# Patient Record
Sex: MALE | Race: WHITE | Employment: OTHER | URBAN - METROPOLITAN AREA
[De-identification: names, ages, dates, MRNs, and addresses within clinical notes are randomized per-mention and may not be internally consistent; named-entity substitution may affect disease eponyms.]

---

## 2021-05-07 LAB — PROSTATE SPECIFIC ANTIGEN: 0.85 NG/ML

## 2021-05-18 PROBLEM — R07.9 CHEST PAIN: Status: ACTIVE | Noted: 2021-05-18

## 2021-05-18 PROBLEM — R06.09 DOE (DYSPNEA ON EXERTION): Status: ACTIVE | Noted: 2021-05-18

## 2021-05-18 PROBLEM — I48.19 PERSISTENT ATRIAL FIBRILLATION (HCC): Status: ACTIVE | Noted: 2021-05-18

## 2021-05-18 PROBLEM — I48.0 PAROXYSMAL ATRIAL FIBRILLATION (HCC): Status: ACTIVE | Noted: 2021-05-18

## 2021-05-18 PROBLEM — E78.5 HYPERLIPIDEMIA: Status: ACTIVE | Noted: 2021-05-18

## 2021-06-22 PROBLEM — I42.9 CARDIOMYOPATHY (HCC): Status: ACTIVE | Noted: 2021-06-22

## 2021-09-20 LAB — PROSTATE SPECIFIC ANTIGEN: 0.68 NG/ML

## 2021-09-23 ENCOUNTER — HOSPITAL ENCOUNTER (OUTPATIENT)
Dept: LAB | Age: 66
Discharge: HOME | End: 2021-09-23
Payer: OTHER GOVERNMENT

## 2021-09-23 DIAGNOSIS — I48.19 PERSISTENT ATRIAL FIBRILLATION (HCC): ICD-10-CM

## 2021-09-23 DIAGNOSIS — I42.0 DILATED CARDIOMYOPATHY (HCC): ICD-10-CM

## 2021-09-23 LAB
ANION GAP SERPL CALC-SCNC: 4 MMOL/L (ref 7–16)
BASOPHILS # BLD: 0 K/UL (ref 0–0.2)
BASOPHILS NFR BLD: 1 % (ref 0–2)
BUN SERPL-MCNC: 18 MG/DL (ref 8–23)
CALCIUM SERPL-MCNC: 9.7 MG/DL (ref 8.3–10.4)
CHLORIDE SERPL-SCNC: 107 MMOL/L (ref 98–107)
CO2 SERPL-SCNC: 30 MMOL/L (ref 21–32)
CREAT SERPL-MCNC: 1.05 MG/DL (ref 0.8–1.5)
DIFFERENTIAL METHOD BLD: NORMAL
EOSINOPHIL # BLD: 0.3 K/UL (ref 0–0.8)
EOSINOPHIL NFR BLD: 5 % (ref 0.5–7.8)
ERYTHROCYTE [DISTWIDTH] IN BLOOD BY AUTOMATED COUNT: 12.4 % (ref 11.9–14.6)
GLUCOSE SERPL-MCNC: 80 MG/DL (ref 65–100)
HCT VFR BLD AUTO: 44.9 % (ref 41.1–50.3)
HGB BLD-MCNC: 14.5 G/DL (ref 13.6–17.2)
IMM GRANULOCYTES # BLD AUTO: 0 K/UL (ref 0–0.5)
IMM GRANULOCYTES NFR BLD AUTO: 1 % (ref 0–5)
LYMPHOCYTES # BLD: 1.9 K/UL (ref 0.5–4.6)
LYMPHOCYTES NFR BLD: 32 % (ref 13–44)
MAGNESIUM SERPL-MCNC: 2.4 MG/DL (ref 1.8–2.4)
MCH RBC QN AUTO: 31.5 PG (ref 26.1–32.9)
MCHC RBC AUTO-ENTMCNC: 32.3 G/DL (ref 31.4–35)
MCV RBC AUTO: 97.6 FL (ref 79.6–97.8)
MONOCYTES # BLD: 0.5 K/UL (ref 0.1–1.3)
MONOCYTES NFR BLD: 8 % (ref 4–12)
NEUTS SEG # BLD: 3.3 K/UL (ref 1.7–8.2)
NEUTS SEG NFR BLD: 54 % (ref 43–78)
NRBC # BLD: 0 K/UL (ref 0–0.2)
PLATELET # BLD AUTO: 352 K/UL (ref 150–450)
PMV BLD AUTO: 9.5 FL (ref 9.4–12.3)
POTASSIUM SERPL-SCNC: 5 MMOL/L (ref 3.5–5.1)
RBC # BLD AUTO: 4.6 M/UL (ref 4.23–5.6)
SODIUM SERPL-SCNC: 141 MMOL/L (ref 138–145)
WBC # BLD AUTO: 6.1 K/UL (ref 4.3–11.1)

## 2021-09-23 PROCEDURE — 36415 COLL VENOUS BLD VENIPUNCTURE: CPT

## 2021-09-23 PROCEDURE — 85025 COMPLETE CBC W/AUTO DIFF WBC: CPT

## 2021-09-23 PROCEDURE — 83735 ASSAY OF MAGNESIUM: CPT

## 2021-09-23 PROCEDURE — 80048 BASIC METABOLIC PNL TOTAL CA: CPT

## 2021-09-24 RX ORDER — ACETAMINOPHEN 500 MG
500 TABLET ORAL 2 TIMES DAILY
COMMUNITY

## 2021-09-24 NOTE — PROGRESS NOTES
Patient pre-assessment complete for KARTHIK-Atrial Fib Ablation with Dr Galina Bills scheduled for 21 at 7:30am, arrival time 6am. Patient verified using . Patient instructed to bring all home medications in labeled bottles on the day of procedure. NPO status reinforced. Patient instructed to HOLD eliquis starting . Instructed they can take all other medications excluding vitamins & supplements. Patient verbalizes understanding of all instructions & denies any questions at this time.

## 2021-09-26 ENCOUNTER — ANESTHESIA EVENT (OUTPATIENT)
Dept: CARDIAC CATH/INVASIVE PROCEDURES | Age: 66
End: 2021-09-26
Payer: OTHER GOVERNMENT

## 2021-09-27 ENCOUNTER — HOSPITAL ENCOUNTER (OUTPATIENT)
Age: 66
Setting detail: OUTPATIENT SURGERY
Discharge: HOME | End: 2021-09-27
Attending: INTERNAL MEDICINE | Admitting: INTERNAL MEDICINE
Payer: OTHER GOVERNMENT

## 2021-09-27 ENCOUNTER — HOSPITAL ENCOUNTER (OUTPATIENT)
Dept: CARDIAC CATH/INVASIVE PROCEDURES | Age: 66
Discharge: HOME | End: 2021-09-27
Attending: INTERNAL MEDICINE
Payer: OTHER GOVERNMENT

## 2021-09-27 ENCOUNTER — ANESTHESIA (OUTPATIENT)
Dept: CARDIAC CATH/INVASIVE PROCEDURES | Age: 66
End: 2021-09-27
Payer: OTHER GOVERNMENT

## 2021-09-27 VITALS
HEIGHT: 72 IN | OXYGEN SATURATION: 97 % | RESPIRATION RATE: 18 BRPM | WEIGHT: 176 LBS | BODY MASS INDEX: 23.84 KG/M2 | HEART RATE: 84 BPM | DIASTOLIC BLOOD PRESSURE: 72 MMHG | TEMPERATURE: 97.8 F | SYSTOLIC BLOOD PRESSURE: 108 MMHG

## 2021-09-27 VITALS — WEIGHT: 176 LBS | BODY MASS INDEX: 23.84 KG/M2 | HEIGHT: 72 IN

## 2021-09-27 DIAGNOSIS — I48.91 ATRIAL FIBRILLATION, UNSPECIFIED TYPE (HCC): ICD-10-CM

## 2021-09-27 DIAGNOSIS — I42.0 DILATED CARDIOMYOPATHY (HCC): ICD-10-CM

## 2021-09-27 DIAGNOSIS — I48.19 PERSISTENT ATRIAL FIBRILLATION (HCC): ICD-10-CM

## 2021-09-27 LAB
ACT BLD: 263 SECS (ref 70–128)
ACT BLD: 301 SECS (ref 70–128)
ANION GAP SERPL CALC-SCNC: 2 MMOL/L (ref 7–16)
ATRIAL RATE: 340 BPM
BUN SERPL-MCNC: 20 MG/DL (ref 8–23)
CALCIUM SERPL-MCNC: 8.9 MG/DL (ref 8.3–10.4)
CALCULATED R AXIS, ECG10: 58 DEGREES
CALCULATED T AXIS, ECG11: 54 DEGREES
CHLORIDE SERPL-SCNC: 108 MMOL/L (ref 98–107)
CO2 SERPL-SCNC: 30 MMOL/L (ref 21–32)
CREAT SERPL-MCNC: 1.05 MG/DL (ref 0.8–1.5)
DIAGNOSIS, 93000: NORMAL
ERYTHROCYTE [DISTWIDTH] IN BLOOD BY AUTOMATED COUNT: 12.4 % (ref 11.9–14.6)
GLUCOSE SERPL-MCNC: 92 MG/DL (ref 65–100)
HCT VFR BLD AUTO: 41.7 % (ref 41.1–50.3)
HGB BLD-MCNC: 13.5 G/DL (ref 13.6–17.2)
INR PPP: 1.1
MAGNESIUM SERPL-MCNC: 2.2 MG/DL (ref 1.8–2.4)
MCH RBC QN AUTO: 32.1 PG (ref 26.1–32.9)
MCHC RBC AUTO-ENTMCNC: 32.4 G/DL (ref 31.4–35)
MCV RBC AUTO: 99 FL (ref 79.6–97.8)
NRBC # BLD: 0 K/UL (ref 0–0.2)
PLATELET # BLD AUTO: 283 K/UL (ref 150–450)
PMV BLD AUTO: 9 FL (ref 9.4–12.3)
POTASSIUM SERPL-SCNC: 4.5 MMOL/L (ref 3.5–5.1)
PROTHROMBIN TIME: 14.4 SEC (ref 12.6–14.5)
Q-T INTERVAL, ECG07: 374 MS
QRS DURATION, ECG06: 112 MS
QTC CALCULATION (BEZET), ECG08: 462 MS
RBC # BLD AUTO: 4.21 M/UL (ref 4.23–5.6)
SODIUM SERPL-SCNC: 140 MMOL/L (ref 138–145)
VENTRICULAR RATE, ECG03: 92 BPM
WBC # BLD AUTO: 5.9 K/UL (ref 4.3–11.1)

## 2021-09-27 PROCEDURE — 80048 BASIC METABOLIC PNL TOTAL CA: CPT

## 2021-09-27 PROCEDURE — 93312 ECHO TRANSESOPHAGEAL: CPT

## 2021-09-27 PROCEDURE — C1894 INTRO/SHEATH, NON-LASER: HCPCS | Performed by: INTERNAL MEDICINE

## 2021-09-27 PROCEDURE — 93622 COMP EP EVAL L VENTR PAC&REC: CPT | Performed by: INTERNAL MEDICINE

## 2021-09-27 PROCEDURE — 93325 DOPPLER ECHO COLOR FLOW MAPG: CPT | Performed by: INTERNAL MEDICINE

## 2021-09-27 PROCEDURE — C1732 CATH, EP, DIAG/ABL, 3D/VECT: HCPCS | Performed by: INTERNAL MEDICINE

## 2021-09-27 PROCEDURE — C1893 INTRO/SHEATH, FIXED,NON-PEEL: HCPCS | Performed by: INTERNAL MEDICINE

## 2021-09-27 PROCEDURE — 77030027107 HC PTCH EXT REF CARTO3 J&J -F: Performed by: INTERNAL MEDICINE

## 2021-09-27 PROCEDURE — 93320 DOPPLER ECHO COMPLETE: CPT | Performed by: INTERNAL MEDICINE

## 2021-09-27 PROCEDURE — 74011000250 HC RX REV CODE- 250: Performed by: NURSE ANESTHETIST, CERTIFIED REGISTERED

## 2021-09-27 PROCEDURE — 93662 INTRACARDIAC ECG (ICE): CPT | Performed by: INTERNAL MEDICINE

## 2021-09-27 PROCEDURE — 93656 COMPRE EP EVAL ABLTJ ATR FIB: CPT | Performed by: INTERNAL MEDICINE

## 2021-09-27 PROCEDURE — 77030013687 HC GD NDL BARD -B: Performed by: INTERNAL MEDICINE

## 2021-09-27 PROCEDURE — 85027 COMPLETE CBC AUTOMATED: CPT

## 2021-09-27 PROCEDURE — 93312 ECHO TRANSESOPHAGEAL: CPT | Performed by: INTERNAL MEDICINE

## 2021-09-27 PROCEDURE — C1760 CLOSURE DEV, VASC: HCPCS | Performed by: INTERNAL MEDICINE

## 2021-09-27 PROCEDURE — 74011250636 HC RX REV CODE- 250/636: Performed by: NURSE ANESTHETIST, CERTIFIED REGISTERED

## 2021-09-27 PROCEDURE — 85610 PROTHROMBIN TIME: CPT

## 2021-09-27 PROCEDURE — 85347 COAGULATION TIME ACTIVATED: CPT

## 2021-09-27 PROCEDURE — 93623 PRGRMD STIMJ&PACG IV RX NFS: CPT | Performed by: INTERNAL MEDICINE

## 2021-09-27 PROCEDURE — C1759 CATH, INTRA ECHOCARDIOGRAPHY: HCPCS | Performed by: INTERNAL MEDICINE

## 2021-09-27 PROCEDURE — 74011250636 HC RX REV CODE- 250/636: Performed by: INTERNAL MEDICINE

## 2021-09-27 PROCEDURE — 93613 INTRACARDIAC EPHYS 3D MAPG: CPT | Performed by: INTERNAL MEDICINE

## 2021-09-27 PROCEDURE — C1730 CATH, EP, 19 OR FEW ELECT: HCPCS | Performed by: INTERNAL MEDICINE

## 2021-09-27 PROCEDURE — 93655 ICAR CATH ABLTJ DSCRT ARRHYT: CPT | Performed by: INTERNAL MEDICINE

## 2021-09-27 PROCEDURE — 92960 CARDIOVERSION ELECTRIC EXT: CPT | Performed by: INTERNAL MEDICINE

## 2021-09-27 PROCEDURE — C1733 CATH, EP, OTHR THAN COOL-TIP: HCPCS | Performed by: INTERNAL MEDICINE

## 2021-09-27 PROCEDURE — 77030035291 HC TBNG PMP SMARTABLATE J&J -B: Performed by: INTERNAL MEDICINE

## 2021-09-27 PROCEDURE — 77030039425 HC BLD LARYNG TRULITE DISP TELE -A: Performed by: ANESTHESIOLOGY

## 2021-09-27 PROCEDURE — 76060000035 HC ANESTHESIA 2 TO 2.5 HR: Performed by: INTERNAL MEDICINE

## 2021-09-27 PROCEDURE — 77030020506 HC NDL TRNSPTL NRG BAYL -F: Performed by: INTERNAL MEDICINE

## 2021-09-27 PROCEDURE — 77030019908 HC STETH ESOPH SIMS -A: Performed by: ANESTHESIOLOGY

## 2021-09-27 PROCEDURE — 77030037088 HC TUBE ENDOTRACH ORAL NSL COVD-A: Performed by: ANESTHESIOLOGY

## 2021-09-27 PROCEDURE — 77030013794 HC KT TRNSDUC BLD EDWD -B: Performed by: INTERNAL MEDICINE

## 2021-09-27 PROCEDURE — 83735 ASSAY OF MAGNESIUM: CPT

## 2021-09-27 PROCEDURE — 76210000006 HC OR PH I REC 0.5 TO 1 HR: Performed by: INTERNAL MEDICINE

## 2021-09-27 PROCEDURE — 93005 ELECTROCARDIOGRAM TRACING: CPT | Performed by: INTERNAL MEDICINE

## 2021-09-27 RX ORDER — PROTAMINE SULFATE 10 MG/ML
INJECTION, SOLUTION INTRAVENOUS AS NEEDED
Status: DISCONTINUED | OUTPATIENT
Start: 2021-09-27 | End: 2021-09-27 | Stop reason: HOSPADM

## 2021-09-27 RX ORDER — LIDOCAINE HYDROCHLORIDE 10 MG/ML
0.3 INJECTION INFILTRATION; PERINEURAL ONCE
Status: DISCONTINUED | OUTPATIENT
Start: 2021-09-27 | End: 2021-09-27 | Stop reason: HOSPADM

## 2021-09-27 RX ORDER — PANTOPRAZOLE SODIUM 40 MG/1
40 TABLET, DELAYED RELEASE ORAL EVERY 12 HOURS
Qty: 60 TABLET | Refills: 0 | Status: SHIPPED | OUTPATIENT
Start: 2021-09-27 | End: 2021-09-28 | Stop reason: SDUPTHER

## 2021-09-27 RX ORDER — MIDAZOLAM HYDROCHLORIDE 1 MG/ML
2 INJECTION, SOLUTION INTRAMUSCULAR; INTRAVENOUS
Status: DISCONTINUED | OUTPATIENT
Start: 2021-09-27 | End: 2021-09-28 | Stop reason: HOSPADM

## 2021-09-27 RX ORDER — HEPARIN SODIUM 200 [USP'U]/100ML
INJECTION, SOLUTION INTRAVENOUS AS NEEDED
Status: DISCONTINUED | OUTPATIENT
Start: 2021-09-27 | End: 2021-09-27 | Stop reason: HOSPADM

## 2021-09-27 RX ORDER — SUCRALFATE 1 G/1
1 TABLET ORAL 4 TIMES DAILY
Qty: 120 TABLET | Refills: 0 | Status: SHIPPED | OUTPATIENT
Start: 2021-09-27 | End: 2021-09-28 | Stop reason: SDUPTHER

## 2021-09-27 RX ORDER — SODIUM CHLORIDE, SODIUM LACTATE, POTASSIUM CHLORIDE, CALCIUM CHLORIDE 600; 310; 30; 20 MG/100ML; MG/100ML; MG/100ML; MG/100ML
100 INJECTION, SOLUTION INTRAVENOUS CONTINUOUS
Status: DISCONTINUED | OUTPATIENT
Start: 2021-09-27 | End: 2021-09-28 | Stop reason: HOSPADM

## 2021-09-27 RX ORDER — PROPOFOL 10 MG/ML
INJECTION, EMULSION INTRAVENOUS AS NEEDED
Status: DISCONTINUED | OUTPATIENT
Start: 2021-09-27 | End: 2021-09-27 | Stop reason: HOSPADM

## 2021-09-27 RX ORDER — COLCHICINE 0.6 MG/1
0.6 TABLET ORAL DAILY
Qty: 30 TABLET | Refills: 0 | Status: SHIPPED | OUTPATIENT
Start: 2021-09-27 | End: 2021-09-28 | Stop reason: SDUPTHER

## 2021-09-27 RX ORDER — SODIUM CHLORIDE, SODIUM LACTATE, POTASSIUM CHLORIDE, CALCIUM CHLORIDE 600; 310; 30; 20 MG/100ML; MG/100ML; MG/100ML; MG/100ML
100 INJECTION, SOLUTION INTRAVENOUS CONTINUOUS
Status: ACTIVE | OUTPATIENT
Start: 2021-09-27 | End: 2021-09-27

## 2021-09-27 RX ORDER — EPHEDRINE SULFATE/0.9% NACL/PF 50 MG/5 ML
SYRINGE (ML) INTRAVENOUS AS NEEDED
Status: DISCONTINUED | OUTPATIENT
Start: 2021-09-27 | End: 2021-09-27 | Stop reason: HOSPADM

## 2021-09-27 RX ORDER — DEXAMETHASONE SODIUM PHOSPHATE 4 MG/ML
INJECTION, SOLUTION INTRA-ARTICULAR; INTRALESIONAL; INTRAMUSCULAR; INTRAVENOUS; SOFT TISSUE AS NEEDED
Status: DISCONTINUED | OUTPATIENT
Start: 2021-09-27 | End: 2021-09-27 | Stop reason: HOSPADM

## 2021-09-27 RX ORDER — ADENOSINE 3 MG/ML
INJECTION, SOLUTION INTRAVENOUS AS NEEDED
Status: DISCONTINUED | OUTPATIENT
Start: 2021-09-27 | End: 2021-09-27 | Stop reason: HOSPADM

## 2021-09-27 RX ORDER — OXYCODONE HYDROCHLORIDE 5 MG/1
10 TABLET ORAL
Status: DISCONTINUED | OUTPATIENT
Start: 2021-09-27 | End: 2021-09-28 | Stop reason: HOSPADM

## 2021-09-27 RX ORDER — ONDANSETRON 2 MG/ML
INJECTION INTRAMUSCULAR; INTRAVENOUS AS NEEDED
Status: DISCONTINUED | OUTPATIENT
Start: 2021-09-27 | End: 2021-09-27 | Stop reason: HOSPADM

## 2021-09-27 RX ORDER — FENTANYL CITRATE 50 UG/ML
INJECTION, SOLUTION INTRAMUSCULAR; INTRAVENOUS AS NEEDED
Status: DISCONTINUED | OUTPATIENT
Start: 2021-09-27 | End: 2021-09-27 | Stop reason: HOSPADM

## 2021-09-27 RX ORDER — SODIUM CHLORIDE, SODIUM LACTATE, POTASSIUM CHLORIDE, CALCIUM CHLORIDE 600; 310; 30; 20 MG/100ML; MG/100ML; MG/100ML; MG/100ML
INJECTION, SOLUTION INTRAVENOUS
Status: DISCONTINUED | OUTPATIENT
Start: 2021-09-27 | End: 2021-09-27 | Stop reason: HOSPADM

## 2021-09-27 RX ORDER — HEPARIN SODIUM 1000 [USP'U]/ML
INJECTION, SOLUTION INTRAVENOUS; SUBCUTANEOUS AS NEEDED
Status: DISCONTINUED | OUTPATIENT
Start: 2021-09-27 | End: 2021-09-27 | Stop reason: HOSPADM

## 2021-09-27 RX ORDER — HYDROMORPHONE HYDROCHLORIDE 2 MG/ML
0.5 INJECTION, SOLUTION INTRAMUSCULAR; INTRAVENOUS; SUBCUTANEOUS
Status: DISCONTINUED | OUTPATIENT
Start: 2021-09-27 | End: 2021-09-28 | Stop reason: HOSPADM

## 2021-09-27 RX ORDER — HEPARIN SODIUM 5000 [USP'U]/100ML
INJECTION, SOLUTION INTRAVENOUS
Status: DISCONTINUED | OUTPATIENT
Start: 2021-09-27 | End: 2021-09-27 | Stop reason: HOSPADM

## 2021-09-27 RX ORDER — ROCURONIUM BROMIDE 10 MG/ML
INJECTION, SOLUTION INTRAVENOUS AS NEEDED
Status: DISCONTINUED | OUTPATIENT
Start: 2021-09-27 | End: 2021-09-27 | Stop reason: HOSPADM

## 2021-09-27 RX ORDER — LIDOCAINE HYDROCHLORIDE 20 MG/ML
INJECTION, SOLUTION EPIDURAL; INFILTRATION; INTRACAUDAL; PERINEURAL AS NEEDED
Status: DISCONTINUED | OUTPATIENT
Start: 2021-09-27 | End: 2021-09-27 | Stop reason: HOSPADM

## 2021-09-27 RX ADMIN — PHENYLEPHRINE HYDROCHLORIDE 120 MCG: 10 INJECTION INTRAVENOUS at 08:40

## 2021-09-27 RX ADMIN — PROPOFOL 100 MG: 10 INJECTION, EMULSION INTRAVENOUS at 07:17

## 2021-09-27 RX ADMIN — FENTANYL CITRATE 100 MCG: 50 INJECTION INTRAMUSCULAR; INTRAVENOUS at 07:17

## 2021-09-27 RX ADMIN — DEXAMETHASONE SODIUM PHOSPHATE 10 MG: 4 INJECTION, SOLUTION INTRAMUSCULAR; INTRAVENOUS at 07:30

## 2021-09-27 RX ADMIN — PROTAMINE SULFATE 100 MG: 10 INJECTION, SOLUTION INTRAVENOUS at 08:56

## 2021-09-27 RX ADMIN — Medication 7.5 MG: at 07:28

## 2021-09-27 RX ADMIN — ROCURONIUM BROMIDE 20 MG: 10 INJECTION, SOLUTION INTRAVENOUS at 07:55

## 2021-09-27 RX ADMIN — SUGAMMADEX 200 MG: 100 INJECTION, SOLUTION INTRAVENOUS at 09:02

## 2021-09-27 RX ADMIN — PROPOFOL 20 MG: 10 INJECTION, EMULSION INTRAVENOUS at 08:38

## 2021-09-27 RX ADMIN — ROCURONIUM BROMIDE 5 MG: 10 INJECTION, SOLUTION INTRAVENOUS at 08:02

## 2021-09-27 RX ADMIN — HEPARIN SODIUM 7000 UNITS: 1000 INJECTION, SOLUTION INTRAVENOUS; SUBCUTANEOUS at 07:49

## 2021-09-27 RX ADMIN — HEPARIN SODIUM 3000 UNITS: 1000 INJECTION, SOLUTION INTRAVENOUS; SUBCUTANEOUS at 08:10

## 2021-09-27 RX ADMIN — HEPARIN SODIUM 7000 UNITS: 1000 INJECTION, SOLUTION INTRAVENOUS; SUBCUTANEOUS at 07:41

## 2021-09-27 RX ADMIN — PHENYLEPHRINE HYDROCHLORIDE 120 MCG: 10 INJECTION INTRAVENOUS at 08:56

## 2021-09-27 RX ADMIN — PHENYLEPHRINE HYDROCHLORIDE 120 MCG: 10 INJECTION INTRAVENOUS at 07:40

## 2021-09-27 RX ADMIN — ONDANSETRON 4 MG: 2 INJECTION INTRAMUSCULAR; INTRAVENOUS at 07:45

## 2021-09-27 RX ADMIN — Medication 7.5 MG: at 08:54

## 2021-09-27 RX ADMIN — SODIUM CHLORIDE 40 MCG/MIN: 900 INJECTION, SOLUTION INTRAVENOUS at 07:40

## 2021-09-27 RX ADMIN — PHENYLEPHRINE HYDROCHLORIDE 120 MCG: 10 INJECTION INTRAVENOUS at 07:18

## 2021-09-27 RX ADMIN — PROPOFOL 20 MG: 10 INJECTION, EMULSION INTRAVENOUS at 07:27

## 2021-09-27 RX ADMIN — PHENYLEPHRINE HYDROCHLORIDE 120 MCG: 10 INJECTION INTRAVENOUS at 07:35

## 2021-09-27 RX ADMIN — PHENYLEPHRINE HYDROCHLORIDE 120 MCG: 10 INJECTION INTRAVENOUS at 07:27

## 2021-09-27 RX ADMIN — ROCURONIUM BROMIDE 40 MG: 10 INJECTION, SOLUTION INTRAVENOUS at 07:17

## 2021-09-27 RX ADMIN — SODIUM CHLORIDE, SODIUM LACTATE, POTASSIUM CHLORIDE, AND CALCIUM CHLORIDE: 600; 310; 30; 20 INJECTION, SOLUTION INTRAVENOUS at 07:10

## 2021-09-27 RX ADMIN — ROCURONIUM BROMIDE 5 MG: 10 INJECTION, SOLUTION INTRAVENOUS at 08:12

## 2021-09-27 RX ADMIN — ROCURONIUM BROMIDE 5 MG: 10 INJECTION, SOLUTION INTRAVENOUS at 08:21

## 2021-09-27 RX ADMIN — LIDOCAINE HYDROCHLORIDE 100 MG: 20 INJECTION, SOLUTION EPIDURAL; INFILTRATION; INTRACAUDAL; PERINEURAL at 07:17

## 2021-09-27 RX ADMIN — ROCURONIUM BROMIDE 5 MG: 10 INJECTION, SOLUTION INTRAVENOUS at 08:43

## 2021-09-27 RX ADMIN — PROPOFOL 50 MG: 10 INJECTION, EMULSION INTRAVENOUS at 07:20

## 2021-09-27 RX ADMIN — HEPARIN SODIUM 40 UNITS/KG/HR: 5000 INJECTION, SOLUTION INTRAVENOUS at 07:49

## 2021-09-27 NOTE — Clinical Note
Left femoral vein. Accessed successfully. Femoral access needle used. Using ultrasound guidance.  Number of attempts =  1.

## 2021-09-27 NOTE — Clinical Note
TRANSFER - IN REPORT:     Verbal report received from: pre op rn. Report consisted of patient's Situation, Background, Assessment and   Recommendations(SBAR). Opportunity for questions and clarification was provided. Assessment completed upon patient's arrival to unit and care assumed. Patient transported with a Registered Nurse.

## 2021-09-27 NOTE — PROGRESS NOTES
Patient received to [address] [place] [address] room # 9  Ambulatory from [facility]. Patient scheduled for Afib ablation today with Dr Amanda Croft. Procedure reviewed & questions answered, voiced good understanding consent obtained & placed on chart. All medications and medical history reviewed. Will prep patient per orders. Patient & family updated on plan of care.       The patient has a fraility score of 3-MANAGING WELL, based on ability to perform ADLS by self

## 2021-09-27 NOTE — PROGRESS NOTES
Report received from [facility] PACU  RN. Procedural finding communicated. Intra procedural medication administration reviewed. Progression of care discussed. Patient received into CPRU room 8, Post Afib ablation with Dr Jeannette Stockton - NSR rate 79    Access site without bleeding or swelling. Rigt & left groin with dressing dry & intact - Vascade closure device    Patient instructed to limit movement of Bilateral lower  extremity. Routine post procedural vital signs & site assessment initiated.

## 2021-09-27 NOTE — Clinical Note
Sheath #all: Dressed using 4 X 4, gauze and transparent dressing. Site: clean, dry, & intact, no bleeding and no hematoma.

## 2021-09-27 NOTE — ANESTHESIA POSTPROCEDURE EVALUATION
Procedure(s):  ABLATION A-FIB  W COMPLETE EP STUDY. general    Anesthesia Post Evaluation      Multimodal analgesia: multimodal analgesia used between 6 hours prior to anesthesia start to PACU discharge  Patient location during evaluation: PACU  Level of consciousness: awake  Pain management: adequate  Airway patency: patent  Anesthetic complications: no  Cardiovascular status: acceptable  Respiratory status: acceptable  Hydration status: acceptable  Post anesthesia nausea and vomiting:  none      INITIAL Post-op Vital signs:   Vitals Value Taken Time   /66 09/27/21 0945   Temp 36.6 °C (97.8 °F) 09/27/21 0945   Pulse 78 09/27/21 0949   Resp 18 09/27/21 0945   SpO2 92 % 09/27/21 0949   Vitals shown include unvalidated device data.

## 2021-09-27 NOTE — Clinical Note
Right femoral vein. Accessed successfully. Femoral access needle used. Using ultrasound guidance.  Number of attempts =  1.

## 2021-09-27 NOTE — DISCHARGE INSTRUCTIONS
Patient Education        Electrophysiology Study and Catheter Ablation: What to Expect at [address] [place] [address] Drive had an electrophysiology study for a problem with your heartbeat. You may also have had a catheter ablation to try to correct the problem. You may have swelling, bruising, or a small lump around the site where the catheters went into your body. These should go away in 3 to 4 weeks. Do not exercise hard or lift anything heavy for a week. You may be able to go back to work and to your normal routine in 1 or 2 days. This care sheet gives you a general idea about how long it will take for you to recover. But each person recovers at a different pace. Follow the steps below to get better as quickly as possible. How can you care for yourself at home? Activity    · For 1 week, do not lift anything that would make you strain. This may include heavy grocery bags and milk containers, a heavy briefcase or backpack, cat litter or dog food bags, a vacuum , or a child.     · For 1 week, do not exercise hard or do any activity that could strain your blood vessels or the site where the catheters went into your body.     · Ask your doctor when it is okay to have sex. Diet    · You can eat your normal diet. If your stomach is upset, try bland, low-fat foods like plain rice, broiled chicken, toast, and yogurt.     · Drink plenty of fluids (unless your doctor tells you not to). Medicines    · Your doctor will tell you if and when you can restart your medicines. He or she will also give you instructions about taking any new medicines.     · If you take aspirin or some other blood thinner, ask your doctor if and when to start taking it again. Make sure that you understand exactly what your doctor wants you to do.     · Ask your doctor if you can take acetaminophen (Tylenol) for pain.  Do not take aspirin for 3 days, unless your doctor says it is okay.     · Check with your doctor before you take aspirin or anti-inflammatory medicines to reduce pain and swelling. These include ibuprofen (Advil, Motrin) and naproxen (Aleve).   · Make sure you know which heart medicines to continue and which ones to stop. Ask your doctor if you aren't sure. Catheter site care    · You can remove your bandages the day after the procedure.     · You may shower 24 to 48 hours after the procedure, if your doctor okays it. Pat the incision dry.     · Do not soak the catheter site until it is healed. Don't take a bath for 1 week, or until your doctor tells you it is okay.     · Watch for bleeding from the site. A small amount of blood (up to the size of a quarter) on the bandage can be normal.     · If you are bleeding, lie down and press on the area for 15 minutes to try to make it stop. If the bleeding does not stop, call your doctor or seek immediate medical care. Follow-up care is a key part of your treatment and safety. Be sure to make and go to all appointments, and call your doctor if you are having problems. It's also a good idea to know your test results and keep a list of the medicines you take. When should you call for help? Call 911  anytime you think you may need emergency care. For example, call if:    · You passed out (lost consciousness).     · You have symptoms of a heart attack. These may include:  ? Chest pain or pressure, or a strange feeling in the chest.  ? Sweating. ? Shortness of breath. ? Nausea or vomiting. ? Pain, pressure, or a strange feeling in the back, neck, jaw, or upper belly, or in one or both shoulders or arms. ? Lightheadedness or sudden weakness. ? A fast or irregular heartbeat. After you call 911, the  may tell you to chew 1 adult-strength or 2 to 4 low-dose aspirin. Wait for an ambulance. Do not try to drive yourself.     · You have symptoms of a stroke.  These may include:  ? Sudden numbness, tingling, weakness, or loss of movement in your face, arm, or leg, especially on only one side of your body. ? Sudden vision changes. ? Sudden trouble speaking. ? Sudden confusion or trouble understanding simple statements. ? Sudden problems with walking or balance. ? A sudden, severe headache that is different from past headaches. Call your doctor now or seek immediate medical care if:    · You are bleeding from the area where the catheter was put in your blood vessel.     · You have a fast-growing, painful lump at the catheter site.     · You have signs of infection, such as:  ? Increased pain, swelling, warmth, or redness. ? Red streaks leading from the catheter site. ? Pus draining from the catheter site. ? A fever.     · Your leg, arm, or hand is painful, looks blue, or feels cold, numb, or tingly. Watch closely for any changes in your health, and be sure to contact your doctor if you have any problems. Where can you learn more? Go to http://www.gray.com/  Enter H580 in the search box to learn more about \"Electrophysiology Study and Catheter Ablation: What to Expect at Home. \"  Current as of: April 29, 2021               Content Version: 13.0  © 0320-3238 [employer]. Care instructions adapted under license by [employer]. (which disclaims liability or warranty for this information). If you have questions about a medical condition or this instruction, always ask your healthcare professional. [place] [address] any warranty or liability for your use of this information. Patient Education        Atrial Fibrillation: Care Instructions  Your Care Instructions     Atrial fibrillation is an irregular and often fast heartbeat. Treating this condition is important for several reasons. It can cause blood clots, which can travel from your heart to your brain and cause a stroke. If you have a fast heartbeat, you may feel lightheaded, dizzy, and weak. An irregular heartbeat can also increase your risk for heart failure.   Atrial fibrillation is often the result of another heart condition, such as high blood pressure or coronary artery disease. Making changes to improve your heart condition will help you stay healthy and active. Follow-up care is a key part of your treatment and safety. Be sure to make and go to all appointments, and call your doctor if you are having problems. It's also a good idea to know your test results and keep a list of the medicines you take. How can you care for yourself at home? Medicines    · Take your medicines exactly as prescribed. Call your doctor if you think you are having a problem with your medicine. You will get more details on the specific medicines your doctor prescribes.     · If your doctor has given you a blood thinner to prevent a stroke, be sure you get instructions about how to take your medicine safely. Blood thinners can cause serious bleeding problems.     · Do not take any vitamins, over-the-counter drugs, or herbal products without talking to your doctor first.   Lifestyle changes    · Do not smoke. Smoking can increase your chance of a stroke and heart attack. If you need help quitting, talk to your doctor about stop-smoking programs and medicines. These can increase your chances of quitting for good.     · Eat a heart-healthy diet.     · Stay at a healthy weight. Lose weight if you need to.     · Limit alcohol to 2 drinks a day for men and 1 drink a day for women. Too much alcohol can cause health problems.     · Avoid colds and flu. Get a pneumococcal vaccine shot. If you have had one before, ask your doctor whether you need another dose. Get a flu shot every year. If you must be around people with colds or flu, wash your hands often. Activity    · If your doctor recommends it, get more exercise. Walking is a good choice. Bit by bit, increase the amount you walk every day. Try for at least 30 minutes on most days of the week. You also may want to swim, bike, or do other activities. Your doctor may suggest that you join a cardiac rehabilitation program so that you can have help increasing your physical activity safely.     · Start light exercise if your doctor says it is okay. Even a small amount will help you get stronger, have more energy, and manage stress. Walking is an easy way to get exercise. Start out by walking a little more than you did in the hospital. Gradually increase the amount you walk.     · When you exercise, watch for signs that your heart is working too hard. You are pushing too hard if you cannot talk while you are exercising. If you become short of breath or dizzy or have chest pain, sit down and rest immediately.     · Check your pulse regularly. Place two fingers on the artery at the palm side of your wrist, in line with your thumb. If your heartbeat seems uneven or fast, talk to your doctor. When should you call for help? Call 911 anytime you think you may need emergency care. For example, call if:    · You have symptoms of a heart attack. These may include:  ? Chest pain or pressure, or a strange feeling in the chest.  ? Sweating. ? Shortness of breath. ? Nausea or vomiting. ? Pain, pressure, or a strange feeling in the back, neck, jaw, or upper belly or in one or both shoulders or arms. ? Lightheadedness or sudden weakness. ? A fast or irregular heartbeat. After you call 911, the  may tell you to chew 1 adult-strength or 2 to 4 low-dose aspirin. Wait for an ambulance. Do not try to drive yourself.     · You have symptoms of a stroke. These may include:  ? Sudden numbness, tingling, weakness, or loss of movement in your face, arm, or leg, especially on only one side of your body. ? Sudden vision changes. ? Sudden trouble speaking. ? Sudden confusion or trouble understanding simple statements. ? Sudden problems with walking or balance. ? A sudden, severe headache that is different from past headaches.     · You passed out (lost consciousness). Call your doctor now or seek immediate medical care if:    · You have new or increased shortness of breath.     · You feel dizzy or lightheaded, or you feel like you may faint.     · Your heart rate becomes irregular.     · You can feel your heart flutter in your chest or skip heartbeats. Tell your doctor if these symptoms are new or worse. Watch closely for changes in your health, and be sure to contact your doctor if you have any problems. Where can you learn more? Go to http://www.gray.com/  Enter U020 in the search box to learn more about \"Atrial Fibrillation: Care Instructions. \"  Current as of: April 29, 2021               Content Version: 13.0  © 4395-8050 [employer]. Care instructions adapted under license by [employer] (which disclaims liability or warranty for this information). If you have questions about a medical condition or this instruction, always ask your healthcare professional. [place] [address] any warranty or liability for your use of this information. Sedation for a Medical Procedure: Care Instructions     You were given a sedative medication during your visit. While many of the effects will have worn   off before you leave; you may continue to feel some effects for several hours. Common side effects from sedation include:  · Feeling sleepy. (Your doctors and nurses will make sure you are not too sleepy to go home.)  · Nausea and vomiting. This usually does not last long. · Feeling tired. How can you care for yourself at home? Activity    · Don't do anything for 24 hours that requires attention to detail. It takes time for the medicine effects to completely wear off. · Do not make important legal decisions for 24 hours. · Do not sign any legal documents for 24 hours.      · Do not drink alcohol today     · For your safety, you should not drive or operate heavy machinery for the remainder of the day     · Rest when you feel tired. Getting enough sleep will help you recover. Diet    · You can eat your normal diet, unless your doctor gives you other instructions. If your stomach is upset, try clear liquids and bland, low-fat foods like plain toast or rice. · Drink plenty of fluids (unless your doctor tells you not to). · Don't drink alcohol for 24 hours. Medicines    · Be safe with medicines. Read and follow all instructions on the label. · If the doctor gave you a prescription medicine for pain, take it as prescribed. · If you are not taking a prescription pain medicine, ask your doctor if you can take an over-the-counter medicine. · If you think your pain medicine is making you sick to your stomach:  · Take your medicine after meals (unless your doctor has told you not to). · Ask your doctor for a different pain medicine. I have read the above instructions and have had the opportunity to ask questions.       Patient: ________________________   Date: _____________    Witness: _______________________   Date: _____________

## 2021-09-27 NOTE — PERIOP NOTES
TRANSFER - OUT REPORT:    Verbal [facility]  being transferred to Cath lab post op for routine progression of care       Report consisted of patients Situation, Background, Assessment and   Recommendations(SBAR). Information from the following report(s) SBAR, Kardex, OR Summary, Procedure Summary, Intake/Output, MAR and Cardiac Rhythm sinus was reviewed with the receiving nurse. Lines:   Peripheral IV 09/27/21 Right Antecubital (Active)   Site Assessment Clean, dry, & intact 09/27/21 0921   Phlebitis Assessment 0 09/27/21 0921   Infiltration Assessment 0 09/27/21 0921   Dressing Status Clean, dry, & intact 09/27/21 0921   Dressing Type Transparent;Tape 09/27/21 0921   Hub Color/Line Status Patent 09/27/21 0921       Peripheral IV 09/27/21 Anterior; Left Forearm (Active)   Site Assessment Clean, dry, & intact 09/27/21 0921   Phlebitis Assessment 0 09/27/21 0921   Infiltration Assessment 0 09/27/21 0921   Dressing Status Clean, dry, & intact 09/27/21 0921   Dressing Type Transparent;Tape 09/27/21 0921   Hub Color/Line Status Patent 09/27/21 5009        Opportunity for questions and clarification was provided. Patient transported with:   Registered Nurse    VTE prophylaxis orders have not been written for Natividad Singh. Patient and family given floor number and nurses name. Family updated re: pt status after security code verified.

## 2021-09-27 NOTE — ANESTHESIA PREPROCEDURE EVALUATION
Relevant Problems   No relevant active problems       Anesthetic History          Comments: Very sore throat with ankle surgery once, denies difficult intubation with surgeries     Review of Systems / Medical History  Patient summary reviewed and pertinent labs reviewed    Pulmonary                   Neuro/Psych              Cardiovascular          CHF: dyspnea on exertion  Dysrhythmias : atrial fibrillation      Exercise tolerance: <4 METS  Comments: EF 30-35%   GI/Hepatic/Renal  Within defined limits              Endo/Other  Within defined limits           Other Findings              Physical Exam    Airway  Mallampati: II  TM Distance: < 4 cm  Neck ROM: normal range of motion, short neck   Mouth opening: Normal     Cardiovascular    Rhythm: irregular  Rate: abnormal         Dental    Dentition: Caps/crowns     Pulmonary  Breath sounds clear to auscultation               Abdominal         Other Findings            Anesthetic Plan    ASA: 3  Anesthesia type: general          Induction: Intravenous  Anesthetic plan and risks discussed with: Patient

## 2021-09-27 NOTE — PROGRESS NOTES
RIGHT & Left groin without bleeding or hematoma. HOB elevated to 30degrees.  Denies any needs at this time

## 2021-09-27 NOTE — Clinical Note
Transseptal Cath Performed under hemodynamic and ICE and Fluoro, utilizing a standard needle, Rhodell 71cm via a guiding sheath. Needle inserted.

## 2021-09-27 NOTE — PROCEDURES
Pre-Electrophysiology Diagnosis  1. Persistent Atrial fibrillation     Procedure Performed  1. EPS afib ablation/pulmonary vein isolation  2. Left atrial pacing recording from the coronary sinus. 3. 3-D Electroanatomical mapping  4. Intracardiac echo  5. Ablation of second arrhythmia  6. LV pacing and recording  7. Transesophageal echo  8. Drug infusion  9. Cardioversion    Anesthesia: General     Estimated Blood Loss: Less than 10 mL     Specimens: * No specimens in log *    Procedure in Detail:  The patient was brought to the electrophysiology lab in the fasting state. The patient was intubated by anesthesiology and an esophageal temperature probe inserted and advanced to a location directly posterior to the LA at the level of the pulmonary veins. The esophageal temperature probe was repositioned throughout the case to a location as close the the ablation catheter as possible. If the esophageal temperature increased 0.5 degrees Celsius ablation was stopped until the temperature returned to baseline. A tranesophageal echocardiogram was performed directly prior to the procedure and was negative for a JENNA thrombus (see full report in chart). A Ref-Star CARTO patch was placed, the patient was then prepped and draped in sterile fashion. Venous access was obtained under ultrasound guidance x4 using modified Seldinger technique, with placement of one 8Fr short sidearm sheath and two 8.5 Fr SL-O 63cm long braided sheaths in the right femoral vein and placement of a 10Fr sheath into the left femoral vein. The patient presented to the EP lab in atrial fibrillation and underwent DCCV with pads across the anterior and posterior chest.  The patient quickly reverted back into afib. An intra-cardiac echo probe was prepped, and then inserted into an 10 Fr short sheath and used to localize the fossa ovalis and create LA and pulmonary vein anatomy utilizing [facility].   A GMEXter PentCritiTechy catheter was then inserted into an 8.5 SLO Fr sheath and used to create an electroanatomical map of the right atrium, including attempts at His localization and the os of the CS. Then a Smart Touch porous irrigated BW 3.5mm ablation catheter was used to map out the body of the CS. A decapolar Biosense Ross CS catheter was inserted via an 8Fr sheath and positioned in the mid coronary sinus. Next, a trans-septal needle was inserted into the SLO and was used to perform a trans-septal puncture with assistance from ICE (intra-cardiac echo) as well as the [address] [place] [address],[address] map and the right atrial impedence map. The SLO sheath was advanced into the LA. Total weight based heparin bolus was administered just after transeptal access, and systemic blood pressure monitored invasively. The patient was then placed on our heparin weight based nomogram for targeted ACT of 300-350 during the ablation procedure. A second trans-septal access was obtained with the ablation catheter. The Pentaray catheter was then inserted into the LA via the SL-O sheath and was used to create a detail electroanatomical voltage map of the left atrium including the pulmonary veins to identify the pulmonary vein sleeves and further guide additional ablation. The Pentaray was used to map pulmonary vein potentials and target ablation. An 8 Fr, 3.5mm tip saline irrigated bidirectional D/F curve Thermo-cool SmartTouch catheter was used for RF ablation and ablation was performed at 35-50W with reduction in power as needed if ablation was performed in close proximity to the esophagus. Antral pulmonary vein isolation was then performed for all 4 pulmonary veins. Repeat DCCV was successful. Entrance and exit block was demonstrated by left atrial pacing and within the pulmonary veins. Lack of any conducted atrial EGMs into the pulmonary veins was documented.   Prior to ablation of the right antral pulmonary veins, the ablation catheter was used to pace at high output to try to localize the right phrenic nerve and map its location prior to ablation. Adenosine was injected (6-12 mg) to demonstrate the lack of early reconnection with the Pentaray in the PVs. There was early reconnection with adenosine within the right antrum. Additional ablation was performed followed by additional adenosine revealing no further reconnection. The ablation catheter was inserted into the LV, documented LV electrograms and was used to pace the LV for the EP study and for rescue pacing during adenosine infusion. Post PVI, the Pentaray was used to perform a second LA voltage map post ablation to demonstrate pulmonary vein isolation and post ablation voltage as pictured below. Post Ablation LA Voltage Map      Cavotricuspid Isthmus ablation (right atrial flutter)  Linear ablation across the cavo-tricuspid isthmus was performed starting with 1:2 A:V EGMs along the isthmus at 6pm [place]. The local electrogram activation sequence, differential pacing maneuvers and electrogram timing was used to demonstrate bidirectional block along the cavotricuspid isthmus. Post-Ablation trans-isthmus time: 154 msec    Next, baseline recordings and a diagnostic EP study was performed. The coronary sinus multipolar catheter was used to pace the left atrium during the EP study. The LA CS electrograms were documented and interpreted during the procedure. A comprehensive EP study was performed with 1:1 AV decremental pacing, atrial extrastimuli and ventricular pacing to assess retrograde conduction. The patient did not have sustained slow pathway conduction or evidence of an accessory pathway. Ventricular pacing revealed no retrograde conduction. At the completion of the ablation and EPS, all catheters were removed, 100mg Protamine was administered and sheaths were pulled after placing a figure of 8 stitch. The patient tolerated the procedure well with no acute complications recognized.  The patient left the EP lab in stable condition, in normal sinus rhythm. Just prior to pulling shealths, the ICE catheter was used to obtain ultrasound images and revealed no evidence of pericardial effusion. Baseline Intervals    QRS duration: 96 msec  TX interval: 163 msec  RR interval: 1451 msec  HV interval: 44 msec    EP Study    AV Wenchebach: 320 msec  VA Wenchebach: No VA conduction     Complications: None    Summary:   1. Successful pulmonary vein isolation x4.  2. Creation of a line of bidirectional block at the cavotricuspid isthmus. 3.         Comprehensive EP study. 4. Pt tolerated the procedure well. Carol Alba MD, MS  Clinical Cardiac Electrophysiology  9/27/2021  9:10 AM

## 2021-09-28 NOTE — PROGRESS NOTES
Assisted OOB & ambulated to BR & on unit. Right & left groin without bleeding or hematoma. Assisted back to chair in room.  Tolerated well

## 2021-09-28 NOTE — PROGRESS NOTES
Remains up in chair at bedside, denies any need at this time.  Right & left groin without bleeding or hematoma

## 2021-09-28 NOTE — PROGRESS NOTES
Assisted OOB & ambulated to BR & on unit. Tolerated activity without difficulty. Right & left  without bleeding or hematoma.  Back to room & back to bed

## 2021-09-28 NOTE — PROGRESS NOTES
Discharge instructions given per orders, voiced good understanding of Right & left groin care, medications & follow up care.  Denies any questions

## 2021-10-20 ENCOUNTER — TRANSCRIBE ORDER (OUTPATIENT)
Dept: SCHEDULING | Age: 66
End: 2021-10-20

## 2021-10-20 DIAGNOSIS — M54.2 CERVICALGIA: Primary | ICD-10-CM

## 2021-10-20 DIAGNOSIS — M54.40 LOW BACK PAIN WITH SCIATICA: ICD-10-CM

## 2021-10-30 ENCOUNTER — HOSPITAL ENCOUNTER (OUTPATIENT)
Dept: MRI IMAGING | Age: 66
Discharge: HOME | End: 2021-10-30
Attending: FAMILY MEDICINE
Payer: OTHER GOVERNMENT

## 2021-10-30 DIAGNOSIS — M54.2 CERVICALGIA: ICD-10-CM

## 2021-10-30 DIAGNOSIS — M54.40 LOW BACK PAIN WITH SCIATICA: ICD-10-CM

## 2021-10-30 PROCEDURE — 72148 MRI LUMBAR SPINE W/O DYE: CPT

## 2021-10-30 PROCEDURE — 72141 MRI NECK SPINE W/O DYE: CPT

## 2022-03-19 PROBLEM — R06.09 DOE (DYSPNEA ON EXERTION): Status: ACTIVE | Noted: 2021-05-18

## 2022-03-19 PROBLEM — E78.5 HYPERLIPIDEMIA: Status: ACTIVE | Noted: 2021-05-18

## 2022-03-19 PROBLEM — R07.9 CHEST PAIN: Status: ACTIVE | Noted: 2021-05-18

## 2022-03-20 PROBLEM — I42.9 CARDIOMYOPATHY (HCC): Status: ACTIVE | Noted: 2021-06-22

## 2022-03-20 PROBLEM — I48.19 PERSISTENT ATRIAL FIBRILLATION (HCC): Status: ACTIVE | Noted: 2021-05-18

## 2022-03-20 PROBLEM — I48.0 PAROXYSMAL ATRIAL FIBRILLATION (HCC): Status: ACTIVE | Noted: 2021-05-18

## 2022-06-15 ENCOUNTER — OFFICE VISIT (OUTPATIENT)
Dept: CARDIOLOGY CLINIC | Age: 67
End: 2022-06-15
Payer: OTHER GOVERNMENT

## 2022-06-15 VITALS
HEIGHT: 72 IN | SYSTOLIC BLOOD PRESSURE: 132 MMHG | HEART RATE: 64 BPM | DIASTOLIC BLOOD PRESSURE: 78 MMHG | BODY MASS INDEX: 22.21 KG/M2 | WEIGHT: 164 LBS

## 2022-06-15 DIAGNOSIS — I48.19 PERSISTENT ATRIAL FIBRILLATION (HCC): Primary | ICD-10-CM

## 2022-06-15 DIAGNOSIS — I42.0 DILATED CARDIOMYOPATHY (HCC): ICD-10-CM

## 2022-06-15 PROCEDURE — 1123F ACP DISCUSS/DSCN MKR DOCD: CPT | Performed by: INTERNAL MEDICINE

## 2022-06-15 PROCEDURE — 93000 ELECTROCARDIOGRAM COMPLETE: CPT | Performed by: INTERNAL MEDICINE

## 2022-06-15 PROCEDURE — 99213 OFFICE O/P EST LOW 20 MIN: CPT | Performed by: INTERNAL MEDICINE

## 2022-06-15 RX ORDER — MAGNESIUM OXIDE 400 MG/1
400 TABLET ORAL DAILY
COMMUNITY

## 2022-06-15 RX ORDER — MELOXICAM 15 MG/1
15 TABLET ORAL DAILY
COMMUNITY

## 2022-06-15 RX ORDER — PREGABALIN 25 MG/1
25 CAPSULE ORAL 2 TIMES DAILY
COMMUNITY

## 2022-06-15 NOTE — PROGRESS NOTES
[address] [address] GRACE Wetzel  [address]  PHONE: [phone]  1955    SUBJECTIVE:   Linus Dunbar is a 74 y.o. male seen for a follow up visit regarding the following:     No chief complaint on file. HPI:    Linus Dunbar is a very pleasant 74 y.o. male with a past medical and cardiac history significant for HLD, persistent atrial fibrillation, NICM with EF 30% and normal MPI and presents  today for consultation for atrial fibrillation. He has been having problems with shortness of breath mainly with moderate exertion, has not been as active over the last year. No new chest pain, palpitations, presyncope or syncope. PT presents back s/p afib ablation and has been doing great. Feels better, more energy, less fatigue, no chest pain or SOB. EF normalized, no AF, no new complaints. Cardiac PMH: (Old records have been reviewed and summarized below)   TTE (6/4/21): EF 30-35%, severe LAE   MPI (6/14/21): low risk MPI      Reviewed office note Dr. Boris Ulrich 6/22/21    Past Medical History, Past Surgical History, Family history, Social History, and Medications were all reviewed with the patient today and updated as necessary. Current Outpatient Medications   Medication Sig Dispense Refill    meloxicam (MOBIC) 15 MG tablet Take 15 mg by mouth daily      magnesium oxide (MAG-OX) 400 MG tablet Take 400 mg by mouth daily      pregabalin (LYRICA) 25 MG capsule Take 25 mg by mouth 2 times daily.       Multiple Vitamin (MULTIVITAMIN ADULT PO) Take by mouth      Zinc Acetate, Oral, (ZINC ACETATE PO) Take 22 mg by mouth      acetaminophen (TYLENOL) 500 MG tablet Take 500 mg by mouth 2 times daily      apixaban (ELIQUIS) 5 MG TABS tablet Take 5 mg by mouth 2 times daily      diclofenac sodium (VOLTAREN) 1 % GEL Apply 2 g topically 4 times daily as needed      diclofenac (VOLTAREN) 75 MG EC tablet Take 75 mg by mouth 2 times daily      melatonin 3 MG TABS tablet Take by mouth      urea (CARMOL) 20 % cream Apply topically daily      rosuvastatin (CRESTOR) 20 MG tablet Take 40 mg by mouth (Patient not taking: Reported on 6/15/2022)       No current facility-administered medications for this visit. No Known Allergies  [unfilled]  Past Medical History:   Diagnosis Date    Atrial fibrillation ([address]     Dx in      Past Surgical History:   Procedure Laterality Date    HERNIA REPAIR Right      History reviewed. No pertinent family history. Social History     Tobacco Use    Smoking status: Former Smoker     Packs/day: 0.50    Smokeless tobacco: Never Used    Tobacco comment: Quit smoking: quit about 15 years ago   Substance Use Topics    Alcohol use: Never       PHYSICAL EXAM:    /78   Pulse 64 Comment: per EKG  Ht 6' (1.829 m)   Wt 164 lb (74.4 kg)   BMI 22.24 kg/m²      Wt Readings from Last 5 Encounters:   06/15/22 164 lb (74.4 kg)   12/15/21 172 lb 12.8 oz (78.4 kg)   12/08/21 176 lb (79.8 kg)   11/03/21 176 lb (79.8 kg)   09/08/21 176 lb (79.8 kg)       BP Readings from Last 5 Encounters:   06/15/22 132/78   03/10/22 128/88   12/15/21 124/76   11/03/21 126/72   09/08/21 104/70       General appearance: Alert, well appearing, and in no distress   CV: RRR, no M/R/G, no JVD, normal distal pulses, no lower extremity edema  Pulm: Clear to auscultation, no wheezes, no crackles, no accessory muscle use  GI: Soft, nontender, nondistended  Neuro: Alert and oriented    Medical problems and test results were reviewed with the patient today. Lab Results   Component Value Date    K 4.5 09/27/2021     CREATININE   Date Value Ref Range Status   09/27/2021 1.05 0.8 - 1.5 MG/DL Final     Lab Results   Component Value Date    HGB 13.5 09/27/2021     Lab Results   Component Value Date    INR 1.1 09/27/2021       EKG: (EKG has been independently visualized by me with interpretation below)  Sinus  Rhythm, rate 64  -Incomplete right bundle branch block. Diagnoses and all orders for this visit:    Persistent atrial fibrillation (HCC)  -     EKG 12 lead    Dilated cardiomyopathy ([address]  -     EKG 12 lead        ASSESSMENT and PLAN  1. Persistent atrial fibrillation: doing well s/p ablation, maintaining NSR off meds     2. NICM, EF 30-35%: tachy mediated, EF now 50-55% with maintenance of NSR     3. CVA protection: eliquis 5mg, CHADSVasc =1, OK to stop at this time    Patient has been instructed and agrees to call our office with any issues or other concerns related to their cardiac condition(s) and/or complaint(s). No follow-up provider specified. Leonardo Hurley MD, MS  Cardiology/Electrophysiology  6/15/2022  10:09 AM

## 2022-09-14 LAB — PROSTATE SPECIFIC ANTIGEN: 0.51 NG/ML

## 2022-09-28 LAB — PROSTATE SPECIFIC ANTIGEN: 0.66 NG/ML

## 2022-11-07 ENCOUNTER — OFFICE VISIT (OUTPATIENT)
Dept: NEUROSURGERY | Age: 67
End: 2022-11-07
Payer: OTHER GOVERNMENT

## 2022-11-07 VITALS
HEIGHT: 72 IN | DIASTOLIC BLOOD PRESSURE: 84 MMHG | TEMPERATURE: 98.1 F | SYSTOLIC BLOOD PRESSURE: 152 MMHG | HEART RATE: 68 BPM | OXYGEN SATURATION: 98 % | BODY MASS INDEX: 21.54 KG/M2 | WEIGHT: 159 LBS

## 2022-11-07 DIAGNOSIS — M51.36 DEGENERATIVE DISC DISEASE, LUMBAR: ICD-10-CM

## 2022-11-07 DIAGNOSIS — M48.062 LUMBAR STENOSIS WITH NEUROGENIC CLAUDICATION: Primary | ICD-10-CM

## 2022-11-07 DIAGNOSIS — M48.02 CERVICAL SPINAL STENOSIS: ICD-10-CM

## 2022-11-07 DIAGNOSIS — M54.12 CERVICAL RADICULOPATHY: ICD-10-CM

## 2022-11-07 DIAGNOSIS — M50.30 DEGENERATIVE DISC DISEASE, CERVICAL: ICD-10-CM

## 2022-11-07 PROCEDURE — 1123F ACP DISCUSS/DSCN MKR DOCD: CPT | Performed by: NEUROLOGICAL SURGERY

## 2022-11-07 PROCEDURE — 99204 OFFICE O/P NEW MOD 45 MIN: CPT | Performed by: NEUROLOGICAL SURGERY

## 2022-11-07 RX ORDER — DIAPER,BRIEF,ADULT, DISPOSABLE
EACH MISCELLANEOUS
COMMUNITY

## 2022-11-07 RX ORDER — CLOTRIMAZOLE 1 %
CREAM (GRAM) TOPICAL 2 TIMES DAILY
COMMUNITY

## 2022-11-07 RX ORDER — MORPHINE SULFATE 15 MG/1
15 TABLET ORAL EVERY 4 HOURS PRN
COMMUNITY

## 2022-11-07 ASSESSMENT — ENCOUNTER SYMPTOMS: BACK PAIN: 1

## 2022-11-07 NOTE — PROGRESS NOTES
[facility] NOTE:   History of Present Illness:    CC: Pain everywhere    Kuldeep Diamond is a 74 y.o. male who presents today for evaluation of pain everywhere. The patient dates he has 30 years worth of low back pain. He worked in construction for many years and states that has been hard on his back. He endorses pain in his low back and numbness and tingling in his lower extremities as well as fatigue in his legs with walking, he also endorses numbness and tingling is worse at night when he is lying flat on his back. He has worked with physical therapy multiple times throughout the years and has been doing physician directed home exercise program over the course the last 12 months. He also endorses pain radiating from his neck and between his shoulder blades into his left arm into the lateral ring and pinky finger. He has not done any recent epidural steroid injections he was referred here given the degree of his spinal stenosis. He treats his pain discomfort with celecoxib and has had injections in the past.  He has not take any antiplatelet or anticoagulant medications. The patient has an MRI lumbar spine without contrast performed at [address] [place] [address] on 10/30/2021 that demonstrates advanced degenerative disc disease with significant degenerative disc height loss affecting L1-L2 L2-L3 L3-L4 and L4-L5 and L5-S1. L2-L3 there is a left foraminal disc protrusion. At L3-L4 there is advanced facet arthropathy that when coupled with ligamentum flavum hypertrophy results in moderate to severe central spinal stenosis at L4-L5 there is advanced facet arthropathy and congenitally short pedicles when coupled with ligamentum flavum hypertrophy results in moderate to severe central spinal stenosis. There is advanced Modic endplate changes of the lumbar vertebra. Advanced lumbar scoliosis and spondylosis.   The patient has an MRI cervical spine without contrast performed on 10/30/2021 that demonstrates multilevel cervical spondylosis with mild spinal stenosis at C3-C4 and C4-C5 there is a broad-based disc bulge when coupled with a congenitally narrow canal that results in moderate central canal stenosis. At C5-C6 there is a broad-based and slightly left eccentric disc osteophyte complex that when coupled with a congenitally narrow canal results in moderate central spinal stenosis. At C6-C7 there is a broad-based disc osteophyte complex. At C7-T1 there is what appears to be an old T1 vertebral body fracture and degenerative disc disease and a grade 1 spondylolisthesis of C7 in relation to T1. There is also partial ankylosis of C7 in relation to T1. There is moderate left C3-C4 and left C5-C6 neuroforaminal stenosis. There is no evidence of intrinsic cord signal change    Past Medical History:   Diagnosis Date    Atrial fibrillation ([address]     Dx in      Past Surgical History:   Procedure Laterality Date    HERNIA REPAIR Right      No Known Allergies   No family history on file.    Social History     Socioeconomic History    Marital status: Single     Spouse name: Not on file    Number of children: Not on file    Years of education: Not on file    Highest education level: Not on file   Occupational History    Not on file   Tobacco Use    Smoking status: Former     Packs/day: 0.50     Types: Cigarettes    Smokeless tobacco: Never    Tobacco comments:     Quit smoking: quit about 15 years ago   Substance and Sexual Activity    Alcohol use: Never    Drug use: Not on file    Sexual activity: Not on file   Other Topics Concern    Not on file   Social History Narrative    Not on file     Social Determinants of Health     Financial Resource Strain: Not on file   Food Insecurity: Not on file   Transportation Needs: Not on file   Physical Activity: Not on file   Stress: Not on file   Social Connections: Not on file   Intimate Partner Violence: Not on file Housing Stability: Not on file     Current Outpatient Medications   Medication Sig Dispense Refill    Celecoxib (CELEBREX PO) Take by mouth      morphine (MSIR) 15 MG tablet Take 15 mg by mouth every 4 hours as needed for Pain. clotrimazole (LOTRIMIN) 1 % cream Apply topically 2 times daily Apply topically 2 times daily. CALCIUM PO Take by mouth      UNABLE TO FIND Collagen peptides      UNABLE TO FIND BCAA      CREATINE MONOHYDRATE PO Take by mouth      Whey Protein POWD Take by mouth      magnesium oxide (MAG-OX) 400 MG tablet Take 400 mg by mouth daily      Multiple Vitamin (MULTIVITAMIN ADULT PO) Take by mouth      Zinc Acetate, Oral, (ZINC ACETATE PO) Take 22 mg by mouth      acetaminophen (TYLENOL) 500 MG tablet Take 500 mg by mouth 2 times daily      diclofenac sodium (VOLTAREN) 1 % GEL Apply 2 g topically 4 times daily as needed      melatonin 3 MG TABS tablet Take by mouth      urea (CARMOL) 20 % cream Apply topically daily       No current facility-administered medications for this visit. Patient Active Problem List   Diagnosis    Hyperlipidemia    Chest pain    PEREZ (dyspnea on exertion)    Persistent atrial fibrillation (HCC)    Cardiomyopathy (HCC)    Paroxysmal atrial fibrillation ([address]        Review of Systems   Musculoskeletal:  Positive for back pain, myalgias and neck pain. Neurological:  Positive for numbness. All other systems reviewed and are negative.     Physical Exam:  BP (!) 152/84   Pulse 68   Temp 98.1 °F (36.7 °C) (Temporal)   Ht 6' (1.829 m)   Wt 159 lb (72.1 kg)   SpO2 98%   BMI 21.56 kg/m²   Pain Score:   3  Head normocephalic and atraumatic  Mood and affect appropriate  General: No acute distress  CV: Regular rate  Resp: No increased work of breathing  Skin: warm and dry  Awake, alert, and oriented   Speech Fluent  Eyes open spontaneously   Hearing grossly intact  Face symmetric and tongue midline on protrusion  Sternocleidomastoid and trapezius strength 5/5  Patient with strength exam as follows:   Upper Extremities: Right Left      Deltoid  5 5    Biceps  5 5    Triceps  5 5      5 5     Lower Extremities:      Hip Flex 5 5    Quads  5 5    Hamstrings 5 5    Dorsiflex 5 5    Plantarflex 5 5    EHL  5 5  No evidence of thenar atrophy  Sensation grossly intact  DTR absent right patellar response 2+ left patellar response  No clonus or babinski present   No Paiz's sign present bilaterally   Negative straight leg raise test on the right and left  Gait: Ambulates with a slightly hunched forward posture at the waist    Assessment & Plan:  Bebe Wall is a 74 y.o. male who presents today for evaluation of pain everywhere. I have independently reviewed and interpreted the patient's MRI lumbar spine without contrast performed at [address] [place] [address] on 10/30/2021 that demonstrates advanced degenerative disc disease with significant degenerative disc height loss affecting L1-L2 L2-L3 L3-L4 and L4-L5 and L5-S1. L2-L3 there is a left foraminal disc protrusion. At L3-L4 there is advanced facet arthropathy that when coupled with ligamentum flavum hypertrophy results in moderate to severe central spinal stenosis at L4-L5 there is advanced facet arthropathy and congenitally short pedicles when coupled with ligamentum flavum hypertrophy results in moderate to severe central spinal stenosis. There is advanced Modic endplate changes of the lumbar vertebra. Advanced lumbar scoliosis and spondylosis. The patient has an MRI cervical spine without contrast performed on 10/30/2021 that demonstrates multilevel cervical spondylosis with mild spinal stenosis at C3-C4 and C4-C5 there is a broad-based disc bulge when coupled with a congenitally narrow canal that results in moderate central canal stenosis.   At C5-C6 there is a broad-based and slightly left eccentric disc osteophyte complex that when coupled with a congenitally narrow canal results in moderate central spinal stenosis. At C6-C7 there is a broad-based disc osteophyte complex. At C7-T1 there is what appears to be an old T1 vertebral body fracture and degenerative disc disease and a grade 1 spondylolisthesis of C7 in relation to T1. There is also partial ankylosis of C7 in relation to T1. There is moderate left C3-C4 and left C5-C6 neuroforaminal stenosis. There is no evidence of intrinsic cord signal change. At this time the patient has some intermittent radicular symptoms in his upper extremities but his biggest complaint seems to his low back and his lower extremities. He does not have any myelopathic findings on physical examination his MRI only demonstrates moderate spinal stenosis. I would like to obtain flexion-extension views to ensure there is no gross instability at C7-T1 level given the partial ankylosis at C7-T1. I would also like to obtain flexion-extension views of the lumbar spine to ensure there is no pathologic motion. I will however also like to obtain repeat MRI of the cervical spine and lumbar spine and an MRI for now greater than 3year old which will help with accurate surgical planning. The patient has failed conservative measures in the form of physical therapy multiple times as well as a home directed exercise regimen. I believe you will likely benefit from an L3-L4 and L4-L5 midline bilateral lumbar laminectomy and bilateral medial facetectomy for decompression. Regarding his cervical spine to we will also ensure that no surgery needs to be done prior to proceeding with surgery for his lumbar spine. We will see him back following the acquisition of the aforementioned studies. ICD-10-CM    1. Lumbar stenosis with neurogenic claudication  M48.062       2. Degenerative disc disease, lumbar  M51.36       3. Degenerative disc disease, cervical  M50.30       4. Cervical spinal stenosis  M48.02       5. Cervical radiculopathy  M54.12           Ayden Mai, [facility], [address]     Notes are transcribed with [address] [place] [address], a medical voice recording dictation service, and may contain minor errors.

## 2022-11-18 ENCOUNTER — APPOINTMENT (OUTPATIENT)
Dept: CT IMAGING | Age: 67
End: 2022-11-18
Payer: OTHER GOVERNMENT

## 2022-11-18 ENCOUNTER — HOSPITAL ENCOUNTER (OUTPATIENT)
Dept: CT IMAGING | Age: 67
Discharge: HOME | End: 2022-11-21
Payer: OTHER GOVERNMENT

## 2022-11-18 ENCOUNTER — HOSPITAL ENCOUNTER (OUTPATIENT)
Dept: MRI IMAGING | Age: 67
Discharge: HOME | End: 2022-11-21
Payer: OTHER GOVERNMENT

## 2022-11-18 ENCOUNTER — HOSPITAL ENCOUNTER (OUTPATIENT)
Dept: GENERAL RADIOLOGY | Age: 67
Discharge: HOME | End: 2022-11-21
Payer: OTHER GOVERNMENT

## 2022-11-18 DIAGNOSIS — M50.30 DEGENERATIVE DISC DISEASE, CERVICAL: ICD-10-CM

## 2022-11-18 DIAGNOSIS — M48.062 LUMBAR STENOSIS WITH NEUROGENIC CLAUDICATION: ICD-10-CM

## 2022-11-18 DIAGNOSIS — M51.36 DEGENERATIVE DISC DISEASE, LUMBAR: ICD-10-CM

## 2022-11-18 DIAGNOSIS — M48.02 CERVICAL SPINAL STENOSIS: ICD-10-CM

## 2022-11-18 DIAGNOSIS — M54.12 CERVICAL RADICULOPATHY: ICD-10-CM

## 2022-11-18 DIAGNOSIS — Z01.89 ENCOUNTER FOR OTHER SPECIFIED SPECIAL EXAMINATIONS: ICD-10-CM

## 2022-11-18 LAB — CREAT BLD-MCNC: 0.99 MG/DL (ref 0.8–1.5)

## 2022-11-18 PROCEDURE — 2580000003 HC RX 258: Performed by: INTERNAL MEDICINE

## 2022-11-18 PROCEDURE — 72120 X-RAY BEND ONLY L-S SPINE: CPT

## 2022-11-18 PROCEDURE — 72148 MRI LUMBAR SPINE W/O DYE: CPT

## 2022-11-18 PROCEDURE — 72141 MRI NECK SPINE W/O DYE: CPT

## 2022-11-18 PROCEDURE — 72040 X-RAY EXAM NECK SPINE 2-3 VW: CPT

## 2022-11-18 PROCEDURE — 6360000004 HC RX CONTRAST MEDICATION: Performed by: INTERNAL MEDICINE

## 2022-11-18 PROCEDURE — 82565 ASSAY OF CREATININE: CPT

## 2022-11-18 PROCEDURE — 74177 CT ABD & PELVIS W/CONTRAST: CPT

## 2022-11-18 PROCEDURE — 74176 CT ABD & PELVIS W/O CONTRAST: CPT

## 2022-11-18 RX ORDER — 0.9 % SODIUM CHLORIDE 0.9 %
100 INTRAVENOUS SOLUTION INTRAVENOUS ONCE
Status: COMPLETED | OUTPATIENT
Start: 2022-11-18 | End: 2022-11-18

## 2022-11-18 RX ORDER — SODIUM CHLORIDE 0.9 % (FLUSH) 0.9 %
10 SYRINGE (ML) INJECTION
Status: COMPLETED | OUTPATIENT
Start: 2022-11-18 | End: 2022-11-18

## 2022-11-18 RX ADMIN — SODIUM CHLORIDE 100 ML: 9 INJECTION, SOLUTION INTRAVENOUS at 11:50

## 2022-11-18 RX ADMIN — IOPAMIDOL 100 ML: 755 INJECTION, SOLUTION INTRAVENOUS at 11:50

## 2022-11-18 RX ADMIN — SODIUM CHLORIDE, PRESERVATIVE FREE 10 ML: 5 INJECTION INTRAVENOUS at 11:50

## 2022-11-18 RX ADMIN — DIATRIZOATE MEGLUMINE AND DIATRIZOATE SODIUM 15 ML: 660; 100 LIQUID ORAL; RECTAL at 11:48

## 2022-12-12 ENCOUNTER — OFFICE VISIT (OUTPATIENT)
Dept: NEUROSURGERY | Age: 67
End: 2022-12-12
Payer: OTHER GOVERNMENT

## 2022-12-12 VITALS
BODY MASS INDEX: 22.08 KG/M2 | OXYGEN SATURATION: 99 % | SYSTOLIC BLOOD PRESSURE: 161 MMHG | TEMPERATURE: 98.2 F | DIASTOLIC BLOOD PRESSURE: 83 MMHG | HEIGHT: 72 IN | HEART RATE: 83 BPM | WEIGHT: 163 LBS

## 2022-12-12 DIAGNOSIS — M43.12 ACQUIRED SPONDYLOLISTHESIS OF CERVICAL VERTEBRA: ICD-10-CM

## 2022-12-12 DIAGNOSIS — M41.56 SCOLIOSIS OF LUMBAR REGION DUE TO DEGENERATIVE DISEASE OF SPINE IN ADULT: ICD-10-CM

## 2022-12-12 DIAGNOSIS — M48.02 CERVICAL SPINAL STENOSIS: ICD-10-CM

## 2022-12-12 DIAGNOSIS — M48.061 NEUROFORAMINAL STENOSIS OF LUMBAR SPINE: ICD-10-CM

## 2022-12-12 DIAGNOSIS — M47.816 LUMBAR SPONDYLOSIS: Primary | ICD-10-CM

## 2022-12-12 DIAGNOSIS — M47.816 FACET ARTHROPATHY, LUMBAR: ICD-10-CM

## 2022-12-12 DIAGNOSIS — M51.36 DEGENERATIVE DISC DISEASE, LUMBAR: ICD-10-CM

## 2022-12-12 DIAGNOSIS — M50.30 DEGENERATIVE DISC DISEASE, CERVICAL: ICD-10-CM

## 2022-12-12 DIAGNOSIS — M54.12 CERVICAL RADICULOPATHY: ICD-10-CM

## 2022-12-12 DIAGNOSIS — M48.062 LUMBAR STENOSIS WITH NEUROGENIC CLAUDICATION: ICD-10-CM

## 2022-12-12 PROCEDURE — 1123F ACP DISCUSS/DSCN MKR DOCD: CPT | Performed by: NEUROLOGICAL SURGERY

## 2022-12-12 PROCEDURE — 99214 OFFICE O/P EST MOD 30 MIN: CPT | Performed by: NEUROLOGICAL SURGERY

## 2022-12-12 NOTE — PROGRESS NOTES
[facility] CLINIC NOTE:   History of Present Illness:    CC: Follow-up evaluation of pain in the neck and numbness and pain radiating to his pinky and ring finger in the left greater than right upper extremity, chronic low back pain with numbness and tingling in his lower extremities as well as fatigue and walking    Grace Brody is a 74 y.o. male who presents today for follow-up evaluation of evaluation of  pain in the neck and numbness and pain radiating to his pinky and ring finger in the left greater than right upper extremity, chronic low back pain with numbness and tingling in his lower extremities as well as fatigue and walking. The patient states that he has worked with physical therapy multiple times throughout the years and is doing physician directed home exercise program over the last 12 months. He endorses pain radiating from his neck between the shoulder blades and into his left arm to the lateral ring and pinky finger. He is here for follow-up and rates his pain as a 4 out of 10 on a visual analog pain scale. He also endorses low back pain. He has tried celecoxib with respect to his low back. Patient states it is hard to hold onto objects secondary to his osteoarthritis and numbness and tingling in his fingers. The patient has an MRI lumbar spine performed on 11/18/2022 that demonstrates advanced degenerative disc disease affecting the lumbar spine at every level significantly at L2-L3 L3-L4 and L4-L5 and L5-S1 where there is complete disc height collapse and Modic endplate changes. At L2-L3 there is some mild facet arthropathy at L3-L4 there is advanced facet hypertrophy with thickening of the ligamentum flavum that results in moderate to severe central spinal stenosis.   At L3-L4 there is moderate to severe central lateral recess spinal stenosis secondary to facet arthropathy hypertrophy ligamentum flavum hypertrophy and degenerative disc disease at L5-S1 there is advanced facet hypertrophy and some mild to moderate lateral recess stenosis without significant central spinal stenosis. Patient has a mild degenerative scoliotic deformity with some lateral listhesis of L4 upon L5 to the left. At L3-L4 there is moderate left and moderate to severe right neuroforaminal stenosis. At L4-L5 there is moderate right and mild to moderate left neuroforaminal stenosis. At L5-S1 there is mild to moderate bilateral neuroforaminal stenosis. In comparison to an MRI performed on 10/30/2021 there has been some mild interval progression of his lumbar spondylosis. The patient has an MRI cervical spine without contrast performed at [address] [place] [address] on 11/18/2022 that demonstrates cervical spondylosis at multiple levels with mild spinal stenosis appreciable at C3-C4 moderate to severe right neuroforaminal stenosis at C4-C5 there is mild spinal stenosis at C5-C6 with mild spinal stenosis there is advanced disc height loss at C7-T1 with mild anterolisthesis and a chronic appearing compression deformity at C7 and C7-T1. There is no definitive intrinsic cord signal change. This does not appear significantly changed from an MRI compared to that was performed on 10/30/2021. Past Medical History:   Diagnosis Date    Atrial fibrillation ([address]     Dx in       Past Surgical History:   Procedure Laterality Date    HERNIA REPAIR Right      No Known Allergies   No family history on file.    Social History     Socioeconomic History    Marital status: Single     Spouse name: Not on file    Number of children: Not on file    Years of education: Not on file    Highest education level: Not on file   Occupational History    Not on file   Tobacco Use    Smoking status: Former     Packs/day: 0.50     Types: Cigarettes    Smokeless tobacco: Never    Tobacco comments:     Quit smoking: quit about 15 years ago   Substance and Sexual Activity    Alcohol use: Never    Drug use: Not on file Sexual activity: Not on file   Other Topics Concern    Not on file   Social History Narrative    Not on file     Social Determinants of Health     Financial Resource Strain: Not on file   Food Insecurity: Not on file   Transportation Needs: Not on file   Physical Activity: Not on file   Stress: Not on file   Social Connections: Not on file   Intimate Partner Violence: Not on file   Housing Stability: Not on file     Current Outpatient Medications   Medication Sig Dispense Refill    Celecoxib (CELEBREX PO) Take by mouth      morphine (MSIR) 15 MG tablet Take 15 mg by mouth every 4 hours as needed for Pain. BID      clotrimazole (LOTRIMIN) 1 % cream Apply topically 2 times daily Apply topically 2 times daily. CALCIUM PO Take by mouth      UNABLE TO FIND Collagen peptides      UNABLE TO FIND BCAA      CREATINE MONOHYDRATE PO Take by mouth      Whey Protein POWD Take by mouth      magnesium oxide (MAG-OX) 400 MG tablet Take 400 mg by mouth daily      Multiple Vitamin (MULTIVITAMIN ADULT PO) Take by mouth      Zinc Acetate, Oral, (ZINC ACETATE PO) Take 22 mg by mouth      acetaminophen (TYLENOL) 500 MG tablet Take 500 mg by mouth 2 times daily      diclofenac sodium (VOLTAREN) 1 % GEL Apply 2 g topically 4 times daily as needed      melatonin 3 MG TABS tablet Take by mouth      urea (CARMOL) 20 % cream Apply topically daily       No current facility-administered medications for this visit.      Patient Active Problem List   Diagnosis    Hyperlipidemia    Chest pain    PEREZ (dyspnea on exertion)    Persistent atrial fibrillation (HCC)    Cardiomyopathy (HCC)    Paroxysmal atrial fibrillation (HCC)        Review of Systems    Physical Exam:  BP (!) 161/83   Pulse 83   Temp 98.2 °F (36.8 °C) (Temporal)   Ht 6' (1.829 m)   Wt 163 lb (73.9 kg)   SpO2 99%   BMI 22.11 kg/m²   Pain Score:   4  Head normocephalic and atraumatic  Mood and affect appropriate  General: No acute distress  CV: Regular rate  Resp: No increased work of breathing  Skin: warm and dry  Awake, alert, and oriented   Speech Fluent  Eyes open spontaneously   Hearing grossly intact  Face symmetric and tongue midline on protrusion  Sternocleidomastoid and trapezius strength 5/5  Patient with strength exam as follows:   Upper Extremities:      Right    Left                             Deltoid             5          5                          Biceps             5          5                          Triceps                        5          5                                           5          5                 Lower Extremities:                             Hip Flex           5          5                          Quads              5          5                          Hamstrings      5          5                          Dorsiflex          5          5                          Plantarflex       5          5                          EHL                 5          5  No evidence of thenar atrophy  Sensation grossly intact  DTR absent right patellar response 2+ left patellar response  No clonus or babinski present   No Paiz's sign present bilaterally   Negative straight leg raise test on the right and left  Gait: Ambulates with a slightly hunched forward posture at the waist    Assessment & Plan:    Augustus Sanchez is a 74 y.o. male who presents today for follow-up evaluation of evaluation of  pain in the neck and numbness and pain radiating to his pinky and ring finger in the left greater than right upper extremity, chronic low back pain with numbness and tingling in his lower extremities as well as fatigue and walking.    I have independently reviewed and interpreted the patient's MRI cervical spine without contrast performed at [facility] on 11/18/2022 that demonstrates cervical spondylosis at multiple levels with mild spinal stenosis appreciable at C3-C4 moderate to severe right neuroforaminal stenosis at C4-C5 there is mild spinal stenosis at C5-C6 with mild spinal stenosis there is advanced disc height loss at C7-T1 with mild anterolisthesis and a chronic appearing compression deformity at C7 and C7-T1. There is no definitive intrinsic cord signal change. This does not appear significantly changed from an MRI compared to that was performed on 10/30/2021. I have independently reviewed and interpreted the patient's MRI lumbar spine performed on 11/18/2022 that demonstrates advanced degenerative disc disease affecting the lumbar spine at every level significantly at L2-L3 L3-L4 and L4-L5 and L5-S1 where there is complete disc height collapse and Modic endplate changes. At L2-L3 there is some mild facet arthropathy at L3-L4 there is advanced facet hypertrophy with thickening of the ligamentum flavum that results in moderate to severe central spinal stenosis. At L3-L4 there is moderate to severe central lateral recess spinal stenosis secondary to facet arthropathy hypertrophy ligamentum flavum hypertrophy and degenerative disc disease at L5-S1 there is advanced facet hypertrophy and some mild to moderate lateral recess stenosis without significant central spinal stenosis. Patient has a mild degenerative scoliotic deformity with some lateral listhesis of L4 upon L5 to the left. At L3-L4 there is moderate left and moderate to severe right neuroforaminal stenosis. At L4-L5 there is moderate right and mild to moderate left neuroforaminal stenosis. At L5-S1 there is mild to moderate bilateral neuroforaminal stenosis. In comparison to an MRI performed on 10/30/2021 there has been some mild interval progression of his lumbar spondylosis. At this time the patient is most symptomatic with respect to his chronic neck pain as well as the numbness and tingling radiating into the lateral pinky and ring finger that is now also affecting the right side.   Seems most consistent with a C8 radiculopathy which is likely secondary to the anterior listhesis and neuroforaminal stenosis at C7-T1. I believe that in order to 6 the patient's current cervical spinal stenosis that is multilevel as well as his neuroforaminal stenosis at multiple levels and treat his chronic neck pain that he would most likely benefit from a C3-T1 posterior cervical instrumented fixation with lateral mass arthrodesis and decompression from C3-T1 including foraminotomies at C7-T1. Regarding his low back and signs of lumbar spinal stenosis with neurogenic claudication he has advanced complete disc height collapse at multiple levels I believe that a lumbar spinal fusion deformity correction will be aggressive at this point and that he may just benefit at his age with his degree of osteoarthritis from an L3-L4 and L4-L5 midline lumbar laminectomy bilateral medial facetectomies bilaterally for decompression. The patient would like to go home and watching videos via surgeons that I posted them to online video media services such as fitogram or other various avenues. He would like to think about his options prior to proceeding with surgery he will call us back to proceed with surgical planning should he wish to proceed with surgery at that time. ICD-10-CM    1. Lumbar spondylosis  M47.816       2. Lumbar stenosis with neurogenic claudication  M48.062       3. Degenerative disc disease, lumbar  M51.36       4. Scoliosis of lumbar region due to degenerative disease of spine in adult  M41.56       5. Neuroforaminal stenosis of lumbar spine  M48.061       6. Facet arthropathy, lumbar  M47.816       7. Cervical spinal stenosis  M48.02       8. Degenerative disc disease, cervical  M50.30       9. Cervical radiculopathy  M54.12       10. Acquired spondylolisthesis of cervical vertebra  M43.12         Ayden VELIZ  [address]     Notes are transcribed with Felicia Tierney, a medical voice recording dictation service, and may contain minor errors.

## 2022-12-14 ENCOUNTER — TELEPHONE (OUTPATIENT)
Dept: NEUROSURGERY | Age: 67
End: 2022-12-14

## 2022-12-14 NOTE — TELEPHONE ENCOUNTER
Answered various questions for the patient- he has some procedural questions for Dr. Sabas Dumas after videos and articles  Approx length of time of surgery  Does Dr. Sabas Dumas remove the lamina before or after screw placement? Is the spinal cord protected or left exposed?

## 2022-12-15 NOTE — TELEPHONE ENCOUNTER
DR. Cha Diaz completed questions for patient.  Notified patient of responding answers- patient understood

## 2022-12-16 ENCOUNTER — PREP FOR PROCEDURE (OUTPATIENT)
Dept: NEUROSURGERY | Age: 67
End: 2022-12-16

## 2022-12-16 ENCOUNTER — TELEPHONE (OUTPATIENT)
Dept: NEUROSURGERY | Age: 67
End: 2022-12-16

## 2022-12-16 DIAGNOSIS — M48.02 CERVICAL SPINAL STENOSIS: ICD-10-CM

## 2022-12-16 DIAGNOSIS — M47.816 LUMBAR SPONDYLOSIS: Primary | ICD-10-CM

## 2022-12-16 DIAGNOSIS — M43.12 ACQUIRED SPONDYLOLISTHESIS OF CERVICAL VERTEBRA: ICD-10-CM

## 2022-12-16 DIAGNOSIS — M50.30 DEGENERATIVE DISC DISEASE, CERVICAL: ICD-10-CM

## 2022-12-16 DIAGNOSIS — M54.12 CERVICAL RADICULOPATHY: ICD-10-CM

## 2022-12-19 NOTE — TELEPHONE ENCOUNTER
Patient called he has [place] and would like to get a \"jump\" on post op equipment  Orders will be written per standing orders   Bedside commode  Bedside wedge- 8\"  Answered question on wound closure with antibiotic irrigation and Vancomycin powder  Ok to use Voltaren jel on knees postop   Will email 2 orders to [email or web address]

## 2022-12-20 ENCOUNTER — TELEPHONE (OUTPATIENT)
Dept: NEUROSURGERY | Age: 67
End: 2022-12-20

## 2022-12-20 NOTE — TELEPHONE ENCOUNTER
Completed [facility] forms for Dr. Talia Vieyra signature. Patient has another question for Dr. Gildardo Caceres- he has been evaluated for frequent urination issues- perhaps could this be coming from lumbar nerve issues?

## 2023-01-09 ENCOUNTER — TELEPHONE (OUTPATIENT)
Dept: NEUROSURGERY | Age: 68
End: 2023-01-09

## 2023-01-09 DIAGNOSIS — M54.12 CERVICAL RADICULOPATHY: ICD-10-CM

## 2023-01-09 DIAGNOSIS — M43.12 ACQUIRED SPONDYLOLISTHESIS OF CERVICAL VERTEBRA: ICD-10-CM

## 2023-01-09 DIAGNOSIS — M50.30 DEGENERATIVE DISC DISEASE, CERVICAL: ICD-10-CM

## 2023-01-09 DIAGNOSIS — M48.02 CERVICAL SPINAL STENOSIS: Primary | ICD-10-CM

## 2023-01-09 NOTE — TELEPHONE ENCOUNTER
Pt needs to add a few things to an order for post op materials that hes requesting? Also pts prior order was sent to the wrong pcp  [facility] is [facility], please call.

## 2023-01-09 NOTE — TELEPHONE ENCOUNTER
Patient states the DME scripts and [facility] paper work went to a private practice previous PCP.  Patient states Theodor Done has the correct [place] fax #-   Patient also req other DME supports  Grabber   Cervical neck pillow   Wrap around heating pad  Will fax orders above to previous [place] fax # in [place] to [phone]  Informed patient will e-mail him new orders and previous completed forms

## 2023-01-10 ENCOUNTER — HOSPITAL ENCOUNTER (OUTPATIENT)
Dept: PREADMISSION TESTING | Age: 68
Discharge: HOME | End: 2023-01-13
Payer: OTHER GOVERNMENT

## 2023-01-10 VITALS
DIASTOLIC BLOOD PRESSURE: 85 MMHG | HEIGHT: 72 IN | SYSTOLIC BLOOD PRESSURE: 147 MMHG | HEART RATE: 59 BPM | WEIGHT: 162.1 LBS | BODY MASS INDEX: 21.96 KG/M2 | RESPIRATION RATE: 18 BRPM | TEMPERATURE: 98.1 F

## 2023-01-10 LAB
ANION GAP SERPL CALC-SCNC: 5 MMOL/L (ref 2–11)
BACTERIA SPEC CULT: NORMAL
BUN SERPL-MCNC: 20 MG/DL (ref 8–23)
CALCIUM SERPL-MCNC: 9.6 MG/DL (ref 8.3–10.4)
CHLORIDE SERPL-SCNC: 104 MMOL/L (ref 101–110)
CO2 SERPL-SCNC: 29 MMOL/L (ref 21–32)
CREAT SERPL-MCNC: 0.91 MG/DL (ref 0.8–1.5)
ERYTHROCYTE [DISTWIDTH] IN BLOOD BY AUTOMATED COUNT: 12 % (ref 11.9–14.6)
GLUCOSE SERPL-MCNC: 87 MG/DL (ref 65–100)
HCT VFR BLD AUTO: 43.8 % (ref 41.1–50.3)
HGB BLD-MCNC: 14.3 G/DL (ref 13.6–17.2)
MCH RBC QN AUTO: 31 PG (ref 26.1–32.9)
MCHC RBC AUTO-ENTMCNC: 32.6 G/DL (ref 31.4–35)
MCV RBC AUTO: 94.8 FL (ref 82–102)
NRBC # BLD: 0 K/UL (ref 0–0.2)
PLATELET # BLD AUTO: 283 K/UL (ref 150–450)
PMV BLD AUTO: 10.1 FL (ref 9.4–12.3)
POTASSIUM SERPL-SCNC: 4 MMOL/L (ref 3.5–5.1)
RBC # BLD AUTO: 4.62 M/UL (ref 4.23–5.6)
SERVICE CMNT-IMP: NORMAL
SODIUM SERPL-SCNC: 138 MMOL/L (ref 133–143)
WBC # BLD AUTO: 7.5 K/UL (ref 4.3–11.1)

## 2023-01-10 PROCEDURE — 80048 BASIC METABOLIC PNL TOTAL CA: CPT

## 2023-01-10 PROCEDURE — 85027 COMPLETE CBC AUTOMATED: CPT

## 2023-01-10 PROCEDURE — 80307 DRUG TEST PRSMV CHEM ANLYZR: CPT

## 2023-01-10 PROCEDURE — 81003 URINALYSIS AUTO W/O SCOPE: CPT

## 2023-01-10 PROCEDURE — 87641 MR-STAPH DNA AMP PROBE: CPT

## 2023-01-10 RX ORDER — METOPROLOL TARTRATE 100 MG/1
100 TABLET ORAL 2 TIMES DAILY
COMMUNITY

## 2023-01-10 NOTE — PROGRESS NOTES
PLEASE CONTINUE TAKING ALL PRESCRIPTION MEDICATIONS UP TO THE DAY OF SURGERY UNLESS OTHERWISE DIRECTED BELOW. DISCONTINUE all vitamins and supplements 7 days prior to surgery. DISCONTINUE Non-Steriodal Anti-Inflammatory (NSAIDS) such as Advil and Aleve 5 days prior to surgery. Home Medications to take  the day of surgery    Metoprolol, Morphine           Home Medications   to Hold   Celebrex        Comments      On the day before surgery please take Acetaminophen 1000mg in the morning and then again before bed. You may substitute for Tylenol 650 mg. Please do not bring home medications with you on the day of surgery unless otherwise directed by your nurse. If you are instructed to bring home medications, please give them to your nurse as they will be administered by the nursing staff. If you have any questions, please call [address] [place] [address] [phone] or [address] [facility] [phone]. A copy of this note was provided to the patient for reference.

## 2023-01-10 NOTE — PROGRESS NOTES
Patient verified name and     Order for consent not found in EHR and matches case posting; patient verified. Type 3 surgery, walk in assessment complete. Labs per surgeon: none;  Labs per anesthesia protocol: cbc,bmp,ua,mrsa,ua tc; results pending  EK/15/22    MRSA/MSSA swab collected; pharmacy to review and dose antibiotic as appropriate. Hospital approved surgical skin cleanser and instructions given per hospital policy. Patient provided with and instructed on educational handouts including Guide to Surgery, Pain Management, Hand Hygiene, Blood Transfusion Education, and [facility] Brochure. Patient answered medical/surgical history questions at their best of ability. All prior to admission medications documented in [facility]. Original medication prescription bottle not visualized during patient appointment. Patient instructed to hold all vitamins 7 days prior to surgery and NSAIDS 5 days prior to surgery, patient verbalized understanding. Patient teach back successful and patient demonstrates knowledge of instructions.

## 2023-01-10 NOTE — PROGRESS NOTES
Latest Reference Range & Units 1/10/23 13:28   Sodium 133 - 143 mmol/L 138   Potassium 3.5 - 5.1 mmol/L 4.0   Chloride 101 - 110 mmol/L 104   CO2 21 - 32 mmol/L 29   BUN,BUNPL 8 - 23 MG/DL 20   Creatinine 0.8 - 1.5 MG/DL 0.91   Anion Gap 2 - 11 mmol/L 5   Est, Glom Filt Rate >60 ml/min/1.73m2 >60   Glucose, Random 65 - 100 mg/dL 87   CALCIUM, SERUM, 636235 8.3 - 10.4 MG/DL 9.6   WBC 4.3 - 11.1 K/uL 7.5   RBC 4.23 - 5.6 M/uL 4.62   Hemoglobin Quant 13.6 - 17.2 g/dL 14.3   Hematocrit 41.1 - 50.3 % 43.8   MCV 82.0 - 102.0 FL 94.8   MCH 26.1 - 32.9 PG 31.0   MCHC 31.4 - 35.0 g/dL 32.6   MPV 9.4 - 12.3 FL 10.1   RDW 11.9 - 14.6 % 12.0   Platelet Count 794 - 450 K/uL 283   Nucleated Red Blood Cells 0.0 - 0.2 K/uL 0.00

## 2023-01-11 LAB
COMMENT:: NORMAL
COTININE UR QL SCN: NEGATIVE NG/ML

## 2023-01-11 NOTE — PROGRESS NOTES
Received call from [facility] LAUR[facility] PATHAK [facility] that urine sample has been lost and unable to complete ordered tests. Dr. Gus Bagley notified, states ok to collect UA day of surgery and confirm with patient that he is a non smoker and does not use smokeless tobacco and if so, ok to discontinue urine nicotine test. Patient notified and confirms that he does not smoke or use smokeless tobacco.  Patient also states that he gave a urine sample to [facility] on 01/10/23 - call placed to obtain results.

## 2023-01-12 ENCOUNTER — CLINICAL DOCUMENTATION (OUTPATIENT)
Dept: NEUROSURGERY | Age: 68
End: 2023-01-12

## 2023-01-12 RX ORDER — OXYBUTYNIN CHLORIDE 10 MG/1
10 TABLET, EXTENDED RELEASE ORAL 2 TIMES DAILY
COMMUNITY

## 2023-01-12 NOTE — PERIOP NOTE
Received call from patient stating that he had a new medication \" Oxybutynin 10 mg BID, and needed to know if he could start prior to surgery. Medication list updated and patient informed he could take medication.

## 2023-01-13 DIAGNOSIS — M54.12 CERVICAL RADICULOPATHY: ICD-10-CM

## 2023-01-13 DIAGNOSIS — M50.30 DDD (DEGENERATIVE DISC DISEASE), CERVICAL: ICD-10-CM

## 2023-01-13 DIAGNOSIS — M48.02 CERVICAL STENOSIS OF SPINE: Primary | ICD-10-CM

## 2023-01-13 DIAGNOSIS — M43.12 SPONDYLOLISTHESIS OF CERVICAL REGION: ICD-10-CM

## 2023-01-16 ENCOUNTER — ANESTHESIA EVENT (OUTPATIENT)
Dept: SURGERY | Age: 68
End: 2023-01-16
Payer: OTHER GOVERNMENT

## 2023-01-17 ENCOUNTER — ANESTHESIA (OUTPATIENT)
Dept: SURGERY | Age: 68
End: 2023-01-17
Payer: OTHER GOVERNMENT

## 2023-01-17 ENCOUNTER — APPOINTMENT (OUTPATIENT)
Dept: GENERAL RADIOLOGY | Age: 68
End: 2023-01-17
Attending: NEUROLOGICAL SURGERY
Payer: OTHER GOVERNMENT

## 2023-01-17 ENCOUNTER — APPOINTMENT (OUTPATIENT)
Dept: CT IMAGING | Age: 68
End: 2023-01-17
Attending: NEUROLOGICAL SURGERY
Payer: OTHER GOVERNMENT

## 2023-01-17 ENCOUNTER — HOSPITAL ENCOUNTER (INPATIENT)
Age: 68
LOS: 4 days | Discharge: HOME | End: 2023-01-21
Attending: NEUROLOGICAL SURGERY | Admitting: NEUROLOGICAL SURGERY
Payer: OTHER GOVERNMENT

## 2023-01-17 DIAGNOSIS — M48.02 CERVICAL SPINAL STENOSIS: ICD-10-CM

## 2023-01-17 DIAGNOSIS — M48.02 CERVICAL STENOSIS OF SPINE: ICD-10-CM

## 2023-01-17 DIAGNOSIS — M43.12 ACQUIRED SPONDYLOLISTHESIS OF CERVICAL VERTEBRA: ICD-10-CM

## 2023-01-17 DIAGNOSIS — M50.30 DDD (DEGENERATIVE DISC DISEASE), CERVICAL: ICD-10-CM

## 2023-01-17 DIAGNOSIS — M54.12 CERVICAL RADICULOPATHY: ICD-10-CM

## 2023-01-17 DIAGNOSIS — M50.30 DEGENERATIVE DISC DISEASE, CERVICAL: ICD-10-CM

## 2023-01-17 DIAGNOSIS — M48.02 CERVICAL SPINAL STENOSIS: Primary | ICD-10-CM

## 2023-01-17 DIAGNOSIS — M43.12 SPONDYLOLISTHESIS OF CERVICAL REGION: ICD-10-CM

## 2023-01-17 LAB
ABO + RH BLD: NORMAL
APPEARANCE UR: CLEAR
BACTERIA URNS QL MICRO: NEGATIVE /HPF
BILIRUB UR QL: NEGATIVE
BLOOD GROUP ANTIBODIES SERPL: NORMAL
CASTS URNS QL MICRO: ABNORMAL /LPF
COLOR UR: ABNORMAL
EPI CELLS #/AREA URNS HPF: ABNORMAL /HPF
GLUCOSE UR STRIP.AUTO-MCNC: NEGATIVE MG/DL
HGB UR QL STRIP: NEGATIVE
KETONES UR QL STRIP.AUTO: NEGATIVE MG/DL
LEUKOCYTE ESTERASE UR QL STRIP.AUTO: NEGATIVE
NITRITE UR QL STRIP.AUTO: NEGATIVE
PH UR STRIP: 7.5 (ref 5–9)
PROT UR STRIP-MCNC: ABNORMAL MG/DL
RBC #/AREA URNS HPF: ABNORMAL /HPF
SP GR UR REFRACTOMETRY: 1.03 (ref 1–1.02)
SPECIMEN EXP DATE BLD: NORMAL
UROBILINOGEN UR QL STRIP.AUTO: 0.2 EU/DL (ref 0.2–1)
WBC URNS QL MICRO: ABNORMAL /HPF

## 2023-01-17 PROCEDURE — 3600000014 HC SURGERY LEVEL 4 ADDTL 15MIN: Performed by: NEUROLOGICAL SURGERY

## 2023-01-17 PROCEDURE — 3600000004 HC SURGERY LEVEL 4 BASE: Performed by: NEUROLOGICAL SURGERY

## 2023-01-17 PROCEDURE — 01N10ZZ RELEASE CERVICAL NERVE, OPEN APPROACH: ICD-10-PCS | Performed by: NEUROLOGICAL SURGERY

## 2023-01-17 PROCEDURE — 2500000003 HC RX 250 WO HCPCS: Performed by: NURSE ANESTHETIST, CERTIFIED REGISTERED

## 2023-01-17 PROCEDURE — 2580000003 HC RX 258: Performed by: NEUROLOGICAL SURGERY

## 2023-01-17 PROCEDURE — 6370000000 HC RX 637 (ALT 250 FOR IP): Performed by: NEUROLOGICAL SURGERY

## 2023-01-17 PROCEDURE — 6370000000 HC RX 637 (ALT 250 FOR IP): Performed by: NURSE PRACTITIONER

## 2023-01-17 PROCEDURE — 63015 REMOVE SPINE LAMINA >2 CRVCL: CPT | Performed by: NEUROLOGICAL SURGERY

## 2023-01-17 PROCEDURE — 81001 URINALYSIS AUTO W/SCOPE: CPT

## 2023-01-17 PROCEDURE — 3700000001 HC ADD 15 MINUTES (ANESTHESIA): Performed by: NEUROLOGICAL SURGERY

## 2023-01-17 PROCEDURE — 61783 SCAN PROC SPINAL: CPT | Performed by: NEUROLOGICAL SURGERY

## 2023-01-17 PROCEDURE — 6360000002 HC RX W HCPCS: Performed by: ANESTHESIOLOGY

## 2023-01-17 PROCEDURE — 3700000000 HC ANESTHESIA ATTENDED CARE: Performed by: NEUROLOGICAL SURGERY

## 2023-01-17 PROCEDURE — A4217 STERILE WATER/SALINE, 500 ML: HCPCS | Performed by: NEUROLOGICAL SURGERY

## 2023-01-17 PROCEDURE — 6360000002 HC RX W HCPCS: Performed by: NURSE ANESTHETIST, CERTIFIED REGISTERED

## 2023-01-17 PROCEDURE — 2580000003 HC RX 258: Performed by: NURSE ANESTHETIST, CERTIFIED REGISTERED

## 2023-01-17 PROCEDURE — 22614 ARTHRD PST TQ 1NTRSPC EA ADD: CPT | Performed by: NEUROLOGICAL SURGERY

## 2023-01-17 PROCEDURE — 6360000002 HC RX W HCPCS: Performed by: NEUROLOGICAL SURGERY

## 2023-01-17 PROCEDURE — 72040 X-RAY EXAM NECK SPINE 2-3 VW: CPT

## 2023-01-17 PROCEDURE — 03HY32Z INSERTION OF MONITORING DEVICE INTO UPPER ARTERY, PERCUTANEOUS APPROACH: ICD-10-PCS | Performed by: NURSE ANESTHETIST, CERTIFIED REGISTERED

## 2023-01-17 PROCEDURE — 2709999900 HC NON-CHARGEABLE SUPPLY: Performed by: NEUROLOGICAL SURGERY

## 2023-01-17 PROCEDURE — 22842 INSERT SPINE FIXATION DEVICE: CPT | Performed by: NEUROLOGICAL SURGERY

## 2023-01-17 PROCEDURE — 2580000003 HC RX 258: Performed by: ANESTHESIOLOGY

## 2023-01-17 PROCEDURE — 2700000000 HC OXYGEN THERAPY PER DAY

## 2023-01-17 PROCEDURE — 0RG4071 FUSION OF CERVICOTHORACIC VERTEBRAL JOINT WITH AUTOLOGOUS TISSUE SUBSTITUTE, POSTERIOR APPROACH, POSTERIOR COLUMN, OPEN APPROACH: ICD-10-PCS | Performed by: NEUROLOGICAL SURGERY

## 2023-01-17 PROCEDURE — 7100000000 HC PACU RECOVERY - FIRST 15 MIN: Performed by: NEUROLOGICAL SURGERY

## 2023-01-17 PROCEDURE — 2720000010 HC SURG SUPPLY STERILE: Performed by: NEUROLOGICAL SURGERY

## 2023-01-17 PROCEDURE — 72125 CT NECK SPINE W/O DYE: CPT

## 2023-01-17 PROCEDURE — 22600 ARTHRD PST TQ 1NTRSPC CRV: CPT | Performed by: NEUROLOGICAL SURGERY

## 2023-01-17 PROCEDURE — 7100000001 HC PACU RECOVERY - ADDTL 15 MIN: Performed by: NEUROLOGICAL SURGERY

## 2023-01-17 PROCEDURE — 2500000003 HC RX 250 WO HCPCS: Performed by: NEUROLOGICAL SURGERY

## 2023-01-17 PROCEDURE — 0RG2071 FUSION OF 2 OR MORE CERVICAL VERTEBRAL JOINTS WITH AUTOLOGOUS TISSUE SUBSTITUTE, POSTERIOR APPROACH, POSTERIOR COLUMN, OPEN APPROACH: ICD-10-PCS | Performed by: NEUROLOGICAL SURGERY

## 2023-01-17 PROCEDURE — 1100000000 HC RM PRIVATE

## 2023-01-17 PROCEDURE — 8E09XBZ COMPUTER ASSISTED PROCEDURE OF HEAD AND NECK REGION: ICD-10-PCS | Performed by: NEUROLOGICAL SURGERY

## 2023-01-17 PROCEDURE — 94760 N-INVAS EAR/PLS OXIMETRY 1: CPT

## 2023-01-17 PROCEDURE — 86900 BLOOD TYPING SEROLOGIC ABO: CPT

## 2023-01-17 PROCEDURE — C1713 ANCHOR/SCREW BN/BN,TIS/BN: HCPCS | Performed by: NEUROLOGICAL SURGERY

## 2023-01-17 PROCEDURE — 4A11X4G MONITORING OF PERIPHERAL NERVOUS ELECTRICAL ACTIVITY, INTRAOPERATIVE, EXTERNAL APPROACH: ICD-10-PCS | Performed by: NEUROLOGICAL SURGERY

## 2023-01-17 PROCEDURE — 6370000000 HC RX 637 (ALT 250 FOR IP): Performed by: ANESTHESIOLOGY

## 2023-01-17 DEVICE — IMPLANTABLE DEVICE: Type: IMPLANTABLE DEVICE | Site: SPINE CERVICAL | Status: FUNCTIONAL

## 2023-01-17 DEVICE — SETSCREW SPNL SQ THRD OCCIPITOCERVICOTHORACIC YUKON: Type: IMPLANTABLE DEVICE | Site: SPINE CERVICAL | Status: FUNCTIONAL

## 2023-01-17 DEVICE — BIO DBM PLUS PUTTY (WITH CANCELLOUS)
Type: IMPLANTABLE DEVICE | Site: SPINE CERVICAL | Status: FUNCTIONAL
Brand: BIO DBM

## 2023-01-17 DEVICE — SCREW SPNL L12MM DIA3.5MM OCCIPITOCERVICOTHORACIC POLYAX: Type: IMPLANTABLE DEVICE | Site: SPINE CERVICAL | Status: FUNCTIONAL

## 2023-01-17 DEVICE — GRAFT BNE LG: Type: IMPLANTABLE DEVICE | Site: SPINE CERVICAL | Status: FUNCTIONAL

## 2023-01-17 RX ORDER — GLYCOPYRROLATE 0.2 MG/ML
INJECTION INTRAMUSCULAR; INTRAVENOUS PRN
Status: DISCONTINUED | OUTPATIENT
Start: 2023-01-17 | End: 2023-01-17 | Stop reason: SDUPTHER

## 2023-01-17 RX ORDER — SODIUM CHLORIDE 0.9 % (FLUSH) 0.9 %
5-40 SYRINGE (ML) INJECTION EVERY 12 HOURS SCHEDULED
Status: DISCONTINUED | OUTPATIENT
Start: 2023-01-17 | End: 2023-01-21 | Stop reason: HOSPADM

## 2023-01-17 RX ORDER — HYDROMORPHONE HYDROCHLORIDE 1 MG/ML
0.25 INJECTION, SOLUTION INTRAMUSCULAR; INTRAVENOUS; SUBCUTANEOUS
Status: DISCONTINUED | OUTPATIENT
Start: 2023-01-17 | End: 2023-01-21 | Stop reason: HOSPADM

## 2023-01-17 RX ORDER — METOPROLOL TARTRATE 50 MG/1
100 TABLET, FILM COATED ORAL 2 TIMES DAILY
Status: DISCONTINUED | OUTPATIENT
Start: 2023-01-17 | End: 2023-01-21 | Stop reason: HOSPADM

## 2023-01-17 RX ORDER — SODIUM CHLORIDE 0.9 % (FLUSH) 0.9 %
5-40 SYRINGE (ML) INJECTION EVERY 12 HOURS SCHEDULED
Status: DISCONTINUED | OUTPATIENT
Start: 2023-01-17 | End: 2023-01-17 | Stop reason: HOSPADM

## 2023-01-17 RX ORDER — SUCCINYLCHOLINE CHLORIDE 20 MG/ML
INJECTION INTRAMUSCULAR; INTRAVENOUS PRN
Status: DISCONTINUED | OUTPATIENT
Start: 2023-01-17 | End: 2023-01-17 | Stop reason: SDUPTHER

## 2023-01-17 RX ORDER — CYCLOBENZAPRINE HCL 10 MG
10 TABLET ORAL 3 TIMES DAILY PRN
Status: DISCONTINUED | OUTPATIENT
Start: 2023-01-17 | End: 2023-01-21 | Stop reason: HOSPADM

## 2023-01-17 RX ORDER — PROPOFOL 10 MG/ML
INJECTION, EMULSION INTRAVENOUS PRN
Status: DISCONTINUED | OUTPATIENT
Start: 2023-01-17 | End: 2023-01-17 | Stop reason: SDUPTHER

## 2023-01-17 RX ORDER — HYDRALAZINE HYDROCHLORIDE 20 MG/ML
INJECTION INTRAMUSCULAR; INTRAVENOUS PRN
Status: DISCONTINUED | OUTPATIENT
Start: 2023-01-17 | End: 2023-01-17 | Stop reason: SDUPTHER

## 2023-01-17 RX ORDER — DEXAMETHASONE SODIUM PHOSPHATE 4 MG/ML
INJECTION, SOLUTION INTRA-ARTICULAR; INTRALESIONAL; INTRAMUSCULAR; INTRAVENOUS; SOFT TISSUE PRN
Status: DISCONTINUED | OUTPATIENT
Start: 2023-01-17 | End: 2023-01-17 | Stop reason: SDUPTHER

## 2023-01-17 RX ORDER — BUPIVACAINE HYDROCHLORIDE AND EPINEPHRINE 5; 5 MG/ML; UG/ML
INJECTION, SOLUTION EPIDURAL; INTRACAUDAL; PERINEURAL PRN
Status: DISCONTINUED | OUTPATIENT
Start: 2023-01-17 | End: 2023-01-17 | Stop reason: HOSPADM

## 2023-01-17 RX ORDER — SODIUM CHLORIDE, SODIUM LACTATE, POTASSIUM CHLORIDE, CALCIUM CHLORIDE 600; 310; 30; 20 MG/100ML; MG/100ML; MG/100ML; MG/100ML
INJECTION, SOLUTION INTRAVENOUS CONTINUOUS
Status: DISCONTINUED | OUTPATIENT
Start: 2023-01-17 | End: 2023-01-17

## 2023-01-17 RX ORDER — METOCLOPRAMIDE HYDROCHLORIDE 5 MG/ML
10 INJECTION INTRAMUSCULAR; INTRAVENOUS
Status: DISCONTINUED | OUTPATIENT
Start: 2023-01-17 | End: 2023-01-17 | Stop reason: HOSPADM

## 2023-01-17 RX ORDER — ACETAMINOPHEN 325 MG/1
650 TABLET ORAL EVERY 6 HOURS
Status: DISCONTINUED | OUTPATIENT
Start: 2023-01-17 | End: 2023-01-21 | Stop reason: HOSPADM

## 2023-01-17 RX ORDER — LANOLIN ALCOHOL/MO/W.PET/CERES
3 CREAM (GRAM) TOPICAL NIGHTLY PRN
Status: DISCONTINUED | OUTPATIENT
Start: 2023-01-17 | End: 2023-01-21 | Stop reason: HOSPADM

## 2023-01-17 RX ORDER — CYCLOBENZAPRINE HCL 10 MG
10 TABLET ORAL 3 TIMES DAILY
Status: DISCONTINUED | OUTPATIENT
Start: 2023-01-17 | End: 2023-01-21 | Stop reason: HOSPADM

## 2023-01-17 RX ORDER — HYDROMORPHONE HCL 110MG/55ML
0.5 PATIENT CONTROLLED ANALGESIA SYRINGE INTRAVENOUS EVERY 10 MIN PRN
Status: DISCONTINUED | OUTPATIENT
Start: 2023-01-17 | End: 2023-01-17 | Stop reason: HOSPADM

## 2023-01-17 RX ORDER — BISACODYL 10 MG
10 SUPPOSITORY, RECTAL RECTAL DAILY PRN
Status: DISCONTINUED | OUTPATIENT
Start: 2023-01-17 | End: 2023-01-21 | Stop reason: HOSPADM

## 2023-01-17 RX ORDER — LANOLIN ALCOHOL/MO/W.PET/CERES
400 CREAM (GRAM) TOPICAL DAILY
Status: DISCONTINUED | OUTPATIENT
Start: 2023-01-18 | End: 2023-01-21 | Stop reason: HOSPADM

## 2023-01-17 RX ORDER — HYDROMORPHONE HCL 110MG/55ML
PATIENT CONTROLLED ANALGESIA SYRINGE INTRAVENOUS PRN
Status: DISCONTINUED | OUTPATIENT
Start: 2023-01-17 | End: 2023-01-17 | Stop reason: SDUPTHER

## 2023-01-17 RX ORDER — MORPHINE SULFATE 15 MG/1
15 TABLET ORAL EVERY 4 HOURS PRN
Status: DISCONTINUED | OUTPATIENT
Start: 2023-01-17 | End: 2023-01-21 | Stop reason: HOSPADM

## 2023-01-17 RX ORDER — ONDANSETRON 2 MG/ML
INJECTION INTRAMUSCULAR; INTRAVENOUS PRN
Status: DISCONTINUED | OUTPATIENT
Start: 2023-01-17 | End: 2023-01-17 | Stop reason: SDUPTHER

## 2023-01-17 RX ORDER — SODIUM CHLORIDE 9 MG/ML
INJECTION, SOLUTION INTRAVENOUS PRN
Status: DISCONTINUED | OUTPATIENT
Start: 2023-01-17 | End: 2023-01-21 | Stop reason: HOSPADM

## 2023-01-17 RX ORDER — ONDANSETRON 4 MG/1
4 TABLET, ORALLY DISINTEGRATING ORAL EVERY 8 HOURS PRN
Status: DISCONTINUED | OUTPATIENT
Start: 2023-01-17 | End: 2023-01-21 | Stop reason: HOSPADM

## 2023-01-17 RX ORDER — LIDOCAINE HYDROCHLORIDE ANHYDROUS AND DEXTROSE MONOHYDRATE 5; 400 G/100ML; MG/100ML
INJECTION, SOLUTION INTRAVENOUS CONTINUOUS PRN
Status: DISCONTINUED | OUTPATIENT
Start: 2023-01-17 | End: 2023-01-17 | Stop reason: SDUPTHER

## 2023-01-17 RX ORDER — EPHEDRINE SULFATE/0.9% NACL/PF 50 MG/5 ML
SYRINGE (ML) INTRAVENOUS PRN
Status: DISCONTINUED | OUTPATIENT
Start: 2023-01-17 | End: 2023-01-17 | Stop reason: SDUPTHER

## 2023-01-17 RX ORDER — PREGABALIN 75 MG/1
75 CAPSULE ORAL ONCE
Status: COMPLETED | OUTPATIENT
Start: 2023-01-17 | End: 2023-01-17

## 2023-01-17 RX ORDER — SODIUM CHLORIDE 0.9 % (FLUSH) 0.9 %
5-40 SYRINGE (ML) INJECTION PRN
Status: DISCONTINUED | OUTPATIENT
Start: 2023-01-17 | End: 2023-01-21 | Stop reason: HOSPADM

## 2023-01-17 RX ORDER — LIDOCAINE HYDROCHLORIDE 20 MG/ML
INJECTION, SOLUTION EPIDURAL; INFILTRATION; INTRACAUDAL; PERINEURAL PRN
Status: DISCONTINUED | OUTPATIENT
Start: 2023-01-17 | End: 2023-01-17 | Stop reason: SDUPTHER

## 2023-01-17 RX ORDER — FENTANYL CITRATE 50 UG/ML
25 INJECTION, SOLUTION INTRAMUSCULAR; INTRAVENOUS EVERY 5 MIN PRN
Status: DISCONTINUED | OUTPATIENT
Start: 2023-01-17 | End: 2023-01-17 | Stop reason: HOSPADM

## 2023-01-17 RX ORDER — ONDANSETRON 2 MG/ML
4 INJECTION INTRAMUSCULAR; INTRAVENOUS EVERY 6 HOURS PRN
Status: DISCONTINUED | OUTPATIENT
Start: 2023-01-17 | End: 2023-01-21 | Stop reason: HOSPADM

## 2023-01-17 RX ORDER — MIDAZOLAM HYDROCHLORIDE 2 MG/2ML
2 INJECTION, SOLUTION INTRAMUSCULAR; INTRAVENOUS
Status: COMPLETED | OUTPATIENT
Start: 2023-01-17 | End: 2023-01-17

## 2023-01-17 RX ORDER — OXYCODONE HYDROCHLORIDE 5 MG/1
5 TABLET ORAL
Status: DISCONTINUED | OUTPATIENT
Start: 2023-01-17 | End: 2023-01-17 | Stop reason: HOSPADM

## 2023-01-17 RX ORDER — OXYCODONE HYDROCHLORIDE 5 MG/1
5 TABLET ORAL EVERY 4 HOURS PRN
Status: DISCONTINUED | OUTPATIENT
Start: 2023-01-17 | End: 2023-01-21 | Stop reason: HOSPADM

## 2023-01-17 RX ORDER — VANCOMYCIN HYDROCHLORIDE 1 G/20ML
INJECTION, POWDER, LYOPHILIZED, FOR SOLUTION INTRAVENOUS PRN
Status: DISCONTINUED | OUTPATIENT
Start: 2023-01-17 | End: 2023-01-17 | Stop reason: HOSPADM

## 2023-01-17 RX ORDER — SODIUM CHLORIDE, SODIUM LACTATE, POTASSIUM CHLORIDE, CALCIUM CHLORIDE 600; 310; 30; 20 MG/100ML; MG/100ML; MG/100ML; MG/100ML
INJECTION, SOLUTION INTRAVENOUS CONTINUOUS
Status: DISCONTINUED | OUTPATIENT
Start: 2023-01-17 | End: 2023-01-17 | Stop reason: HOSPADM

## 2023-01-17 RX ORDER — ACETAMINOPHEN 500 MG
1000 TABLET ORAL ONCE
Status: DISCONTINUED | OUTPATIENT
Start: 2023-01-17 | End: 2023-01-17 | Stop reason: HOSPADM

## 2023-01-17 RX ORDER — SODIUM CHLORIDE 0.9 % (FLUSH) 0.9 %
5-40 SYRINGE (ML) INJECTION PRN
Status: DISCONTINUED | OUTPATIENT
Start: 2023-01-17 | End: 2023-01-17 | Stop reason: HOSPADM

## 2023-01-17 RX ORDER — SCOLOPAMINE TRANSDERMAL SYSTEM 1 MG/1
1 PATCH, EXTENDED RELEASE TRANSDERMAL
Status: DISCONTINUED | OUTPATIENT
Start: 2023-01-17 | End: 2023-01-17

## 2023-01-17 RX ORDER — ROCURONIUM BROMIDE 10 MG/ML
INJECTION, SOLUTION INTRAVENOUS PRN
Status: DISCONTINUED | OUTPATIENT
Start: 2023-01-17 | End: 2023-01-17 | Stop reason: SDUPTHER

## 2023-01-17 RX ORDER — CYCLOBENZAPRINE HCL 10 MG
10 TABLET ORAL 3 TIMES DAILY
Status: DISCONTINUED | OUTPATIENT
Start: 2023-01-17 | End: 2023-01-17

## 2023-01-17 RX ORDER — OXYCODONE HYDROCHLORIDE 5 MG/1
10 TABLET ORAL EVERY 4 HOURS PRN
Status: DISCONTINUED | OUTPATIENT
Start: 2023-01-17 | End: 2023-01-21 | Stop reason: HOSPADM

## 2023-01-17 RX ORDER — SODIUM PHOSPHATE, DIBASIC AND SODIUM PHOSPHATE, MONOBASIC 7; 19 G/133ML; G/133ML
1 ENEMA RECTAL DAILY PRN
Status: DISCONTINUED | OUTPATIENT
Start: 2023-01-17 | End: 2023-01-21 | Stop reason: HOSPADM

## 2023-01-17 RX ORDER — DEXMEDETOMIDINE HYDROCHLORIDE 4 UG/ML
INJECTION, SOLUTION INTRAVENOUS CONTINUOUS PRN
Status: DISCONTINUED | OUTPATIENT
Start: 2023-01-17 | End: 2023-01-17 | Stop reason: SDUPTHER

## 2023-01-17 RX ORDER — NEOSTIGMINE METHYLSULFATE 1 MG/ML
INJECTION, SOLUTION INTRAVENOUS PRN
Status: DISCONTINUED | OUTPATIENT
Start: 2023-01-17 | End: 2023-01-17 | Stop reason: SDUPTHER

## 2023-01-17 RX ORDER — POLYETHYLENE GLYCOL 3350 17 G/17G
17 POWDER, FOR SOLUTION ORAL DAILY
Status: DISCONTINUED | OUTPATIENT
Start: 2023-01-18 | End: 2023-01-21 | Stop reason: HOSPADM

## 2023-01-17 RX ORDER — SODIUM CHLORIDE 9 MG/ML
INJECTION, SOLUTION INTRAVENOUS CONTINUOUS
Status: DISCONTINUED | OUTPATIENT
Start: 2023-01-17 | End: 2023-01-21 | Stop reason: HOSPADM

## 2023-01-17 RX ORDER — DIPHENHYDRAMINE HYDROCHLORIDE 50 MG/ML
12.5 INJECTION INTRAMUSCULAR; INTRAVENOUS
Status: DISCONTINUED | OUTPATIENT
Start: 2023-01-17 | End: 2023-01-17 | Stop reason: HOSPADM

## 2023-01-17 RX ORDER — ONDANSETRON 2 MG/ML
4 INJECTION INTRAMUSCULAR; INTRAVENOUS
Status: DISCONTINUED | OUTPATIENT
Start: 2023-01-17 | End: 2023-01-17 | Stop reason: HOSPADM

## 2023-01-17 RX ORDER — SODIUM CHLORIDE 9 MG/ML
INJECTION, SOLUTION INTRAVENOUS PRN
Status: DISCONTINUED | OUTPATIENT
Start: 2023-01-17 | End: 2023-01-17 | Stop reason: HOSPADM

## 2023-01-17 RX ORDER — HYDROMORPHONE HYDROCHLORIDE 1 MG/ML
0.5 INJECTION, SOLUTION INTRAMUSCULAR; INTRAVENOUS; SUBCUTANEOUS
Status: DISCONTINUED | OUTPATIENT
Start: 2023-01-17 | End: 2023-01-21 | Stop reason: HOSPADM

## 2023-01-17 RX ORDER — KETAMINE HYDROCHLORIDE 50 MG/ML
INJECTION, SOLUTION, CONCENTRATE INTRAMUSCULAR; INTRAVENOUS PRN
Status: DISCONTINUED | OUTPATIENT
Start: 2023-01-17 | End: 2023-01-17 | Stop reason: SDUPTHER

## 2023-01-17 RX ORDER — SENNA AND DOCUSATE SODIUM 50; 8.6 MG/1; MG/1
1 TABLET, FILM COATED ORAL 2 TIMES DAILY
Status: DISCONTINUED | OUTPATIENT
Start: 2023-01-17 | End: 2023-01-21 | Stop reason: HOSPADM

## 2023-01-17 RX ORDER — FENTANYL CITRATE 50 UG/ML
100 INJECTION, SOLUTION INTRAMUSCULAR; INTRAVENOUS
Status: DISCONTINUED | OUTPATIENT
Start: 2023-01-17 | End: 2023-01-17 | Stop reason: HOSPADM

## 2023-01-17 RX ADMIN — CEFEPIME HYDROCHLORIDE 2000 MG: 2 INJECTION, POWDER, FOR SOLUTION INTRAVENOUS at 09:15

## 2023-01-17 RX ADMIN — LIDOCAINE HYDROCHLORIDE 2 MG/MIN: 4 INJECTION, SOLUTION INTRAVENOUS at 08:45

## 2023-01-17 RX ADMIN — KETAMINE HYDROCHLORIDE 16 MG: 50 INJECTION, SOLUTION INTRAMUSCULAR; INTRAVENOUS at 13:40

## 2023-01-17 RX ADMIN — DEXAMETHASONE SODIUM PHOSPHATE 10 MG: 4 INJECTION, SOLUTION INTRAMUSCULAR; INTRAVENOUS at 09:15

## 2023-01-17 RX ADMIN — VANCOMYCIN HYDROCHLORIDE 1000 MG: 1 INJECTION, POWDER, LYOPHILIZED, FOR SOLUTION INTRAVENOUS at 07:27

## 2023-01-17 RX ADMIN — KETAMINE HYDROCHLORIDE 16 MG: 50 INJECTION, SOLUTION INTRAMUSCULAR; INTRAVENOUS at 09:45

## 2023-01-17 RX ADMIN — Medication 3 MG: at 14:10

## 2023-01-17 RX ADMIN — KETAMINE HYDROCHLORIDE 16 MG: 50 INJECTION, SOLUTION INTRAMUSCULAR; INTRAVENOUS at 10:45

## 2023-01-17 RX ADMIN — HYDROMORPHONE HYDROCHLORIDE 1.5 MG: 2 INJECTION INTRAMUSCULAR; INTRAVENOUS; SUBCUTANEOUS at 08:55

## 2023-01-17 RX ADMIN — PROPOFOL 100 MG: 10 INJECTION, EMULSION INTRAVENOUS at 10:10

## 2023-01-17 RX ADMIN — Medication 3 AMPULE: at 07:31

## 2023-01-17 RX ADMIN — KETAMINE HYDROCHLORIDE 16 MG: 50 INJECTION, SOLUTION INTRAMUSCULAR; INTRAVENOUS at 12:45

## 2023-01-17 RX ADMIN — PROPOFOL 100 MG: 10 INJECTION, EMULSION INTRAVENOUS at 09:10

## 2023-01-17 RX ADMIN — SENNOSIDES AND DOCUSATE SODIUM 1 TABLET: 8.6; 5 TABLET ORAL at 20:38

## 2023-01-17 RX ADMIN — PHENYLEPHRINE HYDROCHLORIDE 200 MCG: 10 INJECTION INTRAVENOUS at 14:08

## 2023-01-17 RX ADMIN — SODIUM CHLORIDE, PRESERVATIVE FREE 5 ML: 5 INJECTION INTRAVENOUS at 20:38

## 2023-01-17 RX ADMIN — SODIUM CHLORIDE, SODIUM LACTATE, POTASSIUM CHLORIDE, AND CALCIUM CHLORIDE: 600; 310; 30; 20 INJECTION, SOLUTION INTRAVENOUS at 18:53

## 2023-01-17 RX ADMIN — CEFEPIME 2000 MG: 2 INJECTION, POWDER, FOR SOLUTION INTRAVENOUS at 20:38

## 2023-01-17 RX ADMIN — HYDRALAZINE HYDROCHLORIDE 5 MG: 20 INJECTION INTRAMUSCULAR; INTRAVENOUS at 11:25

## 2023-01-17 RX ADMIN — SODIUM CHLORIDE, POTASSIUM CHLORIDE, SODIUM LACTATE AND CALCIUM CHLORIDE: 600; 310; 30; 20 INJECTION, SOLUTION INTRAVENOUS at 07:24

## 2023-01-17 RX ADMIN — Medication 140 MG: at 08:31

## 2023-01-17 RX ADMIN — OXYCODONE HYDROCHLORIDE 10 MG: 5 TABLET ORAL at 18:48

## 2023-01-17 RX ADMIN — KETAMINE HYDROCHLORIDE 10 MG: 50 INJECTION, SOLUTION INTRAMUSCULAR; INTRAVENOUS at 08:35

## 2023-01-17 RX ADMIN — ONDANSETRON 4 MG: 2 INJECTION INTRAMUSCULAR; INTRAVENOUS at 14:05

## 2023-01-17 RX ADMIN — GLYCOPYRROLATE 0.2 MG: 0.2 INJECTION, SOLUTION INTRAMUSCULAR; INTRAVENOUS at 14:10

## 2023-01-17 RX ADMIN — CYCLOBENZAPRINE 10 MG: 10 TABLET, FILM COATED ORAL at 20:38

## 2023-01-17 RX ADMIN — ONDANSETRON 4 MG: 2 INJECTION INTRAMUSCULAR; INTRAVENOUS at 09:15

## 2023-01-17 RX ADMIN — PROPOFOL 40 MG: 10 INJECTION, EMULSION INTRAVENOUS at 08:56

## 2023-01-17 RX ADMIN — ACETAMINOPHEN 650 MG: 325 TABLET ORAL at 20:37

## 2023-01-17 RX ADMIN — MIDAZOLAM 2 MG: 1 INJECTION INTRAMUSCULAR; INTRAVENOUS at 07:32

## 2023-01-17 RX ADMIN — OXYCODONE HYDROCHLORIDE 10 MG: 5 TABLET ORAL at 23:35

## 2023-01-17 RX ADMIN — PREGABALIN 75 MG: 75 CAPSULE ORAL at 07:25

## 2023-01-17 RX ADMIN — METOPROLOL TARTRATE 100 MG: 50 TABLET, FILM COATED ORAL at 20:38

## 2023-01-17 RX ADMIN — Medication 5 MG: at 08:40

## 2023-01-17 RX ADMIN — DEXMEDETOMIDINE HYDROCHLORIDE 0.8 MCG/KG/HR: 4 INJECTION, SOLUTION INTRAVENOUS at 08:45

## 2023-01-17 RX ADMIN — Medication 20 MG: at 08:35

## 2023-01-17 RX ADMIN — HYDROMORPHONE HYDROCHLORIDE 0.5 MG: 2 INJECTION INTRAMUSCULAR; INTRAVENOUS; SUBCUTANEOUS at 08:31

## 2023-01-17 RX ADMIN — KETAMINE HYDROCHLORIDE 16 MG: 50 INJECTION, SOLUTION INTRAMUSCULAR; INTRAVENOUS at 11:45

## 2023-01-17 RX ADMIN — LIDOCAINE HYDROCHLORIDE 100 MG: 20 INJECTION, SOLUTION EPIDURAL; INFILTRATION; INTRACAUDAL; PERINEURAL at 08:31

## 2023-01-17 RX ADMIN — PHENYLEPHRINE HYDROCHLORIDE 200 MCG: 10 INJECTION INTRAVENOUS at 14:04

## 2023-01-17 RX ADMIN — KETAMINE HYDROCHLORIDE 10 MG: 50 INJECTION, SOLUTION INTRAMUSCULAR; INTRAVENOUS at 08:31

## 2023-01-17 RX ADMIN — PROPOFOL 200 MG: 10 INJECTION, EMULSION INTRAVENOUS at 08:31

## 2023-01-17 RX ADMIN — PROPOFOL 250 MCG/KG/MIN: 10 INJECTION, EMULSION INTRAVENOUS at 08:35

## 2023-01-17 RX ADMIN — PHENYLEPHRINE HYDROCHLORIDE 100 MCG: 10 INJECTION INTRAVENOUS at 13:59

## 2023-01-17 RX ADMIN — ROCURONIUM BROMIDE 50 MG: 50 INJECTION, SOLUTION INTRAVENOUS at 10:06

## 2023-01-17 RX ADMIN — PHENYLEPHRINE HYDROCHLORIDE 100 MCG: 10 INJECTION INTRAVENOUS at 13:46

## 2023-01-17 ASSESSMENT — PAIN - FUNCTIONAL ASSESSMENT: PAIN_FUNCTIONAL_ASSESSMENT: 0-10

## 2023-01-17 ASSESSMENT — PAIN SCALES - GENERAL
PAINLEVEL_OUTOF10: 5
PAINLEVEL_OUTOF10: 0

## 2023-01-17 ASSESSMENT — PAIN DESCRIPTION - ORIENTATION: ORIENTATION: POSTERIOR

## 2023-01-17 ASSESSMENT — PAIN DESCRIPTION - LOCATION: LOCATION: NECK

## 2023-01-17 NOTE — H&P
[facility] CLINIC NOTE:   History of Present Illness:     CC: Follow-up evaluation of pain in the neck and numbness and pain radiating to his pinky and ring finger in the left greater than right upper extremity, chronic low back pain with numbness and tingling in his lower extremities as well as fatigue and walking     Arby Levels is a 74 y.o. male who presented to clinic for follow-up evaluation of pain in the neck and numbness and pain radiating to his pinky and ring finger in the left greater than right upper extremity, chronic low back pain with numbness and tingling in his lower extremities as well as fatigue and walking. The patient states that he has worked with physical therapy multiple times throughout the years and is doing physician directed home exercise program over the last 12 months. He endorses pain radiating from his neck between the shoulder blades and into his left arm to the lateral ring and pinky finger. He is here for follow-up and rates his pain as a 4 out of 10 on a visual analog pain scale. He also endorses low back pain. He has tried celecoxib with respect to his low back. Patient states it is hard to hold onto objects secondary to his osteoarthritis and numbness and tingling in his fingers. The patient has an MRI lumbar spine performed on 11/18/2022 that demonstrates advanced degenerative disc disease affecting the lumbar spine at every level significantly at L2-L3 L3-L4 and L4-L5 and L5-S1 where there is complete disc height collapse and Modic endplate changes. At L2-L3 there is some mild facet arthropathy at L3-L4 there is advanced facet hypertrophy with thickening of the ligamentum flavum that results in moderate to severe central spinal stenosis.   At L3-L4 there is moderate to severe central lateral recess spinal stenosis secondary to facet arthropathy hypertrophy ligamentum flavum hypertrophy and degenerative disc disease at L5-S1 there is advanced facet hypertrophy and some mild to moderate lateral recess stenosis without significant central spinal stenosis. Patient has a mild degenerative scoliotic deformity with some lateral listhesis of L4 upon L5 to the left. At L3-L4 there is moderate left and moderate to severe right neuroforaminal stenosis. At L4-L5 there is moderate right and mild to moderate left neuroforaminal stenosis. At L5-S1 there is mild to moderate bilateral neuroforaminal stenosis. In comparison to an MRI performed on 10/30/2021 there has been some mild interval progression of his lumbar spondylosis. The patient has an MRI cervical spine without contrast performed at [address] [place] [address] on 11/18/2022 that demonstrates cervical spondylosis at multiple levels with mild spinal stenosis appreciable at C3-C4 moderate to severe right neuroforaminal stenosis at C4-C5 there is mild spinal stenosis at C5-C6 with mild spinal stenosis there is advanced disc height loss at C7-T1 with mild anterolisthesis and a chronic appearing compression deformity at C7 and C7-T1. There is no definitive intrinsic cord signal change. This does not appear significantly changed from an MRI compared to that was performed on 10/30/2021. Past Medical History        Past Medical History:   Diagnosis Date    Atrial fibrillation ([address]       Dx in          Past Surgical History         Past Surgical History:   Procedure Laterality Date    HERNIA REPAIR Right           No Known Allergies   Family History   No family history on file.       Social History               Socioeconomic History    Marital status: Single       Spouse name: Not on file    Number of children: Not on file    Years of education: Not on file    Highest education level: Not on file   Occupational History    Not on file   Tobacco Use    Smoking status: Former       Packs/day: 0.50       Types: Cigarettes    Smokeless tobacco: Never    Tobacco comments:       Quit smoking: quit about 15 years ago   Substance and Sexual Activity    Alcohol use: Never    Drug use: Not on file    Sexual activity: Not on file   Other Topics Concern    Not on file   Social History Narrative    Not on file      Social Determinants of Health      Financial Resource Strain: Not on file   Food Insecurity: Not on file   Transportation Needs: Not on file   Physical Activity: Not on file   Stress: Not on file   Social Connections: Not on file   Intimate Partner Violence: Not on file   Housing Stability: Not on file         Current Facility-Administered Medications          Current Outpatient Medications   Medication Sig Dispense Refill    Celecoxib (CELEBREX PO) Take by mouth        morphine (MSIR) 15 MG tablet Take 15 mg by mouth every 4 hours as needed for Pain. BID        clotrimazole (LOTRIMIN) 1 % cream Apply topically 2 times daily Apply topically 2 times daily. CALCIUM PO Take by mouth        UNABLE TO FIND Collagen peptides        UNABLE TO FIND BCAA        CREATINE MONOHYDRATE PO Take by mouth        Whey Protein POWD Take by mouth        magnesium oxide (MAG-OX) 400 MG tablet Take 400 mg by mouth daily        Multiple Vitamin (MULTIVITAMIN ADULT PO) Take by mouth        Zinc Acetate, Oral, (ZINC ACETATE PO) Take 22 mg by mouth        acetaminophen (TYLENOL) 500 MG tablet Take 500 mg by mouth 2 times daily        diclofenac sodium (VOLTAREN) 1 % GEL Apply 2 g topically 4 times daily as needed        melatonin 3 MG TABS tablet Take by mouth        urea (CARMOL) 20 % cream Apply topically daily          No current facility-administered medications for this visit.              Patient Active Problem List   Diagnosis    Hyperlipidemia    Chest pain    PEREZ (dyspnea on exertion)    Persistent atrial fibrillation (HCC)    Cardiomyopathy (HCC)    Paroxysmal atrial fibrillation (HCC)         Review of Systems     Physical Exam:  BP (!) 151/75   Pulse 50   Temp 97.3 °F (36.3 °C) (Oral)   Resp 16   Ht 6' (1.829 m)   Wt 156 lb 3.2 oz (70.9 kg)   SpO2 98%   BMI 21.18 kg/m²    Pain Score:   4  Head normocephalic and atraumatic  Mood and affect appropriate  General: No acute distress  CV: Regular rate  Resp: No increased work of breathing  Skin: warm and dry  Awake, alert, and oriented   Speech Fluent  Eyes open spontaneously   Hearing grossly intact  Face symmetric and tongue midline on protrusion  Sternocleidomastoid and trapezius strength 5/5  Patient with strength exam as follows:   Upper Extremities:      Right    Left                             Deltoid             5          5                          Biceps             5          5                          Triceps                        5          5                                           5          5                 Lower Extremities:                             Hip Flex           5          5                          Quads              5          5                          Hamstrings      5          5                          Dorsiflex          5          5                          Plantarflex       5          5                          EHL                 5          5  No evidence of thenar atrophy  Sensation grossly intact  DTR absent right patellar response 2+ left patellar response  No clonus or babinski present   No Paiz's sign present bilaterally   Negative straight leg raise test on the right and left  Gait: Ambulates with a slightly hunched forward posture at the waist     Assessment & Plan:  Mackenzie Barbosa is a 74 y.o. male who presented to clinic for follow-up valuation of evaluation of  pain in the neck and numbness and pain radiating to his pinky and ring finger in the left greater than right upper extremity, chronic low back pain with numbness and tingling in his lower extremities as well as fatigue and walking.    I have independently reviewed and interpreted the patient's MRI cervical spine without contrast performed at [address] [place] [address] [facility] on 11/18/2022 that demonstrates cervical spondylosis at multiple levels with mild spinal stenosis appreciable at C3-C4 moderate to severe right neuroforaminal stenosis at C4-C5 there is mild spinal stenosis at C5-C6 with mild spinal stenosis there is advanced disc height loss at C7-T1 with mild anterolisthesis and a chronic appearing compression deformity at C7 and C7-T1. There is no definitive intrinsic cord signal change. This does not appear significantly changed from an MRI compared to that was performed on 10/30/2021. I have independently reviewed and interpreted the patient's MRI lumbar spine performed on 11/18/2022 that demonstrates advanced degenerative disc disease affecting the lumbar spine at every level significantly at L2-L3 L3-L4 and L4-L5 and L5-S1 where there is complete disc height collapse and Modic endplate changes. At L2-L3 there is some mild facet arthropathy at L3-L4 there is advanced facet hypertrophy with thickening of the ligamentum flavum that results in moderate to severe central spinal stenosis. At L3-L4 there is moderate to severe central lateral recess spinal stenosis secondary to facet arthropathy hypertrophy ligamentum flavum hypertrophy and degenerative disc disease at L5-S1 there is advanced facet hypertrophy and some mild to moderate lateral recess stenosis without significant central spinal stenosis. Patient has a mild degenerative scoliotic deformity with some lateral listhesis of L4 upon L5 to the left. At L3-L4 there is moderate left and moderate to severe right neuroforaminal stenosis. At L4-L5 there is moderate right and mild to moderate left neuroforaminal stenosis. At L5-S1 there is mild to moderate bilateral neuroforaminal stenosis. In comparison to an MRI performed on 10/30/2021 there has been some mild interval progression of his lumbar spondylosis.   At this time the patient is most symptomatic with respect to his chronic neck pain as well as the numbness and tingling radiating into the lateral pinky and ring finger that is now also affecting the right side. Seems most consistent with a C8 radiculopathy which is likely secondary to the anterior listhesis and neuroforaminal stenosis at C7-T1. I believe that in order to 6 the patient's current cervical spinal stenosis that is multilevel as well as his neuroforaminal stenosis at multiple levels and treat his chronic neck pain that he would most likely benefit from a C3-T1 posterior cervical instrumented fixation with lateral mass arthrodesis and decompression from C3-T1 including foraminotomies at C7-T1. Regarding his low back and signs of lumbar spinal stenosis with neurogenic claudication he has advanced complete disc height collapse at multiple levels I believe that a lumbar spinal fusion deformity correction will be aggressive at this point and that he may just benefit at his age with his degree of osteoarthritis from an L3-L4 and L4-L5 midline lumbar laminectomy bilateral medial facetectomies bilaterally for decompression. He agrees to proceed with  C3-T1 posterior cervical instrumented fixation with lateral mass arthrodesis and decompression from C3-T1. Agustin Miranda.  Vania Nava, [address] [place] [address]

## 2023-01-17 NOTE — PERIOP NOTE
called regarding tremors to bilat hands. Dr. Stefania Collins is aware nd no orders received. Full sensation.  Denies pain

## 2023-01-17 NOTE — ANESTHESIA PROCEDURE NOTES
Airway  Date/Time: 1/17/2023 9:15 AM  Urgency: elective    Airway not difficult    General Information and Staff    Patient location during procedure: OR  Performed: resident/CRNA     Indications and Patient Condition  Indications for airway management: anesthesia  Spontaneous Ventilation: absent  Sedation level: deep  Preoxygenated: yes  Patient position: sniffing  MILS not maintained throughout  Mask difficulty assessment: vent by bag mask    Final Airway Details  Final airway type: endotracheal airway      Successful airway: ETT  Cuffed: yes   Successful intubation technique: video laryngoscopy  Facilitating devices/methods: intubating stylet  Endotracheal tube insertion site: oral  Blade size: #3  ETT size (mm): 7.0  Cormack-Lehane Classification: grade IIa - partial view of glottis  Placement verified by: chest auscultation and capnometry   Measured from: lips  Number of attempts at approach: 1  Ventilation between attempts: bag mask

## 2023-01-17 NOTE — PERIOP NOTE
Patient has spoken with АЛЕКСАНДР JEFFRIES. Consent has been verified, signed and witnessed by this RN. 2 mg of Versed given SIVP per orders. Pulse ox monitor in place & tracing appropriately. Bed in low, locked position with side rails up x 2 and call light in reach. Instructed pt to call with any needs and to remain in bed. Verbalizes understanding. Daughter at bedside.

## 2023-01-17 NOTE — PERIOP NOTE
TRANSFER - OUT REPORT:    Verbal report given to Michael Gerard RN on [employer].  being transferred to [address] [place], [place] [address] [facility] for routine progression of patient care       Report consisted of patients Situation, Background, Assessment and   Recommendations(SBAR). Information from the following report(s) Nurse Handoff Report, Adult Overview, Surgery Report, and Cardiac Rhythm SR  was reviewed with the receiving nurse. Lines:   Peripheral IV 01/17/23 Right;Posterior Hand (Active)       Peripheral IV 01/17/23 Right Wrist (Active)        Opportunity for questions and clarification was provided. Patient transported with:   O2 @ 3 liters  Tech    VTE prophylaxis orders have been written for [employer]. .    Patient and family given floor number and nurses name. Family updated re: pt status after security code verified.

## 2023-01-17 NOTE — OP NOTE
NEUROSURGERY OPERATIVE REPORT     Patient Name:Aubrey Paez. Date of Operation: 1/17/2023    Patient MRN: [id number]    Patient YOB: 1955     Pre-Operative Diagnosis:   1. Cervical Spinal stenosis   2. Cervical Neuroforaminal stenosis   3. C7-T1 Spondylolisthesis   4. DDD-Cervical Spine  5. Neck pain       Post-Operative Diagnosis: SAME AS ABOVE      Procedure:   1. C3-T1 posterior cervicothoracic instrumented fixation (K2-Yukon and DBM)   2. C3-C7 Laminectomies for decompression   3. Bilateral C3-T1 arthrodesis and posteroloateral fusion with morselized autograft and supplemented with DBM  4. Continuous multimodality neurophysiological intraoperative neuromonitoring with onsite tech and remote physician oversite  5. Intraoperative fluoroscopy   6. Intraoperative Stereotactic Neuronavigation with Dacuda 3D Spine Map and XmyboxD 3-D C-Arm      Surgeon: Khang Boggs. Morgan Bunn MD    Anesthesiologist: Velasquez Summers MD     Surgical Assistant: Antonia Deleon NP      Surgical Staff:   See Operative Record     Anesthesia : General Endotracheal anesthesia     Estimated Blood Loss: 100 cc     Specimens: None     Indication for Procedure:   Tulio Troncoso is a 74 y.o. male who presented to clinic for follow-up valuation of evaluation of  pain in the neck and numbness and pain radiating to his pinky and ring finger in the left greater than right upper extremity, chronic low back pain with numbness and tingling in his lower extremities as well as fatigue and walking.    I have independently reviewed and interpreted the patient's MRI cervical spine without contrast performed at [facility] on 11/18/2022 that demonstrates cervical spondylosis at multiple levels with mild spinal stenosis appreciable at C3-C4 moderate to severe right neuroforaminal stenosis at C4-C5 there is mild spinal stenosis at C5-C6 with mild spinal stenosis there is advanced disc height loss at C7-T1 with mild anterolisthesis and a chronic appearing compression deformity at C7 and C7-T1. There is no definitive intrinsic cord signal change. This does not appear significantly changed from an MRI compared to that was performed on 10/30/2021. I have independently reviewed and interpreted the patient's MRI lumbar spine performed on 11/18/2022 that demonstrates advanced degenerative disc disease affecting the lumbar spine at every level significantly at L2-L3 L3-L4 and L4-L5 and L5-S1 where there is complete disc height collapse and Modic endplate changes. At L2-L3 there is some mild facet arthropathy at L3-L4 there is advanced facet hypertrophy with thickening of the ligamentum flavum that results in moderate to severe central spinal stenosis. At L3-L4 there is moderate to severe central lateral recess spinal stenosis secondary to facet arthropathy hypertrophy ligamentum flavum hypertrophy and degenerative disc disease at L5-S1 there is advanced facet hypertrophy and some mild to moderate lateral recess stenosis without significant central spinal stenosis. Patient has a mild degenerative scoliotic deformity with some lateral listhesis of L4 upon L5 to the left. At L3-L4 there is moderate left and moderate to severe right neuroforaminal stenosis. At L4-L5 there is moderate right and mild to moderate left neuroforaminal stenosis. At L5-S1 there is mild to moderate bilateral neuroforaminal stenosis. In comparison to an MRI performed on 10/30/2021 there has been some mild interval progression of his lumbar spondylosis. At this time the patient is most symptomatic with respect to his chronic neck pain as well as the numbness and tingling radiating into the lateral pinky and ring finger that is now also affecting the right side. Seems most consistent with a C8 radiculopathy which is likely secondary to the anterior listhesis and neuroforaminal stenosis at C7-T1.   I believe that in order to 6 the patient's current cervical spinal stenosis that is multilevel as well as his neuroforaminal stenosis at multiple levels and treat his chronic neck pain that he would most likely benefit from a C3-T1 posterior cervical instrumented fixation with lateral mass arthrodesis and decompression from C3-T1 including foraminotomies at C7-T1. Regarding his low back and signs of lumbar spinal stenosis with neurogenic claudication he has advanced complete disc height collapse at multiple levels I believe that a lumbar spinal fusion deformity correction will be aggressive at this point and that he may just benefit at his age with his degree of osteoarthritis from an L3-L4 and L4-L5 midline lumbar laminectomy bilateral medial facetectomies bilaterally for decompression. He agrees to proceed with  C3-T1 posterior cervical instrumented fixation with lateral mass arthrodesis and decompression from C3-T1. Procedure in Detail:  Exposure, Navigation Acquisition and Instrumented Fixation   The patient was was transported to the OR and placed under general anesthesia. The patient was placed under continuous intraoperative neurophysiological monitoring. Pre-positioning neuromonitoring signals were obtained in the supine position with SSEPs, MEPs and EMG. the patient was placed in the Castaneda head pins then he was very carefully positioned in the prone position, I maintained controlled the head and Castaneda head fixation device throughout the positioning. A surgical pause time-out was performed at the beginning of the case prior to incision confirming procedure, sterility and functionality of instruments, surgical site, stability of patient, and anti-biotic administration. A linear midline posterior cervical incision was made. Subperiosteal dissection exposed the spinous process and the lamina from C3-T1. Intraoperative C-arm fluoroscopic imaging was used to identify the appropriate level.   At this point I extended the incision inferiorly to expose the midline spinous process of T3 and he had a too small incisions on either side of the fascia of the T3 spinous process and a 60 [place] 3D spine map reference array to the spinous process of T3. At this point we then proceeded [facility] this was accurately performed and registered to the patient and confirmed with landmarks. Was then able to use navigation to plan and place my T1 pedicle screws. I used the navigated drill guide to drill to a depth of 20 and then used a navigated cortical bone tap to confirm cortical tap to a depth of 26. I then placed bilateral 4.0 mm x 26 mm screws in the T1 pedicles bilaterally proper positioning was confirmed with navigation. Also prior to placing the pedicle screws the pedicle trajectory was probed with a stiff ball-tipped feeler/sounder and there was no concern for medial lateral cephalad or caudal breach. At this point I was planning on navigating my lateral mass screws however the system would not cross back over from the C-arm 3D spin with the preoperative planning [place] Navigation CT. At this point I decided to bring back and the C arm for lateral and AP fluoroscopy for the placement of the lateral mass screws from C3-C6. Next, the facet joints from C3-C7 were decorticated. A central  hole was drilled in the center of each lateral mass from C3-C6 to a depth of 12-mm, angled 30 degrees superior and lateral from the center portion of each facet. Next, a 12-mm K2-Yukon polyaxial screw was then placed in each lateral mass within the same trajectory. A vertical tristin was placed through the screw heads. Locking nuts were placed and each tightened with a counter torque wrench to 12 Hernandez meters. The wound was copiously irrigated. C3-C7 Laminectomy, forminatomy for decompression. The spinal process and lamina of C3 was removed with [place] rongeurs and the [place] drill.   At C5 and C6 the spinous process and lamina were removed by drilling a trough in the lateral gutter through the lamina and mobilizing the spinous process and lamina in a single piece. A 1 mm [place] rongeur was used to remove the lamina by detaching the soft tissue attachments of the ligamentum flavum and the inter-spinous ligaments. The C7 lamina was removed in it's entirety with high speed drill, [place] and [place] rongeurs. The wound was copiously irrigated. Hemostasis was obtained. C3-T1 POSTEROLATERAL ARTHRODESIS WITH MORSELIZED AUTOGRAFT, ALLOGRAFT AND DBX:  Next, the morselized autograft harvested from the laminectomy was placed over the decorticated facets and over the decorticated lateral masses. This was augmented with DBM. Next, the soft tissue retractors were removed. A medium hemovac drain was brought through a separate stab wound from below the deep fascia. The deep fascia was closed over the hemovac drain with interrupted 0 Vicryl after hemostasis was obtained. The subcutaneous tissue was reapproximated with 0 and 2-0 interrupted Vicryl. The skin was closed with staples. The wound was sterilely bandaged. The patient was then removed from the Castaneda head pins, turned carefully to supine position, allowed to recover from his general endotracheal anesthesia. All surgical counts were correct at the end of the case. The patient tolerated the procedure without any complications     CONTINUOUS INTRAOPERATIVE NEUROMONITORING:   Multi-modality intraoperative neuromonitoring was performed with an onsite technician and remote physician in an effort to safeguard the patient. All IOM studies were performed under real time physician supervision via internet communication allowing continuous or immediate contact between the interpreting physician and myself.      Intraoperative neuromonitoring included the following modalities: SSEPs, MEPs, and [place] EMG    No significant changes; no sustained EMG;  Until the very last MEP that demonstrated a decrease in amplitude of the left MEP signals which could not be explained by intraoperative events . Rashmi Whittington.  Mane Edwards, [address] [facility]

## 2023-01-17 NOTE — ANESTHESIA PROCEDURE NOTES
Arterial Line:    An arterial line was placed using surface landmarks, in the OR for the following indication(s): . A  (size), 1 and 3/4 inch (length), Arrow (type) catheter was placed, Seldinger technique used, into the left radial artery, secured by Tegaderm and tape. Anesthesia type: General    Events:  patient tolerated procedure well with no complications and EBL < 5mL. 1/17/2023 8:45 AM  Resident/CRNA: IGOR Bettencourt - CRNA  Preanesthetic Checklist  Completed: patient identified, IV checked, site marked, risks and benefits discussed, surgical/procedural consents, equipment checked, pre-op evaluation, timeout performed, anesthesia consent given, oxygen available, monitors applied/VS acknowledged, fire risk safety assessment completed and verbalized and blood product R/B/A discussed and consented

## 2023-01-17 NOTE — PROGRESS NOTES
TRANSFER - IN REPORT:    Verbal report received from MARYCARMEN Zarate on Vidal Adkins.  being received from PACU for routine progression of patient care      Report consisted of patient's Situation, Background, Assessment and   Recommendations(SBAR). Information from the following report(s) Nurse Handoff Report was reviewed with the receiving nurse. Opportunity for questions and clarification was provided. Assessment completed upon patient's arrival to unit and care assumed.

## 2023-01-17 NOTE — ANESTHESIA POSTPROCEDURE EVALUATION
Department of Anesthesiology  Postprocedure Note    Patient: Jocelyne Peck. MRN: [id number]  YOB: 1955  Date of evaluation: 1/17/2023      Procedure Summary     Date: 01/17/23 Room / Location: [facility] MAIN OR  / [facility] MAIN OR    Anesthesia Start: 0815 Anesthesia Stop: 9092    Procedure: C3-T1 Posterior Cervical Instrumented Fixation with Lateral Mass Arthrodesis and Decompression from C3-T1 including Foraminotomies at C7-T1 (Spine Cervical) Diagnosis:       Cervical spinal stenosis      Degenerative disc disease, cervical      Cervical radiculopathy      Acquired spondylolisthesis of cervical vertebra      (Cervical spinal stenosis [M48.02])      (Degenerative disc disease, cervical [M50.30])      (Cervical radiculopathy [M54.12])      (Acquired spondylolisthesis of cervical vertebra [M43.12])    Providers: Dina Mathew MD Responsible Provider: Masoud Hollingsworth DO    Anesthesia Type: general ASA Status: 2          Anesthesia Type: No value filed.     Rhea Phase I: Rhea Score: 9    Rhea Phase II:        Anesthesia Post Evaluation    Patient location during evaluation: PACU  Level of consciousness: awake and alert  Airway patency: patent  Nausea & Vomiting: no nausea  Complications: no  Cardiovascular status: hemodynamically stable  Respiratory status: acceptable  Hydration status: euvolemic

## 2023-01-17 NOTE — ANESTHESIA PRE PROCEDURE
Department of Anesthesiology  Preprocedure Note       Name:  Aubrey Smith Jr.   Age:  67 y.o.  :  1955                                          MRN:  [id number]         Date:  2023      Surgeon: Surgeon(s):  Ayden Johns MD    Procedure: Procedure(s):  C3-T1 Posterior Cervical Instrumented Fixation with Lateral Mass Arthrodesis and Decompression from C3-T1 including Foraminotomies at C7-T1    Medications prior to admission:   Prior to Admission medications    Medication Sig Start Date End Date Taking? Authorizing Provider   oxybutynin (DITROPAN-XL) 10 MG extended release tablet Take 10 mg by mouth in the morning and at bedtime  Patient not taking: Reported on 2023    Historical Provider, MD   metoprolol (LOPRESSOR) 100 MG tablet Take 100 mg by mouth 2 times daily    Historical Provider, MD   Celecoxib (CELEBREX PO) Take by mouth    Historical Provider, MD   morphine (MSIR) 15 MG tablet Take 15 mg by mouth every 4 hours as needed for Pain. BID    Historical Provider, MD   clotrimazole (LOTRIMIN) 1 % cream Apply topically 2 times daily Apply topically 2 times daily.    Historical Provider, MD   CALCIUM PO Take by mouth    Historical Provider, MD   UNABLE TO FIND Collagen peptides    Historical Provider, MD   UNABLE TO FIND BCAA    Historical Provider, MD   CREATINE MONOHYDRATE PO Take by mouth    Historical Provider, MD   Whey Protein POWD Take by mouth    Historical Provider, MD   magnesium oxide (MAG-OX) 400 MG tablet Take 400 mg by mouth daily    Historical Provider, MD   Multiple Vitamin (MULTIVITAMIN ADULT PO) Take by mouth    Historical Provider, MD   Zinc Acetate, Oral, (ZINC ACETATE PO) Take 22 mg by mouth    Ar Automatic Reconciliation   acetaminophen (TYLENOL) 500 MG tablet Take 500 mg by mouth 2 times daily    Ar Automatic Reconciliation   diclofenac sodium (VOLTAREN) 1 % GEL Apply 2 g topically 4 times daily as needed    Ar Automatic Reconciliation   melatonin 3 MG TABS tablet Take  by mouth    Ar Automatic Reconciliation   urea (CARMOL) 20 % cream Apply topically daily    Ar Automatic Reconciliation       Current medications:    Current Facility-Administered Medications   Medication Dose Route Frequency Provider Last Rate Last Admin    sodium chloride flush 0.9 % injection 5-40 mL  5-40 mL IntraVENous 2 times per day Nano Delgado MD        sodium chloride flush 0.9 % injection 5-40 mL  5-40 mL IntraVENous PRN Nano Delgado MD        0.9 % sodium chloride infusion   IntraVENous PRN Nano Delgado MD        acetaminophen (TYLENOL) tablet 1,000 mg  1,000 mg Oral Once Nano Delgado MD        vancomycin (VANCOCIN) 1,000 mg in sodium chloride 0.9 % 250 mL IVPB ([id number])  1,000 mg IntraVENous On Call to [address], MD        cefepime (MAXIPIME) 2,000 mg in sodium chloride 0.9 % 50 mL IVPB mini-bag  2,000 mg IntraVENous On Call to [address] MD        pregabalin (LYRICA) capsule 75 mg  75 mg Oral Once Nano eDlgado MD        fentaNYL (SUBLIMAZE) injection 100 mcg  100 mcg IntraVENous Once PRN Madonna Crystal MD        scopolamine (TRANSDERM-SCOP) transdermal patch 1 patch  1 patch TransDERmal Q72H Madonna Crystal MD        lactated ringers infusion   IntraVENous Continuous Madonna Crystal MD        sodium chloride flush 0.9 % injection 5-40 mL  5-40 mL IntraVENous 2 times per day Madonna Crystal MD        midazolam PF (VERSED) injection 2 mg  2 mg IntraVENous Once PRN Madonna Crystal MD           Allergies:  No Known Allergies    Problem List:    Patient Active Problem List   Diagnosis Code    Hyperlipidemia E78.5    Chest pain R07.9    PEREZ (dyspnea on exertion) R06.09    Persistent atrial fibrillation (HCC) I48.19    Cardiomyopathy (HCC) I42.9    Paroxysmal atrial fibrillation (HCC) I48.0    Cervical spinal stenosis M48.02    Degenerative disc disease, cervical M50.30    Cervical radiculopathy M54.12    Acquired spondylolisthesis of cervical vertebra M43.12       Past Medical History:        Diagnosis Date    Atrial fibrillation [facility])     Dx in        Past Surgical History:        Procedure Laterality Date    HERNIA REPAIR Right        Social History:    Social History     Tobacco Use    Smoking status: Former     Packs/day: 0.50     Types: Cigarettes    Smokeless tobacco: Never    Tobacco comments:     Quit smoking: quit about 15 years ago   Substance Use Topics    Alcohol use: Never                                Counseling given: Not Answered  Tobacco comments: Quit smoking: quit about 15 years ago      Vital Signs (Current):   Vitals:    01/17/23 0620   BP: (!) 151/75   Pulse: 50   Resp: 16   Temp: 97.3 °F (36.3 °C)   TempSrc: Oral   SpO2: 98%   Weight: 156 lb 3.2 oz (70.9 kg)   Height: 6' (1.829 m)                                              BP Readings from Last 3 Encounters:   01/17/23 (!) 151/75   01/10/23 (!) 147/85   12/12/22 (!) 161/83       NPO Status:                                                                                 BMI:   Wt Readings from Last 3 Encounters:   01/17/23 156 lb 3.2 oz (70.9 kg)   01/10/23 162 lb 1.6 oz (73.5 kg)   12/12/22 163 lb (73.9 kg)     Body mass index is 21.18 kg/m².     CBC:   Lab Results   Component Value Date/Time    WBC 7.5 01/10/2023 01:28 PM    RBC 4.62 01/10/2023 01:28 PM    HGB 14.3 01/10/2023 01:28 PM    HCT 43.8 01/10/2023 01:28 PM    MCV 94.8 01/10/2023 01:28 PM    RDW 12.0 01/10/2023 01:28 PM     01/10/2023 01:28 PM       CMP:   Lab Results   Component Value Date/Time     01/10/2023 01:28 PM    K 4.0 01/10/2023 01:28 PM     01/10/2023 01:28 PM    CO2 29 01/10/2023 01:28 PM    BUN 20 01/10/2023 01:28 PM    CREATININE 0.91 01/10/2023 01:28 PM    GFRAA >60 09/27/2021 06:42 AM    LABGLOM >60 01/10/2023 01:28 PM    GLUCOSE 87 01/10/2023 01:28 PM    CALCIUM 9.6 01/10/2023 01:28 PM       POC Tests: No results for input(s): POCGLU, POCNA, POCK, POCCL, POCBUN, POCHEMO, POCHCT in the last 72 hours.    Coags:   Lab Results   Component Value Date/Time    PROTIME 14.4 09/27/2021 06:42 AM    INR 1.1 09/27/2021 06:42 AM       HCG (If Applicable): No results found for: PREGTESTUR, PREGSERUM, HCG, HCGQUANT     ABGs: No results found for: PHART, PO2ART, KJJ6GFI, DYC5JMO, BEART, R6QFTMHC     Type & Screen (If Applicable):  No results found for: LABABO, LABRH    Drug/Infectious Status (If Applicable):  No results found for: HIV, HEPCAB    COVID-19 Screening (If Applicable):   Lab Results   Component Value Date/Time    COVID19 Not Detected 09/23/2021 08:11 AM    COVID19 Performed 09/23/2021 08:11 AM           Anesthesia Evaluation  Patient summary reviewed and Nursing notes reviewed  Airway: Mallampati: II  TM distance: >3 FB   Neck ROM: full     Dental:          Pulmonary:Negative Pulmonary ROS and normal exam        (-) asthma                           Cardiovascular:  Exercise tolerance: good (>4 METS),   (+) dysrhythmias: atrial fibrillation,         Rhythm: regular  Rate: normal                    Neuro/Psych:   Negative Neuro/Psych ROS              GI/Hepatic/Renal: Neg GI/Hepatic/Renal ROS            Endo/Other: Negative Endo/Other ROS             Pt had no PAT visit       Abdominal:             Vascular: negative vascular ROS. Other Findings:           Anesthesia Plan      general     ASA 2       Induction: intravenous. MIPS: Postoperative opioids intended and Prophylactic antiemetics administered. Anesthetic plan and risks discussed with patient.       Plan discussed with surgical team.                    Mikel Greenwood MD   1/17/2023

## 2023-01-18 PROCEDURE — 1100000000 HC RM PRIVATE

## 2023-01-18 PROCEDURE — 99024 POSTOP FOLLOW-UP VISIT: CPT | Performed by: NURSE PRACTITIONER

## 2023-01-18 PROCEDURE — 97116 GAIT TRAINING THERAPY: CPT

## 2023-01-18 PROCEDURE — 6360000002 HC RX W HCPCS: Performed by: NEUROLOGICAL SURGERY

## 2023-01-18 PROCEDURE — 6370000000 HC RX 637 (ALT 250 FOR IP): Performed by: NURSE PRACTITIONER

## 2023-01-18 PROCEDURE — 6370000000 HC RX 637 (ALT 250 FOR IP): Performed by: NEUROLOGICAL SURGERY

## 2023-01-18 PROCEDURE — 2580000003 HC RX 258: Performed by: NEUROLOGICAL SURGERY

## 2023-01-18 PROCEDURE — 97530 THERAPEUTIC ACTIVITIES: CPT

## 2023-01-18 PROCEDURE — 97161 PT EVAL LOW COMPLEX 20 MIN: CPT

## 2023-01-18 RX ADMIN — CEFEPIME 2000 MG: 2 INJECTION, POWDER, FOR SOLUTION INTRAVENOUS at 20:30

## 2023-01-18 RX ADMIN — SENNOSIDES AND DOCUSATE SODIUM 1 TABLET: 8.6; 5 TABLET ORAL at 20:30

## 2023-01-18 RX ADMIN — OXYCODONE HYDROCHLORIDE 10 MG: 5 TABLET ORAL at 06:03

## 2023-01-18 RX ADMIN — ACETAMINOPHEN 650 MG: 325 TABLET ORAL at 13:19

## 2023-01-18 RX ADMIN — OXYCODONE HYDROCHLORIDE 10 MG: 5 TABLET ORAL at 16:37

## 2023-01-18 RX ADMIN — SENNOSIDES AND DOCUSATE SODIUM 1 TABLET: 8.6; 5 TABLET ORAL at 08:57

## 2023-01-18 RX ADMIN — METOPROLOL TARTRATE 100 MG: 50 TABLET, FILM COATED ORAL at 08:57

## 2023-01-18 RX ADMIN — CYCLOBENZAPRINE 10 MG: 10 TABLET, FILM COATED ORAL at 02:17

## 2023-01-18 RX ADMIN — SODIUM CHLORIDE: 9 INJECTION, SOLUTION INTRAVENOUS at 02:16

## 2023-01-18 RX ADMIN — CEFEPIME 2000 MG: 2 INJECTION, POWDER, FOR SOLUTION INTRAVENOUS at 08:59

## 2023-01-18 RX ADMIN — SODIUM CHLORIDE, PRESERVATIVE FREE 10 ML: 5 INJECTION INTRAVENOUS at 08:58

## 2023-01-18 RX ADMIN — OXYCODONE HYDROCHLORIDE 10 MG: 5 TABLET ORAL at 20:38

## 2023-01-18 RX ADMIN — ACETAMINOPHEN 650 MG: 325 TABLET ORAL at 08:57

## 2023-01-18 RX ADMIN — CYCLOBENZAPRINE 10 MG: 10 TABLET, FILM COATED ORAL at 13:19

## 2023-01-18 RX ADMIN — ACETAMINOPHEN 650 MG: 325 TABLET ORAL at 02:17

## 2023-01-18 RX ADMIN — OXYCODONE HYDROCHLORIDE 10 MG: 5 TABLET ORAL at 11:01

## 2023-01-18 RX ADMIN — ACETAMINOPHEN 650 MG: 325 TABLET ORAL at 20:30

## 2023-01-18 RX ADMIN — Medication 400 MG: at 08:57

## 2023-01-18 RX ADMIN — VANCOMYCIN HYDROCHLORIDE 1000 MG: 1 INJECTION, POWDER, LYOPHILIZED, FOR SOLUTION INTRAVENOUS at 06:03

## 2023-01-18 RX ADMIN — METOPROLOL TARTRATE 100 MG: 50 TABLET, FILM COATED ORAL at 20:30

## 2023-01-18 RX ADMIN — CYCLOBENZAPRINE 10 MG: 10 TABLET, FILM COATED ORAL at 20:30

## 2023-01-18 RX ADMIN — SODIUM CHLORIDE, PRESERVATIVE FREE 5 ML: 5 INJECTION INTRAVENOUS at 20:30

## 2023-01-18 RX ADMIN — BISACODYL 5 MG: 5 TABLET, COATED ORAL at 08:57

## 2023-01-18 RX ADMIN — CYCLOBENZAPRINE 10 MG: 10 TABLET, FILM COATED ORAL at 08:57

## 2023-01-18 ASSESSMENT — PAIN DESCRIPTION - LOCATION
LOCATION: NECK

## 2023-01-18 ASSESSMENT — PAIN SCALES - GENERAL
PAINLEVEL_OUTOF10: 7
PAINLEVEL_OUTOF10: 2
PAINLEVEL_OUTOF10: 4
PAINLEVEL_OUTOF10: 0
PAINLEVEL_OUTOF10: 7

## 2023-01-18 ASSESSMENT — PAIN DESCRIPTION - PAIN TYPE
TYPE: SURGICAL PAIN

## 2023-01-18 ASSESSMENT — PAIN DESCRIPTION - DESCRIPTORS
DESCRIPTORS: SORE;TIGHTNESS
DESCRIPTORS: SHOOTING;ACHING
DESCRIPTORS: ACHING
DESCRIPTORS: DULL;TIGHTNESS

## 2023-01-18 ASSESSMENT — PAIN DESCRIPTION - FREQUENCY
FREQUENCY: INTERMITTENT
FREQUENCY: INTERMITTENT
FREQUENCY: CONTINUOUS

## 2023-01-18 ASSESSMENT — PAIN DESCRIPTION - ORIENTATION
ORIENTATION: POSTERIOR

## 2023-01-18 ASSESSMENT — PAIN DESCRIPTION - ONSET
ONSET: GRADUAL
ONSET: ON-GOING
ONSET: ON-GOING

## 2023-01-18 ASSESSMENT — PAIN - FUNCTIONAL ASSESSMENT: PAIN_FUNCTIONAL_ASSESSMENT: PREVENTS OR INTERFERES SOME ACTIVE ACTIVITIES AND ADLS
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
## 2023-01-18 NOTE — PROGRESS NOTES
[facility]    Assessment: S/P C3-T1 posterior lami fusion    POD#1    Plan:  -PT to mobilize  -monitor drain output    Subjective: Pt states that most of his presurgical hand numbness is already going away    Objective:  Afebrile  VSS  GCS15  Moving all extremities  Incision CDI  Alert and oriented x4  Drain output yesterday 190cc    Patient Vitals for the past 12 hrs:   Temp Pulse Resp BP SpO2   01/18/23 0603 -- -- 19 -- --   01/18/23 0353 98.6 °F (37 °C) 78 18 (!) 141/80 97 %   01/18/23 0005 -- -- 18 -- --   01/17/23 2325 97.7 °F (36.5 °C) 66 18 (!) 156/99 100 %        No results found for this or any previous visit (from the past 24 hour(s)).      Jacquelyn Irish, APRN - CNP

## 2023-01-18 NOTE — PROGRESS NOTES
ACUTE PHYSICAL THERAPY GOALS:   (Developed with and agreed upon by patient and/or caregiver.)    (1.) Nanci Grijalva will transfer from bed to chair and chair to bed with MODIFIED INDEPENDENCE using the least restrictive device within 7 treatment day(s). (2.) Nanci Grijalva will ambulate with MODIFIED INDEPENDENCE for 1500 feet with the least restrictive device within 7 treatment day(s). (3.) Nanci Hoffman. will perform standing static and dynamic balance activities x 10 minutes with SUPERVISION to improve safety within 7 treatment day(s). (3.) Nanci Hoffman. will ascend/descend 8 steps using 1 handrail with SUPERVISION  to improve safety and functional mobility within 7 treatment day(s). (4.) Nanci Hoffman. will perform bilateral lower extremity exercises x 15 min for HEP with MODIFIED INDEPENDENCE to improve strength, endurance, and functional mobility within 7 treatment day(s). PHYSICAL THERAPY: Daily Note PM   (Link to Caseload Tracking: PT Visit Days : 1  Time In/Out PT Charge Capture  Rehab Caseload Tracker  Orders    Nanci Grijalva is a 74 y.o. male   PRIMARY DIAGNOSIS: <principal problem not specified>  Cervical spinal stenosis [M48.02]  Degenerative disc disease, cervical [M50.30]  Cervical radiculopathy [M54.12]  Acquired spondylolisthesis of cervical vertebra [M43.12]  Procedure(s) (LRB):  C3-T1 Posterior Cervical Instrumented Fixation with Lateral Mass Arthrodesis and Decompression from C3-T1 including Foraminotomies at C7-T1 (N/A)  1 Day Post-Op  Inpatient: Payor: Jung Peoples / Plan: Jung Peoples / Product Type: *No Product type* /     ASSESSMENT:     REHAB RECOMMENDATIONS:   Recommendation to date pending progress:  Setting:  Home Health Therapy    Equipment:    None     ASSESSMENT:  Mr. Bienvenido Handley is still up in recliner chair upon arrival for pm session and continues to report feeling well and being up in room with no issues.  He ambulates in hallway for 1000 ft with no AD at SBA with occasional cueing for increased foot clearance and safety. Patient continues to tolerate all mobility well. Will continue to follow.       SUBJECTIVE:   Mr. Tia Hernandez states, [place] this just so I can get some sleep\"     Social/Functional Lives With: Daughter  Type of Home: Mobile home  Home Layout: One level  Home Access: Stairs to enter with rails  Entrance Stairs - Number of Steps: 4 (for camper and home)  Bathroom Shower/Tub:  (Showers at [facility] center)  [employer]: 3-in-1 commode  Has the patient had two or more falls in the past year or any fall with injury in the past year?: No  OBJECTIVE:     PAIN: VITALS / O2: PRECAUTION / Ailyn Nunez / Aubrey Marquez:   Pre Treatment: 5/10         Post Treatment: 5/10 Vitals        Oxygen    IV    RESTRICTIONS/PRECAUTIONS:   Cervical Spinal Precautions      MOBILITY: I Mod I S SBA CGA Min Mod Max Total  NT x2 Comments:   Bed Mobility    Rolling [] [x] [] [] [] [] [] [] [] [] []    Supine to Sit [] [x] [] [] [] [] [] [] [] [] []    Scooting [] [x] [] [] [] [] [] [] [] [] []    Sit to Supine [] [x] [] [] [] [] [] [] [] [] []    Transfers    Sit to Stand [] [] [x] [] [] [] [] [] [] [] []    Bed to Chair [] [] [x] [] [] [] [] [] [] [] []    Stand to Sit [] [] [x] [] [] [] [] [] [] [] []     [] [] [] [] [] [] [] [] [] [] []    I=Independent, Mod I=Modified Independent, S=Supervision, SBA=Standby Assistance, CGA=Contact Guard Assistance,   Min=Minimal Assistance, Mod=Moderate Assistance, Max=Maximal Assistance, Total=Total Assistance, NT=Not Tested    BALANCE: Good Fair+ Fair Fair- Poor NT Comments   Sitting Static [x] [] [] [] [] []    Sitting Dynamic [x] [] [] [] [] []              Standing Static [] [x] [] [] [] []    Standing Dynamic [] [] [x] [] [] []      GAIT: I Mod I S SBA CGA Min Mod Max Total  NT x2 Comments:   Level of Assistance [] [] [] [x] [] [] [] [] [] [] []    Distance 1000 feet    DME None    Gait Quality Decreased step clearance, Decreased step length, and Decreased stance    Weightbearing Status      Stairs      I=Independent, Mod I=Modified Independent, S=Supervision, SBA=Standby Assistance, CGA=Contact Guard Assistance,   Min=Minimal Assistance, Mod=Moderate Assistance, Max=Maximal Assistance, Total=Total Assistance, NT=Not Tested    PLAN:   FREQUENCY AND DURATION: BID for duration of hospital stay or until stated goals are met, whichever comes first.    TREATMENT:   TREATMENT:   Gait Training (16 Minutes): Gait training for 1000 feet utilizing no AD. Patient required Verbal cueing to improve Activity Pacing and Gait Mechanics.      TREATMENT GRID:  N/A    AFTER TREATMENT PRECAUTIONS: Bed/Chair Locked, Call light within reach, Chair, Needs within reach, and RN notified    INTERDISCIPLINARY COLLABORATION:  RN/ PCT and PT/ PTA    EDUCATION: Education Given To: Patient  Education Provided: Role of Therapy;Plan of Care;Precautions  Education Method: Verbal  Barriers to Learning: None  Education Outcome: Verbalized understanding    TIME IN/OUT:  Time In: 1400  Time Out: 911 [address] [place] [address]  Minutes: [place], [place]

## 2023-01-19 PROCEDURE — 97530 THERAPEUTIC ACTIVITIES: CPT

## 2023-01-19 PROCEDURE — 6370000000 HC RX 637 (ALT 250 FOR IP): Performed by: NURSE PRACTITIONER

## 2023-01-19 PROCEDURE — 2580000003 HC RX 258: Performed by: NEUROLOGICAL SURGERY

## 2023-01-19 PROCEDURE — 99024 POSTOP FOLLOW-UP VISIT: CPT | Performed by: NURSE PRACTITIONER

## 2023-01-19 PROCEDURE — 6370000000 HC RX 637 (ALT 250 FOR IP): Performed by: NEUROLOGICAL SURGERY

## 2023-01-19 PROCEDURE — 6360000002 HC RX W HCPCS: Performed by: NEUROLOGICAL SURGERY

## 2023-01-19 PROCEDURE — 1100000000 HC RM PRIVATE

## 2023-01-19 RX ADMIN — Medication 400 MG: at 08:07

## 2023-01-19 RX ADMIN — OXYCODONE HYDROCHLORIDE 5 MG: 5 TABLET ORAL at 20:54

## 2023-01-19 RX ADMIN — OXYCODONE HYDROCHLORIDE 10 MG: 5 TABLET ORAL at 13:36

## 2023-01-19 RX ADMIN — SODIUM CHLORIDE, PRESERVATIVE FREE 5 ML: 5 INJECTION INTRAVENOUS at 20:57

## 2023-01-19 RX ADMIN — METOPROLOL TARTRATE 100 MG: 50 TABLET, FILM COATED ORAL at 08:07

## 2023-01-19 RX ADMIN — OXYCODONE HYDROCHLORIDE 10 MG: 5 TABLET ORAL at 08:06

## 2023-01-19 RX ADMIN — METOPROLOL TARTRATE 100 MG: 50 TABLET, FILM COATED ORAL at 20:54

## 2023-01-19 RX ADMIN — ACETAMINOPHEN 650 MG: 325 TABLET ORAL at 20:54

## 2023-01-19 RX ADMIN — CEFEPIME 2000 MG: 2 INJECTION, POWDER, FOR SOLUTION INTRAVENOUS at 08:07

## 2023-01-19 RX ADMIN — CYCLOBENZAPRINE 10 MG: 10 TABLET, FILM COATED ORAL at 08:07

## 2023-01-19 RX ADMIN — CYCLOBENZAPRINE 10 MG: 10 TABLET, FILM COATED ORAL at 13:35

## 2023-01-19 RX ADMIN — SENNOSIDES AND DOCUSATE SODIUM 1 TABLET: 8.6; 5 TABLET ORAL at 08:07

## 2023-01-19 RX ADMIN — ACETAMINOPHEN 650 MG: 325 TABLET ORAL at 01:33

## 2023-01-19 RX ADMIN — SODIUM CHLORIDE, PRESERVATIVE FREE 10 ML: 5 INJECTION INTRAVENOUS at 08:17

## 2023-01-19 RX ADMIN — ACETAMINOPHEN 650 MG: 325 TABLET ORAL at 13:36

## 2023-01-19 RX ADMIN — BISACODYL 5 MG: 5 TABLET, COATED ORAL at 08:07

## 2023-01-19 RX ADMIN — CYCLOBENZAPRINE 10 MG: 10 TABLET, FILM COATED ORAL at 20:57

## 2023-01-19 RX ADMIN — CEFEPIME 2000 MG: 2 INJECTION, POWDER, FOR SOLUTION INTRAVENOUS at 20:57

## 2023-01-19 RX ADMIN — ACETAMINOPHEN 650 MG: 325 TABLET ORAL at 08:07

## 2023-01-19 RX ADMIN — SENNOSIDES AND DOCUSATE SODIUM 1 TABLET: 8.6; 5 TABLET ORAL at 20:54

## 2023-01-19 ASSESSMENT — PAIN SCALES - GENERAL
PAINLEVEL_OUTOF10: 2
PAINLEVEL_OUTOF10: 7
PAINLEVEL_OUTOF10: 7
PAINLEVEL_OUTOF10: 4

## 2023-01-19 ASSESSMENT — PAIN DESCRIPTION - ONSET
ONSET: ON-GOING

## 2023-01-19 ASSESSMENT — PAIN DESCRIPTION - LOCATION
LOCATION: BACK
LOCATION: NECK;SHOULDER
LOCATION: NECK;SHOULDER

## 2023-01-19 ASSESSMENT — PAIN DESCRIPTION - ORIENTATION
ORIENTATION: POSTERIOR;RIGHT
ORIENTATION: POSTERIOR;RIGHT
ORIENTATION: MID

## 2023-01-19 ASSESSMENT — PAIN DESCRIPTION - PAIN TYPE
TYPE: SURGICAL PAIN;CHRONIC PAIN
TYPE: SURGICAL PAIN;CHRONIC PAIN
TYPE: SURGICAL PAIN

## 2023-01-19 ASSESSMENT — PAIN DESCRIPTION - FREQUENCY
FREQUENCY: INTERMITTENT

## 2023-01-19 ASSESSMENT — PAIN DESCRIPTION - DESCRIPTORS
DESCRIPTORS: ACHING
DESCRIPTORS: TIGHTNESS;SHOOTING
DESCRIPTORS: TIGHTNESS;SHOOTING

## 2023-01-19 ASSESSMENT — PAIN - FUNCTIONAL ASSESSMENT: PAIN_FUNCTIONAL_ASSESSMENT: ACTIVITIES ARE NOT PREVENTED

## 2023-01-19 NOTE — ACP (ADVANCE CARE PLANNING)
Advance Care Planning     Advance Care Planning Inpatient Note  [facility] Department    Today's Date: 1/19/2023  Unit: SFD 7 MED SURG    Received request from [employer]. Upon review of chart and communication with care team, patient's decision making abilities are not in question. Patient was present in the room during visit. Goals of ACP Conversation:  Discuss advance care planning documents  Facilitate a discussion related to patient's goals of care as they align with the patient's values and beliefs. Health Care Decision Makers:     No healthcare decision makers have been documented. Click here to complete [address] including selection of the Healthcare Decision Maker Relationship (ie \"Primary\")  Summary:   I reviewed the HCPOA document with Mr. Joseph Guy. Patient declined completing the document during the visit. Mr. Joseph Guy received instructions for contacting the  if he desires additional assistance. According to Mr. Joseph Guy, his daughter is listed on the electronic chart and she is patient's next of kin per patient. Patient understands his daughter would be able to serve as his surrogate decision maker without a completed HCPOA. Documented Next of Kin, per patient report. Advance Care Planning Documents (Patient Wishes):  No documents are on file.        Interventions:  Provided education on documents for clarity and greater understanding  Discussed and provided education on state decision maker hierarchy  Encouraged ongoing ACP conversation with future decision makers and loved ones  Reviewed but did not complete ACP document    Care Preferences Communicated:   No    Outcomes/Plan:  ACP Discussion: Completed    Electronically signed by Manasa Cano, [address] on 1/19/2023 at 1:52 PM

## 2023-01-19 NOTE — PROGRESS NOTES
ACUTE PHYSICAL THERAPY GOALS:   (Developed with and agreed upon by patient and/or caregiver.)    (1.) [place] Shingles. will transfer from bed to chair and chair to bed with MODIFIED INDEPENDENCE using the least restrictive device within 7 treatment day(s). (2.) [place] Shingles. will ambulate with MODIFIED INDEPENDENCE for 1500 feet with the least restrictive device within 7 treatment day(s). (3.) [place] Shingles. will perform standing static and dynamic balance activities x 10 minutes with SUPERVISION to improve safety within 7 treatment day(s). (3.) [place] Shingles. will ascend/descend 8 steps using 1 handrail with SUPERVISION  to improve safety and functional mobility within 7 treatment day(s). (4.) [place] Shingles. will perform bilateral lower extremity exercises x 15 min for HEP with MODIFIED INDEPENDENCE to improve strength, endurance, and functional mobility within 7 treatment day(s). PHYSICAL THERAPY: Daily Note AM   (Link to Caseload Tracking: PT Visit Days : 2  Time In/Out PT Charge Capture  Rehab Caseload Tracker  Orders    Aiden Woodruff is a 74 y.o. male   PRIMARY DIAGNOSIS: <principal problem not specified>  Cervical spinal stenosis [M48.02]  Degenerative disc disease, cervical [M50.30]  Cervical radiculopathy [M54.12]  Acquired spondylolisthesis of cervical vertebra [M43.12]  Procedure(s) (LRB):  C3-T1 Posterior Cervical Instrumented Fixation with Lateral Mass Arthrodesis and Decompression from C3-T1 including Foraminotomies at C7-T1 (N/A)  2 Days Post-Op  Inpatient: Payor: Maciej Looney / Plan: Maciej Looney / Product Type: *No Product type* /     ASSESSMENT:     REHAB RECOMMENDATIONS:   Recommendation to date pending progress:  Setting:  Home Health Therapy    Equipment:    None     ASSESSMENT:  Mr. Anna Bo presents supine and is very agreeable to therapy.   He sat to side of bed, donned cervical collar, and was able to ambulate in nettles 1000' with no assistive device, cues for increased foot clearance and safety. He returned to bed, supine and was positioned to comfort. He reports being up to bathroom ad hannah. He is making good progress and continues to tolerate all mobility well. Will continue to follow. SUBJECTIVE:   Mr. Jayne Sims is pleasant with no complaints.      Social/Functional Lives With: Daughter  Type of Home: Trailer ()  Home Layout: One level  Home Access: Stairs to enter with rails  Entrance Stairs - Number of Steps: 4  Entrance Stairs - Rails: Left  Bathroom Shower/Tub: None (Showers at [facility] center)  [employer]: 3-in-1 commode  Has the patient had two or more falls in the past year or any fall with injury in the past year?: No  Receives Help From: Family  ADL Assistance: [address] [place] [address] Avenue: Independent  Homemaking Responsibilities: Yes  Ambulation Assistance: Independent  Transfer Assistance: Independent  Active : No  Patient's  Info: Daughter  Mode of Transportation: Car  Occupation: On disability  OBJECTIVE:     PAIN: VITALS / O2: PRECAUTION / Sophie Seashore / Catrina Leyland:   Pre Treatment: 5/10         Post Treatment: 5/10 Vitals        Oxygen    Hemovac and IV    RESTRICTIONS/PRECAUTIONS:  Restrictions/Precautions  Required Braces or Orthoses?: Yes  Required Braces or Orthoses?: Yes  Required Braces or Orthoses  Cervical: c-collarCervical Spinal Precautions      MOBILITY: I Mod I S SBA CGA Min Mod Max Total  NT x2 Comments:   Bed Mobility    Rolling [] [x] [] [] [] [] [] [] [] [] []    Supine to Sit [] [x] [] [] [] [] [] [] [] [] []    Scooting [] [x] [] [] [] [] [] [] [] [] []    Sit to Supine [] [x] [] [] [] [] [] [] [] [] []    Transfers    Sit to Stand [] [] [x] [] [] [] [] [] [] [] []    Bed to Chair [] [] [x] [] [] [] [] [] [] [] []    Stand to Sit [] [] [x] [] [] [] [] [] [] [] []     [] [] [] [] [] [] [] [] [] [] []    I=Independent, Mod I=Modified Independent, S=Supervision, SBA=Standby Assistance, CGA=Contact Guard Assistance,   Min=Minimal Assistance, Mod=Moderate Assistance, Max=Maximal Assistance, Total=Total Assistance, NT=Not Tested    BALANCE: Good Fair+ Fair Fair- Poor NT Comments   Sitting Static [x] [] [] [] [] []    Sitting Dynamic [x] [] [] [] [] []              Standing Static [] [x] [] [] [] []    Standing Dynamic [] [] [x] [] [] []      GAIT: I Mod I S SBA CGA Min Mod Max Total  NT x2 Comments:   Level of Assistance [] [] [] [x] [] [] [] [] [] [] []    Distance 1000 feet    DME None    Gait Quality Decreased step clearance, Decreased step length, and Decreased stance    Weightbearing Status      Stairs      I=Independent, Mod I=Modified Independent, S=Supervision, SBA=Standby Assistance, CGA=Contact Guard Assistance,   Min=Minimal Assistance, Mod=Moderate Assistance, Max=Maximal Assistance, Total=Total Assistance, NT=Not Tested    PLAN:   FREQUENCY AND DURATION: BID for duration of hospital stay or until stated goals are met, whichever comes first.    TREATMENT:   TREATMENT:   Therapeutic Activity (16 Minutes): Therapeutic activity included Supine to Sit, Sit to Supine, Scooting, Ambulation on level ground, and Standing balance to improve functional Activity tolerance, Balance, Mobility, and Strength.     TREATMENT GRID:  N/A    AFTER TREATMENT PRECAUTIONS: Bed, Bed/Chair Locked, Call light within reach, Needs within reach, RN notified, and Side rails x3    INTERDISCIPLINARY COLLABORATION:  RN/ PCT and PT/ PTA    EDUCATION:      TIME IN/OUT:  Time In: 1020  Time Out: 1036  Minutes: 16    LIZETTE WORTHINGTON PTA

## 2023-01-19 NOTE — PLAN OF CARE
Problem: Pain  Goal: Verbalizes/displays adequate comfort level or baseline comfort level  1/18/2023 2120 by Uzair Varela RN  Outcome: Progressing  1/18/2023 1148 by Eber Nguyen RN  Outcome: Progressing     Problem: Safety - Adult  Goal: Free from fall injury  1/18/2023 2120 by Uzair Varela RN  Outcome: Progressing  Flowsheets (Taken 1/18/2023 2026)  Free From Fall Injury: Instruct family/caregiver on patient safety  1/18/2023 1148 by Eber Nguyen RN  Outcome: Progressing     Problem: Discharge Planning  Goal: Discharge to home or other facility with appropriate resources  1/18/2023 2120 by Uzair Varela RN  Outcome: Progressing  Flowsheets (Taken 1/18/2023 2026)  Discharge to home or other facility with appropriate resources: Identify barriers to discharge with patient and caregiver  1/18/2023 1148 by Eber Nguyen RN  Outcome: Progressing  Flowsheets (Taken 1/18/2023 0730)  Discharge to home or other facility with appropriate resources: Identify barriers to discharge with patient and caregiver     Problem: ABCDS Injury Assessment  Goal: Absence of physical injury  1/18/2023 2120 by Uzair Varela RN  Outcome: Progressing  1/18/2023 1148 by Eber Nguyen RN  Outcome: Progressing  Flowsheets (Taken 1/18/2023 0730)  Absence of Physical Injury: Implement safety measures based on patient assessment

## 2023-01-19 NOTE — PROGRESS NOTES
Advance Care Planning     Advance Care Planning Inpatient Note  [facility] Department    Today's Date: 1/19/2023  Unit: SFD 7 MED SURG    Received request from [employer]. Upon review of chart and communication with care team, patient's decision making abilities are not in question. Patient was present in the room during visit. Goals of ACP Conversation:  Discuss advance care planning documents  Facilitate a discussion related to patient's goals of care as they align with the patient's values and beliefs. Health Care Decision Makers:     No healthcare decision makers have been documented. Click here to complete [address] including selection of the Healthcare Decision Maker Relationship (ie \"Primary\")  Summary:   I reviewed the HCPOA document with Mr. Bernabe Schaefer. Patient declined completing the document during the visit. Mr. Bernabe Schaefer received instructions for contacting the  if he desires additional assistance. According to Mr. Bernabe Schaefer, his daughter is listed on the electronic chart and she is patient's next of kin per patient. Patient understands his daughter would be able to serve as his surrogate decision maker without a completed HCPOA. Documented Next of Kin, per patient report. Advance Care Planning Documents (Patient Wishes):  No documents are on file.        Interventions:  Provided education on documents for clarity and greater understanding  Discussed and provided education on state decision maker hierarchy  Encouraged ongoing ACP conversation with future decision makers and loved ones  Reviewed but did not complete ACP document    Care Preferences Communicated:   No    Outcomes/Plan:  ACP Discussion: Completed    Electronically signed by Kerri Lee, [address] on 1/19/2023 at 1:52 PM

## 2023-01-19 NOTE — PROGRESS NOTES
[facility]    Assessment: S/P C3-T1 posterior lami fusion    POD#2    Plan:  -PT to mobilize  -monitor drain output  -if drain output is low enough plan to discharge pt tomorrow    Subjective:     Objective:  Afebrile  VSS  GCS15  Moving all extremities  Incision CDI  Alert and oriented x4  Drain output yesterday 145cc    Patient Vitals for the past 12 hrs:   Temp Pulse Resp BP SpO2   01/19/23 0801 98.6 °F (37 °C) 91 18 (!) 152/84 99 %   01/19/23 0357 97.9 °F (36.6 °C) 72 18 (!) 146/90 97 %   01/18/23 2322 97.7 °F (36.5 °C) 77 18 (!) 154/86 96 %          No results found for this or any previous visit (from the past 24 hour(s)).      Thony Hood, APRN - CNP

## 2023-01-19 NOTE — CARE COORDINATION
MSN, CM:  spoke with patient this AM about discharge planning. Patient lives with his daughter in a RV with 4 steps for entrance. Patient is independent with all ADLs' and requires no equipment for ambulation. Patient does not drive but his daughter drives him to all appointments. PT recommends HH which will be referred to [facility]. Patient is agreeable to this discharge plan. Case Management will continue to follow for any other discharge needs. 01/19/23 5208   Service Assessment   Patient Orientation Alert and Oriented   Cognition Alert   History Provided By Patient   Primary [address]   Patient's Healthcare Decision Maker is: Legal Next of Kin   PCP Verified by CM Yes   Last Visit to PCP Within last 3 months   Prior Functional Level Independent in ADLs/IADLs   Current Functional Level Independent in ADLs/IADLs   Can patient return to prior living arrangement Yes   Ability to make needs known: Good   Family able to assist with home care needs: Yes   Would you like for me to discuss the discharge plan with any other family members/significant others, and if so, who? Yes   Financial Resources  ([place])   Social/Functional History   Lives With Daughter   Type of Home Trailer  (RV)   Home Layout One level   Home Access Stairs to enter with rails   Entrance Stairs - Number of Steps 4   Entrance Stairs - Rails Left   Bathroom Shower/Tub None  ([facility])   Bathroom Equipment 3-in-1 commode   Receives Help From Family   ADL Assistance Independent   Homemaking Assistance Independent   Homemaking Responsibilities Yes   Ambulation Assistance Independent   Transfer Assistance Independent   Active  No   Patient's  Info Daughter   Mode of Transportation Car   Occupation On disability   Discharge Planning   Type of Residence Other (Comment)  (RV)   Living Arrangements Children   Current Services Prior To Admission None   DME Ordered?  No   Potential Assistance Purchasing Medications No   Type of Home Care Services None   Patient expects to be discharged to:  Other (comment)  (RV)   One/Two Story Residence One story   History of falls? 0

## 2023-01-19 NOTE — PROGRESS NOTES
Physical Therapy Note:    Attempted to see patient this PM for physical therapy treatment  session. Patient just back in bed from using bathroom and requested to stay in bed and rest at this time. Will follow and re-attempt as schedule permits/patient available.  Thank you,    LIZETTE WORTHINGTON, [facility]     Rehab Caseload Tracker

## 2023-01-20 PROCEDURE — 6370000000 HC RX 637 (ALT 250 FOR IP): Performed by: NURSE PRACTITIONER

## 2023-01-20 PROCEDURE — 1100000000 HC RM PRIVATE

## 2023-01-20 PROCEDURE — 97530 THERAPEUTIC ACTIVITIES: CPT

## 2023-01-20 PROCEDURE — 6370000000 HC RX 637 (ALT 250 FOR IP): Performed by: NEUROLOGICAL SURGERY

## 2023-01-20 PROCEDURE — 99024 POSTOP FOLLOW-UP VISIT: CPT | Performed by: NURSE PRACTITIONER

## 2023-01-20 PROCEDURE — 2580000003 HC RX 258: Performed by: NEUROLOGICAL SURGERY

## 2023-01-20 PROCEDURE — 6370000000 HC RX 637 (ALT 250 FOR IP): Performed by: FAMILY MEDICINE

## 2023-01-20 PROCEDURE — 6360000002 HC RX W HCPCS: Performed by: NEUROLOGICAL SURGERY

## 2023-01-20 RX ORDER — CYCLOBENZAPRINE HCL 10 MG
10 TABLET ORAL 3 TIMES DAILY PRN
Qty: 90 TABLET | Refills: 0 | Status: SHIPPED | OUTPATIENT
Start: 2023-01-20 | End: 2023-02-19

## 2023-01-20 RX ORDER — DIMETHICONE, CAMPHOR (SYNTHETIC), MENTHOL, AND PHENOL 1.1; .5; .625; .5 G/100G; G/100G; G/100G; G/100G
OINTMENT TOPICAL PRN
Status: DISCONTINUED | OUTPATIENT
Start: 2023-01-20 | End: 2023-01-21 | Stop reason: HOSPADM

## 2023-01-20 RX ADMIN — CEFEPIME 2000 MG: 2 INJECTION, POWDER, FOR SOLUTION INTRAVENOUS at 09:04

## 2023-01-20 RX ADMIN — CYCLOBENZAPRINE 10 MG: 10 TABLET, FILM COATED ORAL at 12:54

## 2023-01-20 RX ADMIN — OXYCODONE HYDROCHLORIDE 10 MG: 5 TABLET ORAL at 17:51

## 2023-01-20 RX ADMIN — ACETAMINOPHEN 650 MG: 325 TABLET ORAL at 01:24

## 2023-01-20 RX ADMIN — ACETAMINOPHEN 650 MG: 325 TABLET ORAL at 09:02

## 2023-01-20 RX ADMIN — ACETAMINOPHEN 650 MG: 325 TABLET ORAL at 20:44

## 2023-01-20 RX ADMIN — CEFEPIME 2000 MG: 2 INJECTION, POWDER, FOR SOLUTION INTRAVENOUS at 20:47

## 2023-01-20 RX ADMIN — Medication 400 MG: at 09:01

## 2023-01-20 RX ADMIN — CYCLOBENZAPRINE 10 MG: 10 TABLET, FILM COATED ORAL at 09:01

## 2023-01-20 RX ADMIN — OXYCODONE HYDROCHLORIDE 10 MG: 5 TABLET ORAL at 12:54

## 2023-01-20 RX ADMIN — METOPROLOL TARTRATE 100 MG: 50 TABLET, FILM COATED ORAL at 09:01

## 2023-01-20 RX ADMIN — ACETAMINOPHEN 650 MG: 325 TABLET ORAL at 12:54

## 2023-01-20 RX ADMIN — OXYCODONE HYDROCHLORIDE 5 MG: 5 TABLET ORAL at 01:24

## 2023-01-20 RX ADMIN — OXYCODONE HYDROCHLORIDE 10 MG: 5 TABLET ORAL at 09:00

## 2023-01-20 RX ADMIN — SODIUM CHLORIDE, PRESERVATIVE FREE 10 ML: 5 INJECTION INTRAVENOUS at 09:04

## 2023-01-20 RX ADMIN — SENNOSIDES AND DOCUSATE SODIUM 1 TABLET: 8.6; 5 TABLET ORAL at 20:45

## 2023-01-20 RX ADMIN — SENNOSIDES AND DOCUSATE SODIUM 1 TABLET: 8.6; 5 TABLET ORAL at 09:02

## 2023-01-20 RX ADMIN — OXYCODONE HYDROCHLORIDE 10 MG: 5 TABLET ORAL at 22:27

## 2023-01-20 RX ADMIN — CYCLOBENZAPRINE 10 MG: 10 TABLET, FILM COATED ORAL at 20:45

## 2023-01-20 RX ADMIN — SODIUM CHLORIDE, PRESERVATIVE FREE 10 ML: 5 INJECTION INTRAVENOUS at 20:54

## 2023-01-20 RX ADMIN — METOPROLOL TARTRATE 100 MG: 50 TABLET, FILM COATED ORAL at 20:45

## 2023-01-20 RX ADMIN — BISACODYL 5 MG: 5 TABLET, COATED ORAL at 09:02

## 2023-01-20 RX ADMIN — OXYCODONE HYDROCHLORIDE 5 MG: 5 TABLET ORAL at 05:04

## 2023-01-20 RX ADMIN — Medication: at 22:35

## 2023-01-20 ASSESSMENT — PAIN DESCRIPTION - FREQUENCY
FREQUENCY: INTERMITTENT
FREQUENCY: INTERMITTENT

## 2023-01-20 ASSESSMENT — PAIN DESCRIPTION - ONSET
ONSET: ON-GOING
ONSET: ON-GOING

## 2023-01-20 ASSESSMENT — PAIN SCALES - GENERAL
PAINLEVEL_OUTOF10: 8
PAINLEVEL_OUTOF10: 2
PAINLEVEL_OUTOF10: 8
PAINLEVEL_OUTOF10: 2
PAINLEVEL_OUTOF10: 6
PAINLEVEL_OUTOF10: 2
PAINLEVEL_OUTOF10: 4
PAINLEVEL_OUTOF10: 7
PAINLEVEL_OUTOF10: 3
PAINLEVEL_OUTOF10: 4

## 2023-01-20 ASSESSMENT — PAIN DESCRIPTION - PAIN TYPE
TYPE: SURGICAL PAIN
TYPE: SURGICAL PAIN

## 2023-01-20 ASSESSMENT — PAIN DESCRIPTION - LOCATION
LOCATION: BACK

## 2023-01-20 ASSESSMENT — PAIN DESCRIPTION - ORIENTATION
ORIENTATION: MID

## 2023-01-20 ASSESSMENT — PAIN - FUNCTIONAL ASSESSMENT
PAIN_FUNCTIONAL_ASSESSMENT: PREVENTS OR INTERFERES SOME ACTIVE ACTIVITIES AND ADLS
PAIN_FUNCTIONAL_ASSESSMENT: ACTIVITIES ARE NOT PREVENTED

## 2023-01-20 ASSESSMENT — PAIN DESCRIPTION - DESCRIPTORS
DESCRIPTORS: ACHING

## 2023-01-20 NOTE — PROGRESS NOTES
[facility]    Assessment: S/P C3-T1 posterior lami fusion    POD#3    Plan:  -PT to mobilize  -monitor drain output  -pt to take morphine (has at home already) for post operative pain medicine. Flexeril prescription printed in chart for pt to take to [facility]. Subjective:     Objective:  Afebrile  VSS  GCS15  Moving all extremities  Incision CDI  Alert and oriented x4  Drain output yesterday 135cc    Patient Vitals for the past 12 hrs:   Temp Pulse Resp BP SpO2   01/20/23 0747 98.6 °F (37 °C) 96 18 129/83 96 %   01/20/23 0452 97.2 °F (36.2 °C) 85 17 (!) 148/81 96 %   01/20/23 0124 -- -- 18 -- --   01/20/23 0012 98.2 °F (36.8 °C) 72 18 132/75 94 %          No results found for this or any previous visit (from the past 24 hour(s)).      IGOR Bullard - CNP

## 2023-01-20 NOTE — PROGRESS NOTES
ACUTE PHYSICAL THERAPY GOALS:   (Developed with and agreed upon by patient and/or caregiver.)    (1.) Peggy Kaylyn. will transfer from bed to chair and chair to bed with MODIFIED INDEPENDENCE using the least restrictive device within 7 treatment day(s). (2.) Cleves Kaylyn. will ambulate with MODIFIED INDEPENDENCE for 1500 feet with the least restrictive device within 7 treatment day(s). (3.) Peggy Kaylyn. will perform standing static and dynamic balance activities x 10 minutes with SUPERVISION to improve safety within 7 treatment day(s). (3.) Cleves Kaylyn. will ascend/descend 8 steps using 1 handrail with SUPERVISION  to improve safety and functional mobility within 7 treatment day(s). (4.) Cleves Kaylyn. will perform bilateral lower extremity exercises x 15 min for HEP with MODIFIED INDEPENDENCE to improve strength, endurance, and functional mobility within 7 treatment day(s). PHYSICAL THERAPY: Daily Note PM   (Link to Caseload Tracking: PT Visit Days : 3  Time In/Out PT Charge Capture  Rehab Caseload Tracker  Orders    Peggy Medina is a 74 y.o. male   PRIMARY DIAGNOSIS: <principal problem not specified>  Cervical spinal stenosis [M48.02]  Degenerative disc disease, cervical [M50.30]  Cervical radiculopathy [M54.12]  Acquired spondylolisthesis of cervical vertebra [M43.12]  Procedure(s) (LRB):  C3-T1 Posterior Cervical Instrumented Fixation with Lateral Mass Arthrodesis and Decompression from C3-T1 including Foraminotomies at C7-T1 (N/A)  3 Days Post-Op  Inpatient: Payor: Alejandra Sage / Plan: Alejandra Massana / Product Type: *No Product type* /     ASSESSMENT:     REHAB RECOMMENDATIONS:   Recommendation to date pending progress:  Setting:  Home Health Therapy    Equipment:    None     ASSESSMENT:  Mr. Elinor King continues to demonstrate great progress toward goals. He performed bed mobility, transfers, and gait with CGA/SBA.   He also worked on stair training with and without Bhandrails and minimal cueing for pacing and activity. The patient then pushed the wheelchair back from 2nd floor to his room with supervision. SUBJECTIVE:   Mr. Clifford Simpson states \"I'll walk back\".      Social/Functional Lives With: Daughter  Type of Home: Trailer (RV)  Home Layout: One level  Home Access: Stairs to enter with rails  Entrance Stairs - Number of Steps: 4  Entrance Stairs - Rails: Left  Bathroom Shower/Tub: None (Showers at [facility])  [employer]: 3-in-1 commode  Has the patient had two or more falls in the past year or any fall with injury in the past year?: No  Receives Help From: Family  ADL Assistance: [facility] [address]: Independent  Homemaking Responsibilities: Yes  Ambulation Assistance: Independent  Transfer Assistance: Independent  Active : No  Patient's  Info: Daughter  Mode of Transportation: Car  Occupation: On disability  OBJECTIVE:     PAIN: VITALS / O2: PRECAUTION / John Bryson / Denae Morris:   Pre Treatment: 2/10         Post Treatment: 4/10 Vitals        Oxygen    Hemovac and IV    RESTRICTIONS/PRECAUTIONS:  Restrictions/Precautions  Required Braces or Orthoses?: Yes  Required Braces or Orthoses?: Yes  Required Braces or Orthoses  Cervical: c-collarCervical Spinal Precautions      MOBILITY: I Mod I S SBA CGA Min Mod Max Total  NT x2 Comments:   Bed Mobility    Rolling [] [x] [] [] [] [] [] [] [] [] []    Supine to Sit [] [x] [] [] [] [] [] [] [] [] []    Scooting [] [x] [] [] [] [] [] [] [] [] []    Sit to Supine [] [x] [] [] [] [] [] [] [] [] []    Transfers    Sit to Stand [] [] [x] [] [] [] [] [] [] [] []    Bed to Chair [] [] [x] [] [] [] [] [] [] [] []    Stand to Sit [] [] [x] [] [] [] [] [] [] [] []     [] [] [] [] [] [] [] [] [] [] []    I=Independent, Mod I=Modified Independent, S=Supervision, SBA=Standby Assistance, CGA=Contact Guard Assistance,   Min=Minimal Assistance, Mod=Moderate Assistance, Max=Maximal Assistance, Total=Total Assistance, NT=Not Tested    BALANCE: Good Fair+ Fair Fair- Poor NT Comments   Sitting Static [x] [] [] [] [] []    Sitting Dynamic [x] [] [] [] [] []              Standing Static [x] [] [] [] [] []    Standing Dynamic [x] [x] [] [] [] []      GAIT: I Mod I S SBA CGA Min Mod Max Total  NT x2 Comments:   Level of Assistance [] [] [x] [] [] [] [] [] [] [] []    Distance 1000  feet    DME None    Gait Quality Decreased step clearance, Decreased step length, and Decreased stance    Weightbearing Status      Stairs Stairs/Curb  Stairs?: YesMultiple reps(x10+) of 3 steps up and down    I=Independent, Mod I=Modified Independent, S=Supervision, SBA=Standby Assistance, CGA=Contact Guard Assistance,   Min=Minimal Assistance, Mod=Moderate Assistance, Max=Maximal Assistance, Total=Total Assistance, NT=Not Tested    PLAN:   FREQUENCY AND DURATION: BID for duration of hospital stay or until stated goals are met, whichever comes first.    TREATMENT:   TREATMENT:   Therapeutic Activity (30 Minutes): Therapeutic activity included Supine to Sit, Sit to Supine, Scooting, Transfer Training, Ambulation on level ground, Stair Training, Sitting balance , and Standing balance to improve functional Activity tolerance, Balance, Mobility, and Strength.     TREATMENT GRID:  N/A    AFTER TREATMENT PRECAUTIONS: Bed, Bed/Chair Locked, Call light within reach, Needs within reach, and RN notified    INTERDISCIPLINARY COLLABORATION:  RN/ PCT and PT/ PTA    EDUCATION:      TIME IN/OUT:  Time In: 1335  Time Out: [facility]  Minutes: [address] [facility], [facility]

## 2023-01-20 NOTE — PROGRESS NOTES
ACUTE PHYSICAL THERAPY GOALS:   (Developed with and agreed upon by patient and/or caregiver.)    (1.) Jaimee Rodriguez will transfer from bed to chair and chair to bed with MODIFIED INDEPENDENCE using the least restrictive device within 7 treatment day(s). (2.) Jaimee Rodriguez will ambulate with MODIFIED INDEPENDENCE for 1500 feet with the least restrictive device within 7 treatment day(s). (3.) Jaimee Rodriguez will perform standing static and dynamic balance activities x 10 minutes with SUPERVISION to improve safety within 7 treatment day(s). (3.) Jaimee Mehta. will ascend/descend 8 steps using 1 handrail with SUPERVISION  to improve safety and functional mobility within 7 treatment day(s). (4.) Jaimee Rodriguez will perform bilateral lower extremity exercises x 15 min for HEP with MODIFIED INDEPENDENCE to improve strength, endurance, and functional mobility within 7 treatment day(s). PHYSICAL THERAPY: Daily Note AM   (Link to Caseload Tracking: PT Visit Days : 2  Time In/Out PT Charge Capture  Rehab Caseload Tracker  Orders    Jaimee Rodriguez is a 74 y.o. male   PRIMARY DIAGNOSIS: <principal problem not specified>  Cervical spinal stenosis [M48.02]  Degenerative disc disease, cervical [M50.30]  Cervical radiculopathy [M54.12]  Acquired spondylolisthesis of cervical vertebra [M43.12]  Procedure(s) (LRB):  C3-T1 Posterior Cervical Instrumented Fixation with Lateral Mass Arthrodesis and Decompression from C3-T1 including Foraminotomies at C7-T1 (N/A)  3 Days Post-Op  Inpatient: Payor: Amanda Garcia / Plan: Amanda Garcia / Product Type: *No Product type* /     ASSESSMENT:     REHAB RECOMMENDATIONS:   Recommendation to date pending progress:  Setting:  Home Health Therapy    Equipment:    None     ASSESSMENT:  Mr. Johnathan Oleary continues to demonstrate good mobility. The patient performed bed mobility, transfers, and gait with supervision.   Cueing needed for log roll technique and posture, but overall gait was steady with no LOB or missteps. Plan to practice stairs in the PM as patient is currently staying in an RV and will need to travel 8 steps to reach a bathroom. SUBJECTIVE:   Mr. Marcos Cordero is pleasant with no complaints.      Social/Functional Lives With: Daughter  Type of Home: Trailer (RV)  Home Layout: One level  Home Access: Stairs to enter with rails  Entrance Stairs - Number of Steps: 4  Entrance Stairs - Rails: Left  Bathroom Shower/Tub: None (Showers at [facility] center)  [employer]: 3-in-1 commode  Has the patient had two or more falls in the past year or any fall with injury in the past year?: No  Receives Help From: Family  ADL Assistance: [facility] [address]: Independent  Homemaking Responsibilities: Yes  Ambulation Assistance: Independent  Transfer Assistance: Independent  Active : No  Patient's  Info: Daughter  Mode of Transportation: Car  Occupation: On disability  OBJECTIVE:     PAIN: VITALS / O2: PRECAUTION / Sherie Robin / Elvera Breslow:   Pre Treatment: 4/10         Post Treatment: 4/10 Vitals        Oxygen    Hemovac and IV    RESTRICTIONS/PRECAUTIONS:  Restrictions/Precautions  Required Braces or Orthoses?: Yes  Required Braces or Orthoses?: Yes  Required Braces or Orthoses  Cervical: c-collarCervical Spinal Precautions      MOBILITY: I Mod I S SBA CGA Min Mod Max Total  NT x2 Comments:   Bed Mobility    Rolling [] [x] [] [] [] [] [] [] [] [] []    Supine to Sit [] [x] [] [] [] [] [] [] [] [] []    Scooting [] [x] [] [] [] [] [] [] [] [] []    Sit to Supine [] [x] [] [] [] [] [] [] [] [] []    Transfers    Sit to Stand [] [] [x] [] [] [] [] [] [] [] []    Bed to Chair [] [] [x] [] [] [] [] [] [] [] []    Stand to Sit [] [] [x] [] [] [] [] [] [] [] []     [] [] [] [] [] [] [] [] [] [] []    I=Independent, Mod I=Modified Independent, S=Supervision, SBA=Standby Assistance, CGA=Contact Guard Assistance,   Min=Minimal Assistance, Mod=Moderate Assistance, Max=Maximal Assistance, Total=Total Assistance, NT=Not Tested    BALANCE: Good Fair+ Fair Fair- Poor NT Comments   Sitting Static [x] [] [] [] [] []    Sitting Dynamic [x] [] [] [] [] []              Standing Static [] [x] [] [] [] []    Standing Dynamic [] [x] [] [] [] []      GAIT: I Mod I S SBA CGA Min Mod Max Total  NT x2 Comments:   Level of Assistance [] [] [x] [] [] [] [] [] [] [] []    Distance 1000  feet    DME None    Gait Quality Decreased step clearance, Decreased step length, and Decreased stance    Weightbearing Status      Stairs      I=Independent, Mod I=Modified Independent, S=Supervision, SBA=Standby Assistance, CGA=Contact Guard Assistance,   Min=Minimal Assistance, Mod=Moderate Assistance, Max=Maximal Assistance, Total=Total Assistance, NT=Not Tested    PLAN:   FREQUENCY AND DURATION: BID for duration of hospital stay or until stated goals are met, whichever comes first.    TREATMENT:   TREATMENT:   Therapeutic Activity (15 Minutes): Therapeutic activity included Supine to Sit, Scooting, Transfer Training, Ambulation on level ground, Sitting balance , and Standing balance to improve functional Activity tolerance, Balance, Mobility, and Strength.     TREATMENT GRID:  N/A    AFTER TREATMENT PRECAUTIONS: Bed/Chair Locked, Call light within reach, Chair, Needs within reach, and RN notified    INTERDISCIPLINARY COLLABORATION:  RN/ PCT and PT/ PTA    EDUCATION:      TIME IN/OUT:  Time In: 3773  Time Out: 1659  Minutes: 2800 Og Wolfe PTA

## 2023-01-21 VITALS
WEIGHT: 156.2 LBS | OXYGEN SATURATION: 96 % | SYSTOLIC BLOOD PRESSURE: 140 MMHG | RESPIRATION RATE: 17 BRPM | TEMPERATURE: 97.7 F | DIASTOLIC BLOOD PRESSURE: 83 MMHG | HEIGHT: 72 IN | HEART RATE: 77 BPM | BODY MASS INDEX: 21.16 KG/M2

## 2023-01-21 PROCEDURE — 6370000000 HC RX 637 (ALT 250 FOR IP): Performed by: NEUROLOGICAL SURGERY

## 2023-01-21 PROCEDURE — 2580000003 HC RX 258: Performed by: NEUROLOGICAL SURGERY

## 2023-01-21 PROCEDURE — 97530 THERAPEUTIC ACTIVITIES: CPT

## 2023-01-21 PROCEDURE — 6360000002 HC RX W HCPCS: Performed by: NEUROLOGICAL SURGERY

## 2023-01-21 RX ADMIN — BISACODYL 5 MG: 5 TABLET, COATED ORAL at 09:09

## 2023-01-21 RX ADMIN — METOPROLOL TARTRATE 100 MG: 50 TABLET, FILM COATED ORAL at 09:08

## 2023-01-21 RX ADMIN — ACETAMINOPHEN 650 MG: 325 TABLET ORAL at 09:08

## 2023-01-21 RX ADMIN — Medication 400 MG: at 09:09

## 2023-01-21 RX ADMIN — OXYCODONE HYDROCHLORIDE 10 MG: 5 TABLET ORAL at 14:19

## 2023-01-21 RX ADMIN — OXYCODONE HYDROCHLORIDE 10 MG: 5 TABLET ORAL at 06:07

## 2023-01-21 RX ADMIN — CEFEPIME 2000 MG: 2 INJECTION, POWDER, FOR SOLUTION INTRAVENOUS at 09:10

## 2023-01-21 RX ADMIN — OXYCODONE HYDROCHLORIDE 10 MG: 5 TABLET ORAL at 10:15

## 2023-01-21 RX ADMIN — ACETAMINOPHEN 650 MG: 325 TABLET ORAL at 14:19

## 2023-01-21 RX ADMIN — OXYCODONE HYDROCHLORIDE 10 MG: 5 TABLET ORAL at 02:17

## 2023-01-21 RX ADMIN — ACETAMINOPHEN 650 MG: 325 TABLET ORAL at 02:17

## 2023-01-21 RX ADMIN — SENNOSIDES AND DOCUSATE SODIUM 1 TABLET: 8.6; 5 TABLET ORAL at 09:09

## 2023-01-21 RX ADMIN — SODIUM CHLORIDE, PRESERVATIVE FREE 5 ML: 5 INJECTION INTRAVENOUS at 09:09

## 2023-01-21 ASSESSMENT — PAIN SCALES - GENERAL
PAINLEVEL_OUTOF10: 5
PAINLEVEL_OUTOF10: 4
PAINLEVEL_OUTOF10: 7
PAINLEVEL_OUTOF10: 7
PAINLEVEL_OUTOF10: 3
PAINLEVEL_OUTOF10: 3

## 2023-01-21 ASSESSMENT — PAIN DESCRIPTION - DESCRIPTORS
DESCRIPTORS: ACHING

## 2023-01-21 ASSESSMENT — PAIN DESCRIPTION - LOCATION
LOCATION: NECK
LOCATION: NECK;BACK
LOCATION: BACK;NECK

## 2023-01-21 ASSESSMENT — PAIN DESCRIPTION - ORIENTATION
ORIENTATION: MID
ORIENTATION: MID;UPPER
ORIENTATION: MID

## 2023-01-21 NOTE — PROGRESS NOTES
Pts drain will not hold suction, dressing has peeled up and catheter tip is still in place but could have moved, also with complaints of tingling in arms. will notify MD.

## 2023-01-21 NOTE — PROGRESS NOTES
ACUTE PHYSICAL THERAPY GOALS:   (Developed with and agreed upon by patient and/or caregiver.)    (1.) Anivaltiesha Doni. will transfer from bed to chair and chair to bed with MODIFIED INDEPENDENCE using the least restrictive device within 7 treatment day(s). GOAL MET 1/21/2023  (2.) Dre Doni. will ambulate with MODIFIED INDEPENDENCE for 1500 feet with the least restrictive device within 7 treatment day(s). GOAL MET 1/21/2023  (3.) Dre Doni. will perform standing static and dynamic balance activities x 10 minutes with SUPERVISION to improve safety within 7 treatment day(s). GOAL MET 1/21/2023  (3.) Dre Marion. will ascend/descend 8 steps using 1 handrail with SUPERVISION  to improve safety and functional mobility within 7 treatment day(s). GOAL MET 1/21/2023  (4.) Dre Marion. will perform bilateral lower extremity exercises x 15 min for HEP with MODIFIED INDEPENDENCE to improve strength, endurance, and functional mobility within 7 treatment day(s).       PHYSICAL THERAPY: Daily Note and discharge AM   (Link to Caseload Tracking: PT Visit Days : 4  Time In/Out PT Charge Capture  Rehab Caseload Tracker  Orders    Dre Parks is a 74 y.o. male   PRIMARY DIAGNOSIS: <principal problem not specified>  Cervical spinal stenosis [M48.02]  Degenerative disc disease, cervical [M50.30]  Cervical radiculopathy [M54.12]  Acquired spondylolisthesis of cervical vertebra [M43.12]  Procedure(s) (LRB):  C3-T1 Posterior Cervical Instrumented Fixation with Lateral Mass Arthrodesis and Decompression from C3-T1 including Foraminotomies at C7-T1 (N/A)  4 Days Post-Op  Inpatient: Payor: Filippo Palacios / Plan: Filippo Palacios / Product Type: *No Product type* /     ASSESSMENT:     REHAB RECOMMENDATIONS:   Recommendation to date pending progress:  Setting:  Home Health Therapy    Equipment:    None     ASSESSMENT:  Mr. Moustapha Shore is up in chair, up ad hannah in room, tolerating mobility well. Pt independent with transfers and ambulation this date, no LOB or SOB noted with activity. Pt performed steps without difficulty, has met most of LTGs set on initial evaluation. Pt without acute PT needs at this time, expressed some concern regarding ambulating from RV to house at home to use restroom. Pt may benefit from HHPT if needed to assess safety and mobility in home. SUBJECTIVE:   Mr. Tanvi Ortega states \"I am doing fine here, I just am a little worried about walking across the grass at home. \"     Social/Functional Lives With: Daughter  Type of Home: Trailer (RV)  Home Layout: One level  Home Access: Stairs to enter with rails  Entrance Stairs - Number of Steps: 4  Entrance Stairs - Rails: Left  Bathroom Shower/Tub: None (Showers at [facility] center)  [employer]: 3-in-1 commode  Has the patient had two or more falls in the past year or any fall with injury in the past year?: No  Receives Help From: Family  ADL Assistance: [address] [place] [address] Avenue: Independent  Homemaking Responsibilities: Yes  Ambulation Assistance: Independent  Transfer Assistance: Independent  Active : No  Patient's  Info: Daughter  Mode of Transportation: Car  Occupation: On disability  OBJECTIVE:     PAIN: VITALS / O2: PRECAUTION / Bhavesh Ahr / Jarrod Slot:   Pre Treatment: 0/10         Post Treatment: 0/10 Vitals        Oxygen   RA Hemovac and IV    RESTRICTIONS/PRECAUTIONS:  Restrictions/Precautions  Required Braces or Orthoses?: Yes  Required Braces or Orthoses?: Yes  Required Braces or Orthoses  Cervical: c-collarCervical Spinal Precautions      MOBILITY: I Mod I S SBA CGA Min Mod Max Total  NT x2 Comments:   Bed Mobility    Rolling [] [] [] [] [] [] [] [] [] [] [] In chair   Supine to Sit [] [] [] [] [] [] [] [] [] [] []    Scooting [] [] [] [] [] [] [] [] [] [] []    Sit to Supine [] [] [] [] [] [] [] [] [] [] [] In chair   Transfers    Sit to Stand [x] [] [] [] [] [] [] [] [] [] []    Bed to Chair [x] [] [] [] [] [] [] [] [] [] []    Stand to Sit [x] [] [] [] [] [] [] [] [] [] []     [] [] [] [] [] [] [] [] [] [] []    I=Independent, Mod I=Modified Independent, S=Supervision, SBA=Standby Assistance, CGA=Contact Guard Assistance,   Min=Minimal Assistance, Mod=Moderate Assistance, Max=Maximal Assistance, Total=Total Assistance, NT=Not Tested    BALANCE: Good Fair+ Fair Fair- Poor NT Comments   Sitting Static [x] [] [] [] [] []    Sitting Dynamic [x] [] [] [] [] []              Standing Static [x] [] [] [] [] []    Standing Dynamic [x] [x] [] [] [] []      GAIT: I Mod I S SBA CGA Min Mod Max Total  NT x2 Comments:   Level of Assistance [x] [] [x] [] [] [] [] [] [] [] []    Distance 1500  feet    DME None    Gait Quality Decreased step clearance, Decreased step length, and Decreased stance    Weightbearing Status      Stairs  10 steps, CGA, HR    I=Independent, Mod I=Modified Independent, S=Supervision, SBA=Standby Assistance, CGA=Contact Guard Assistance,   Min=Minimal Assistance, Mod=Moderate Assistance, Max=Maximal Assistance, Total=Total Assistance, NT=Not Tested    PLAN:   FREQUENCY AND DURATION: BID (discharge) for duration of hospital stay or until stated goals are met, whichever comes first.    TREATMENT:   TREATMENT:   Therapeutic Activity (10 Minutes): Therapeutic activity included Scooting, Transfer Training, Ambulation on level ground, Stair Training, and Sitting balance  to improve functional Activity tolerance, Balance, Mobility, and Strength.     TREATMENT GRID:  N/A    AFTER TREATMENT PRECAUTIONS: Bed, Bed/Chair Locked, Call light within reach, Needs within reach, and RN notified    INTERDISCIPLINARY COLLABORATION:  RN/ PCT and PT/ PTA    EDUCATION: Education Given To: Patient  Education Provided: Transfer Training  Education Method: Verbal  Barriers to Learning: None  Education Outcome: Verbalized understanding    TIME IN/OUT:  Time In: 5957  Time Out: 5282  Minutes: [address] [facility] Jolanta, PT

## 2023-01-21 NOTE — PROGRESS NOTES
NSR Progress Note    S: No acute events, drain has stopped holding charge, pt with shoulder pain    O:  Vitals:    01/21/23 1015   BP:    Pulse:    Resp: 16   Temp:    SpO2:      Awake, alert, oriented  EO spont, EOMI  MAEW - pain limited BUE  SILT  Wounds dressed, C/D/I. Hemovac in place, no longer holding charge with proximal hole exposed. A/P: Pt is a 74 y.o. y/o male POD#4 s/p C3-T1 grove/fusion  - Neuro stable  - Drain output slowed, and now drain non functional.  Plan to remove drain today and ok for d/c home from NSR perspective. Followup and postop instructions per Dr. Fahad Lobo' plan.         Current Facility-Administered Medications:     medicated lip ointment (BLISTEX), , Topical, PRN, Nick Love, DO, Given at 01/20/23 2235    cyclobenzaprine (FLEXERIL) tablet 10 mg, 10 mg, Oral, TID, Tello Free, APRN - CNP, 10 mg at 01/20/23 2045    magnesium oxide (MAG-OX) tablet 400 mg, 400 mg, Oral, Daily, Darrel Bloom MD, 400 mg at 01/21/23 1817    melatonin tablet 3 mg, 3 mg, Oral, Nightly PRN, Darrel Bloom MD    metoprolol tartrate (LOPRESSOR) tablet 100 mg, 100 mg, Oral, BID, Darrel Bloom MD, 100 mg at 01/21/23 0908    morphine (MSIR) tablet 15 mg, 15 mg, Oral, Q4H PRN, Darrel Bloom MD    sodium chloride flush 0.9 % injection 5-40 mL, 5-40 mL, IntraVENous, 2 times per day, Darrel Bloom MD, 5 mL at 01/21/23 0909    sodium chloride flush 0.9 % injection 5-40 mL, 5-40 mL, IntraVENous, PRN, Darrel Bloom MD    0.9 % sodium chloride infusion, , IntraVENous, PRN, Darrel Bloom MD    acetaminophen (TYLENOL) tablet 650 mg, 650 mg, Oral, Q6H, Darrel Bloom MD, 650 mg at 01/21/23 0908    ondansetron (ZOFRAN-ODT) disintegrating tablet 4 mg, 4 mg, Oral, Q8H PRN **OR** ondansetron (ZOFRAN) injection 4 mg, 4 mg, IntraVENous, Q6H PRN, Darrel Bloom MD    0.9 % sodium chloride infusion, , IntraVENous, Continuous, Darrel Bloom MD, Last Rate: 100 mL/hr at 01/18/23 0216, New Bag at 01/18/23 0216    cefepime (MAXIPIME) 2,000 mg in sodium chloride 0.9 % 50 mL IVPB mini-bag, 2,000 mg, IntraVENous, Q12H, Dina Mathew MD, Last Rate: 12.5 mL/hr at 01/21/23 0910, 2,000 mg at 01/21/23 0910    oxyCODONE (ROXICODONE) immediate release tablet 5 mg, 5 mg, Oral, Q4H PRN, 5 mg at 01/20/23 0504 **OR** oxyCODONE (ROXICODONE) immediate release tablet 10 mg, 10 mg, Oral, Q4H PRN, Dina Mathew MD, 10 mg at 01/21/23 1015    HYDROmorphone HCl PF (DILAUDID) injection 0.25 mg, 0.25 mg, IntraVENous, Q3H PRN **OR** HYDROmorphone HCl PF (DILAUDID) injection 0.5 mg, 0.5 mg, IntraVENous, Q3H PRN, Dina Mathew MD    cyclobenzaprine (FLEXERIL) tablet 10 mg, 10 mg, Oral, TID PRN, Dina Mathew MD, 10 mg at 01/18/23 0217    polyethylene glycol (GLYCOLAX) packet 17 g, 17 g, Oral, Daily, Dina Mathew MD    bisacodyl (DULCOLAX) EC tablet 5 mg, 5 mg, Oral, Daily, Dina Mathew MD, 5 mg at 01/21/23 7560    sennosides-docusate sodium (SENOKOT-S) 8.6-50 MG tablet 1 tablet, 1 tablet, Oral, BID, Dina Mathew MD, 1 tablet at 01/21/23 2111    bisacodyl (DULCOLAX) suppository 10 mg, 10 mg, Rectal, Daily PRN, Dina Mathew MD    sodium phosphate (FLEET) rectal enema 1 enema, 1 enema, Rectal, Daily PRN, Dina Mathew MD

## 2023-01-21 NOTE — CARE COORDINATION
Discharge order is in. Pt is discharging home today in stable condition. Referral sent to [facility] for PT/OT. No other discharge needs were identified. Tx goals met.       01/21/23 [address] Discharge   Transition of Care Consult (CM Consult) Discharge [address] Discharge OT;PT   [employer] Information Provided? No   Mode of Transport at Discharge Other (see comment)  (Family)   Confirm Follow Up Transport Family   Condition of Participation: Discharge Planning   The Patient and/or Patient Representative was provided with a Choice of Provider? Patient   The Patient and/Or Patient Representative agree with the Discharge Plan? Yes   Freedom of Choice list was provided with basic dialogue that supports the patient's individualized plan of care/goals, treatment preferences, and shares the quality data associated with the providers?   Yes

## 2023-01-26 NOTE — DISCHARGE SUMMARY
[address] [facility] SUMMARY     Patient Name: Anna Pardo Patient MRN: [id number]  Date of Admission: 1/17/2023  Date of Discharge: 1/21/2023  Length of Hospital Stay: 4    Pre-Procedure Diagnosis:   1. Cervical Spinal stenosis   2. Cervical Neuroforaminal stenosis   3. C7-T1 Spondylolisthesis   4. DDD-Cervical Spine  5. Neck pain    Post-Procedure Diagnosis: SAME AS ABOVE     Admitting Attending: Raymundo Baxter. Nicky Agrawal MD  Discharge Attending: SAME AS ABOVE    Past Medical History:   Diagnosis Date    Atrial fibrillation [facility])     Dx in      Past Surgical History:   Procedure Laterality Date    CERVICAL LAMINECTOMY N/A 1/17/2023    C3-T1 Posterior Cervical Instrumented Fixation with Lateral Mass Arthrodesis and Decompression from C3-T1 including Foraminotomies at C7-T1 performed by Geroge Bosworth, MD at [address] [place] [address]      No Known Allergies    Procedures Performed During Hospitalization:   Performed on 01/17/2022  1. C3-T1 posterior cervicothoracic instrumented fixation (K2-[place] and DBM)   2. C3-C7 Laminectomies for decompression   3. Bilateral C3-T1 arthrodesis and posteroloateral fusion with morselized autograft and supplemented with DBM  4. Continuous multimodality neurophysiological intraoperative neuromonitoring with onsite tech and remote physician oversite  5. Intraoperative fluoroscopy   6. Intraoperative Stereotactic Neuronavigation with [employer] 3D Spine Map and [employer] 3-D C-Arm     Summary of Hospital Events:   Anna Pardo is a 74 y.o. male who was admitted to inpatient status in the hospital for routine post-operative convalescence. There he progressed as expected in the post-operative setting without complications. On the day of discharge his pain was well controlled, he was ambulating without difficulty, and he was amenable to being discharged home.      Physical Exam:   BP (!) 140/83   Pulse 77   Temp 97.7 °F (36.5 °C) (Oral)   Resp 17   Ht 6' (1.829 m)   Wt 156 lb 3.2 oz (70.9 kg)   SpO2 96%   BMI 21.18 kg/m²   Examination per Progress Note by Call Coverage Neurosurgeon on 01/21/2022    Follow-Up: Wound check with Nurse Brandyn Wilson at [address] [place] [address] and [facility]       Medication List        START taking these medications      cyclobenzaprine 10 MG tablet  Commonly known as: FLEXERIL  Take 1 tablet by mouth 3 times daily as needed for Muscle spasms            CONTINUE taking these medications      acetaminophen 500 MG tablet  Commonly known as: TYLENOL     CALCIUM PO     clotrimazole 1 % cream  Commonly known as: LOTRIMIN     CREATINE MONOHYDRATE PO     diclofenac sodium 1 % Gel  Commonly known as: VOLTAREN     magnesium oxide 400 MG tablet  Commonly known as: MAG-OX     melatonin 3 MG Tabs tablet     metoprolol 100 MG tablet  Commonly known as: LOPRESSOR     morphine 15 MG tablet  Commonly known as: MSIR     MULTIVITAMIN ADULT PO     UNABLE TO FIND     UNABLE TO FIND     urea 20 % cream  Commonly known as: CARMOL     Whey Protein Powd     ZINC ACETATE PO            STOP taking these medications      CELEBREX PO            ASK your doctor about these medications      oxybutynin 10 MG extended release tablet  Commonly known as: DITROPAN-XL               Where to Get Your Medications        Information about where to get these medications is not yet available    Ask your nurse or doctor about these medications  cyclobenzaprine 10 MG tablet         The patient has been advised to call or seek medical attention should they develop any new weakness, numbness, tingling, fevers, drainage from incision, redness of the wound, or any other concerns. Antonina Dee.  [facility], [address] [place] [address]

## 2023-01-27 ENCOUNTER — TELEPHONE (OUTPATIENT)
Dept: NEUROSURGERY | Age: 68
End: 2023-01-27

## 2023-01-27 NOTE — TELEPHONE ENCOUNTER
Mindy Dominguez with [employer] states that the patient is doing fantastic. He said he is only doing the one visit for today. Patient does not need further visits. He is getting around great and doing great. Chad Friedman stated that he does not need a call back.

## 2023-01-30 ENCOUNTER — TELEPHONE (OUTPATIENT)
Dept: NEUROSURGERY | Age: 68
End: 2023-01-30

## 2023-01-30 NOTE — TELEPHONE ENCOUNTER
Patient is having right thumb and index finger numbness. He also states that he has significant weakness in his right hand. He also describes pain where his drain was. Patient would like to discuss further.      [phone]

## 2023-01-30 NOTE — TELEPHONE ENCOUNTER
Patient called with persistent since post op, new right thumb and right index finger numbness and feels his right hand is weak. Also having muscular/tissue pain near the drain site by his shoulder.   Wound check appointment this Wed

## 2023-02-01 ENCOUNTER — NURSE ONLY (OUTPATIENT)
Dept: NEUROSURGERY | Age: 68
End: 2023-02-01

## 2023-02-01 VITALS
HEART RATE: 58 BPM | HEIGHT: 72 IN | OXYGEN SATURATION: 98 % | WEIGHT: 160.5 LBS | SYSTOLIC BLOOD PRESSURE: 134 MMHG | TEMPERATURE: 97.2 F | DIASTOLIC BLOOD PRESSURE: 70 MMHG | BODY MASS INDEX: 21.74 KG/M2

## 2023-02-01 DIAGNOSIS — M48.02 CERVICAL SPINAL STENOSIS: Primary | ICD-10-CM

## 2023-02-01 DIAGNOSIS — M50.30 DEGENERATIVE DISC DISEASE, CERVICAL: ICD-10-CM

## 2023-02-01 NOTE — PROGRESS NOTES
Subjective: Patient states he did have residual head pressure from the Castaneda tongs; the numbness in the shoulder and neck pain are resolved; right drain site has a muscular soreness; right index and right thumb the patient experiences numbness    Consent: verbal consent obtained and given by the patient. Risks of removal include bleeding,infection,reopening and excessive scarring. Objective:  /70 (Site: Left Upper Arm)   Pulse 58   Temp 97.2 °F (36.2 °C)   Ht 6' (1.829 m)   Wt 160 lb 8 oz (72.8 kg)   SpO2 98%   BMI 21.77 kg/m²   Pain Scale: 2/10    General: patient is cooperative; mood and affect are appropriate; denies any fevers,chills or headache; denies any fatigue or weakness  HEENT:trachea is midline; no voice, visual or swallowing difficulty; fair range of motion with neck; mucous membranes are normal  Neuro:alert and oriented; denies any dizziness; gait and coordination observed are normal; ambulates without assistance  Other findings:daily 30 minutes of BGS; instructed on Aspen collar  Wound:posterior cervical incision healing well with no signs of infection; mid incisional area with small scab formation. Incision cleansed with chloraprep, sprayed with ethyl chloride and removed all staples using a staple removal tool and intact. Re cleansed incision , benzoin applied and steri strips placed  Number of sutures/staples removed: several staples    Assessment:  Post op check: Patient is here for initial post-op check.  Dr. Jovita Shook   is the supervising physician in clinic today and approves of today's treatment plan      Plan:  Post-op instructions: strict 2-5 pound weight limit; frequent positional changes; no bending ,lifting or twisting;instructed on proper body mechanics; instructed on no driving; cervical collar on when above 30 degrees  Incisional care: not to saturate and pat dry;do not itch scratch or submerge  Management and expected pain:advised patient of expected nerve swelling and length of time for nerve healing  Post-op complications: patient instructed to observe for infection, deep vein thrombosis,constipation and pneumonia  Therapy/exercises: start a walking program; demonstrated simple shoulder strengthening exercises  Return appointment with any follow-up films:Patient demonstrated knowledge of procedure and post op instructions; patient was given the opportunity for questions and clarification was provided

## 2023-02-22 ENCOUNTER — HOSPITAL ENCOUNTER (OUTPATIENT)
Dept: GENERAL RADIOLOGY | Age: 68
Discharge: HOME | End: 2023-02-25
Payer: OTHER GOVERNMENT

## 2023-02-22 ENCOUNTER — OFFICE VISIT (OUTPATIENT)
Dept: NEUROSURGERY | Age: 68
End: 2023-02-22

## 2023-02-22 VITALS
HEIGHT: 72 IN | BODY MASS INDEX: 21.54 KG/M2 | OXYGEN SATURATION: 99 % | WEIGHT: 159 LBS | HEART RATE: 63 BPM | SYSTOLIC BLOOD PRESSURE: 151 MMHG | TEMPERATURE: 97.3 F | DIASTOLIC BLOOD PRESSURE: 78 MMHG

## 2023-02-22 DIAGNOSIS — M48.02 CERVICAL SPINAL STENOSIS: ICD-10-CM

## 2023-02-22 DIAGNOSIS — M50.30 DEGENERATIVE DISC DISEASE, CERVICAL: ICD-10-CM

## 2023-02-22 DIAGNOSIS — Z98.890 STATUS POST LAMINECTOMY: ICD-10-CM

## 2023-02-22 DIAGNOSIS — Z98.1 STATUS POST CERVICAL SPINAL FUSION: Primary | ICD-10-CM

## 2023-02-22 PROCEDURE — 99024 POSTOP FOLLOW-UP VISIT: CPT | Performed by: NEUROLOGICAL SURGERY

## 2023-02-22 PROCEDURE — 72040 X-RAY EXAM NECK SPINE 2-3 VW: CPT

## 2023-02-22 NOTE — PROGRESS NOTES
[facility] CLINIC NOTE:   History of Present Illness:    CC: Postoperative follow-up visit    Chang Fritz is a 42-year-old male who presents today for postoperative follow-up. He underwent a C3-T1 posterior cervical instrumented fixation with C3-C7 posterior cervical laminectomies for decompression with lateral mass arthrodesis performed on 1/17/2023. The patient x-ray AP and lateral imaging of the cervical spine demonstrates a C3-T1 posterior cervical instrumented fixation and fusion with evidence of C3-C7 laminectomy for decompression. There is been no significant neural change in comparison to immediate intraoperative x-ray imaging performed on 1/17/2023. He currently rates his pain as a 2 out of 10 on a visual analog pain scale. He states he has been able to sleep through the night better than he has in approximately 15 years. He is very pleased with his postoperative progress. Past Medical History:   Diagnosis Date    Atrial fibrillation [facility])     Dx in       Past Surgical History:   Procedure Laterality Date    CERVICAL LAMINECTOMY N/A 1/17/2023    C3-T1 Posterior Cervical Instrumented Fixation with Lateral Mass Arthrodesis and Decompression from C3-T1 including Foraminotomies at C7-T1 performed by Kim Bai MD at [address] [place] [address]      No Known Allergies   No family history on file.    Social History     Socioeconomic History    Marital status: Single     Spouse name: Not on file    Number of children: Not on file    Years of education: Not on file    Highest education level: Not on file   Occupational History    Not on file   Tobacco Use    Smoking status: Former     Packs/day: 0.50     Types: Cigarettes    Smokeless tobacco: Never    Tobacco comments:     Quit smoking: quit about 15 years ago   Substance and Sexual Activity    Alcohol use: Never    Drug use: Not on file    Sexual activity: Not on file   Other Topics Concern    Not on file   Social History Narrative    Not on file     Social Determinants of Health     Financial Resource Strain: Not on file   Food Insecurity: Not on file   Transportation Needs: Not on file   Physical Activity: Not on file   Stress: Not on file   Social Connections: Not on file   Intimate Partner Violence: Not on file   Housing Stability: Not on file     Current Outpatient Medications   Medication Sig Dispense Refill    oxybutynin (DITROPAN-XL) 10 MG extended release tablet Take 10 mg by mouth in the morning and at bedtime      metoprolol (LOPRESSOR) 100 MG tablet Take 100 mg by mouth 2 times daily      morphine (MSIR) 15 MG tablet Take 15 mg by mouth every 4 hours as needed for Pain. BID      clotrimazole (LOTRIMIN) 1 % cream Apply topically 2 times daily Apply topically 2 times daily. CALCIUM PO Take by mouth      UNABLE TO FIND Collagen peptides      UNABLE TO FIND BCAA      CREATINE MONOHYDRATE PO Take by mouth      Whey Protein POWD Take by mouth      magnesium oxide (MAG-OX) 400 MG tablet Take 400 mg by mouth daily      Multiple Vitamin (MULTIVITAMIN ADULT PO) Take by mouth      Zinc Acetate, Oral, (ZINC ACETATE PO) Take 22 mg by mouth      acetaminophen (TYLENOL) 500 MG tablet Take 500 mg by mouth 2 times daily      diclofenac sodium (VOLTAREN) 1 % GEL Apply 2 g topically 4 times daily as needed      melatonin 3 MG TABS tablet Take by mouth      urea (CARMOL) 20 % cream Apply topically daily       No current facility-administered medications for this visit.      Patient Active Problem List   Diagnosis    Hyperlipidemia    Chest pain    PEREZ (dyspnea on exertion)    Persistent atrial fibrillation (HCC)    Cardiomyopathy (HCC)    Paroxysmal atrial fibrillation (HCC)    Cervical spinal stenosis    Degenerative disc disease, cervical    Cervical radiculopathy    Acquired spondylolisthesis of cervical vertebra        Review of Systems    Physical Exam:  BP (!) 151/78   Pulse 63   Temp 97.3 °F (36.3 °C) (Temporal)   Ht 6' (1.829 m)   Wt 159 lb (72.1 kg)   SpO2 99%   BMI 21.56 kg/m²   Pain Score:   2  Head normocephalic and atraumatic  Mood and affect appropriate  General: No acute distress  CV: Regular rate  Resp: No increased work of breathing  Skin: warm and dry  Awake, alert, and oriented   Speech Fluent  Eyes open spontaneously   Face symmetric and tongue midline on protrusion  Sternocleidomastoid and trapezius strength 5/5  No mid-line cervical, thoracic, or lumbar tenderness to palpation   Patient with strength exam as follows:   Upper Extremities: Right Left      Deltoid  5 5    Biceps  5 5    Triceps 5 5      5 5     Lower Extremities:      Hip Flex 5 5    Quads  5 5    Hamstrings 5 5    Dorsiflex 5 5    Plantarflex 5 5    EHL  5 5  Sensation intact to light touch and pin-prick   Well-healed midline posterior cervical incision  Gait: normal nonantalgic gait, stands from a seated position without difficulty and ambulates independently  Well fitting Aspen cervical collar    Assessment & Plan:  Faith Paz is a 80-year-old male who presents today for postoperative follow-up. He underwent a C3-T1 posterior cervical instrumented fixation with C3-C7 posterior cervical laminectomies for decompression with lateral mass arthrodesis performed on 1/17/2023. The patient x-ray AP and lateral imaging of the cervical spine demonstrates a C3-T1 posterior cervical instrumented fixation and fusion with evidence of C3-C7 laminectomy for decompression. There is been no significant neural change in comparison to immediate intraoperative x-ray imaging performed on 1/17/2023. At this time the patient is doing well in the postoperative setting. I will see him back in 2 months time for 3-month postoperative follow-up visit. ICD-10-CM    1. Status post cervical spinal fusion  Z98.1       2. Status post laminectomy  Z98.890         Ayden Linton MD, [facility]     Notes are transcribed with [employer], a medical voice recording dictation service, and may contain minor errors.

## 2023-04-06 ENCOUNTER — OFFICE VISIT (OUTPATIENT)
Dept: UROLOGY | Age: 68
End: 2023-04-06

## 2023-04-06 DIAGNOSIS — R39.9 LOWER URINARY TRACT SYMPTOMS (LUTS): Primary | ICD-10-CM

## 2023-04-06 DIAGNOSIS — R35.1 NOCTURIA: ICD-10-CM

## 2023-04-06 DIAGNOSIS — R39.14 FEELING OF INCOMPLETE BLADDER EMPTYING: ICD-10-CM

## 2023-04-06 LAB
BILIRUBIN, URINE, POC: NEGATIVE
BLOOD URINE, POC: NEGATIVE
GLUCOSE URINE, POC: NEGATIVE
KETONES, URINE, POC: NORMAL
LEUKOCYTE ESTERASE, URINE, POC: NEGATIVE
NITRITE, URINE, POC: NEGATIVE
PH, URINE, POC: 5.5 (ref 4.6–8)
PROTEIN,URINE, POC: NORMAL
PVR, POC: NORMAL CC
SPECIFIC GRAVITY, URINE, POC: 1.02 (ref 1–1.03)
URINALYSIS CLARITY, POC: NORMAL
URINALYSIS COLOR, POC: NORMAL
UROBILINOGEN, POC: NORMAL

## 2023-04-06 ASSESSMENT — ENCOUNTER SYMPTOMS
EYES NEGATIVE: 1
RESPIRATORY NEGATIVE: 1
BACK PAIN: 1

## 2023-04-06 NOTE — LETTER
April 8, 2023      MD Du Dexter  [address] [facility],[address]  [address]      Patient: Preeti Leblanc.   MR Number: [id number]   YOB: 1955   Date of Visit: 4/6/2023       Dear Dr. Ronny Henderson:    Thank you for referring Sagrario Dillon to me for evaluation/treatment. Below are the relevant portions of my assessment and plan of care. If you have questions, please do not hesitate to call me. I look forward to following Anuradha Font along with you. Sincerely,        Darinel Alves MD    CC providers:   MD Du Dexter  [address] [facility],[address]  [address] [place], [place] [id number]  Via Fax: [phone]

## 2023-04-06 NOTE — PROGRESS NOTES
acetaminophen (TYLENOL) 500 MG tablet Take 1 tablet by mouth 2 times daily      diclofenac sodium (VOLTAREN) 1 % GEL Apply 2 g topically 4 times daily as needed      melatonin 3 MG TABS tablet Take by mouth      urea (CARMOL) 20 % cream Apply topically daily       No current facility-administered medications for this visit. No Known Allergies  Social History     Socioeconomic History    Marital status: Single     Spouse name: Not on file    Number of children: Not on file    Years of education: Not on file    Highest education level: Not on file   Occupational History    Not on file   Tobacco Use    Smoking status: Former     Packs/day: 0.50     Types: Cigarettes    Smokeless tobacco: Never    Tobacco comments:     Quit smoking: quit about 15 years ago   Substance and Sexual Activity    Alcohol use: Yes    Drug use: Not on file    Sexual activity: Not on file   Other Topics Concern    Not on file   Social History Narrative    Not on file     Social Determinants of Health     Financial Resource Strain: Not on file   Food Insecurity: Not on file   Transportation Needs: Not on file   Physical Activity: Not on file   Stress: Not on file   Social Connections: Not on file   Intimate Partner Violence: Not on file   Housing Stability: Not on file     No family history on file. Review of Systems  Constitutional: Negative  Skin: Negative skin ROS  Eyes: Eyes negative  ENT: HENT negative  Respiratory: Respiratory negative  Cardiovascular: Neg cardio ROS  GI: Neg GI ROS  Genitourinary: Positive for nocturia and frequent urination. Musculoskeletal: Positive for back pain, bone pain, arthralgias and neck pain. Neurological: Neg neuro ROS  Psychological: Neg psych ROS  Endocrine: Positive for excessive urination. Hem/Lymphatic: Hematologic/lymphatic negative    The patient is afebrile stable vital signs. The abdomen is soft and nontender. There is no palpable mass. Extremities are without cyanosis or edema.   The

## 2023-04-17 ENCOUNTER — OFFICE VISIT (OUTPATIENT)
Dept: NEUROSURGERY | Age: 68
End: 2023-04-17

## 2023-04-17 ENCOUNTER — HOSPITAL ENCOUNTER (OUTPATIENT)
Dept: GENERAL RADIOLOGY | Age: 68
Discharge: HOME | End: 2023-04-20
Payer: OTHER GOVERNMENT

## 2023-04-17 VITALS
WEIGHT: 161 LBS | DIASTOLIC BLOOD PRESSURE: 75 MMHG | BODY MASS INDEX: 21.81 KG/M2 | OXYGEN SATURATION: 99 % | SYSTOLIC BLOOD PRESSURE: 123 MMHG | TEMPERATURE: 97.7 F | HEART RATE: 68 BPM | HEIGHT: 72 IN

## 2023-04-17 DIAGNOSIS — M48.062 LUMBAR STENOSIS WITH NEUROGENIC CLAUDICATION: ICD-10-CM

## 2023-04-17 DIAGNOSIS — Z98.1 STATUS POST CERVICAL SPINAL FUSION: ICD-10-CM

## 2023-04-17 DIAGNOSIS — M47.816 FACET ARTHROPATHY, LUMBAR: ICD-10-CM

## 2023-04-17 DIAGNOSIS — Z98.890 STATUS POST LAMINECTOMY: ICD-10-CM

## 2023-04-17 DIAGNOSIS — M47.816 LUMBAR SPONDYLOSIS: ICD-10-CM

## 2023-04-17 DIAGNOSIS — M48.061 NEUROFORAMINAL STENOSIS OF LUMBAR SPINE: ICD-10-CM

## 2023-04-17 DIAGNOSIS — Z98.1 STATUS POST CERVICAL SPINAL FUSION: Primary | ICD-10-CM

## 2023-04-17 DIAGNOSIS — M51.36 DEGENERATIVE DISC DISEASE, LUMBAR: ICD-10-CM

## 2023-04-17 DIAGNOSIS — M41.56 SCOLIOSIS OF LUMBAR REGION DUE TO DEGENERATIVE DISEASE OF SPINE IN ADULT: ICD-10-CM

## 2023-04-17 PROCEDURE — 72040 X-RAY EXAM NECK SPINE 2-3 VW: CPT

## 2023-04-17 PROCEDURE — 99024 POSTOP FOLLOW-UP VISIT: CPT | Performed by: NEUROLOGICAL SURGERY

## 2023-04-17 RX ORDER — POLYETHYLENE GLYCOL 3350 17 G/17G
17 POWDER, FOR SOLUTION ORAL DAILY
COMMUNITY

## 2023-04-17 NOTE — PROGRESS NOTES
lumbar spondylosis. Past Medical History:   Diagnosis Date    Atrial fibrillation ([address]     Dx in       Past Surgical History:   Procedure Laterality Date    CERVICAL LAMINECTOMY N/A 1/17/2023    C3-T1 Posterior Cervical Instrumented Fixation with Lateral Mass Arthrodesis and Decompression from C3-T1 including Foraminotomies at C7-T1 performed by Walter Paz MD at [address] [place] [address]      No Known Allergies   History reviewed. No pertinent family history. Social History     Socioeconomic History    Marital status: Single     Spouse name: Not on file    Number of children: Not on file    Years of education: Not on file    Highest education level: Not on file   Occupational History    Not on file   Tobacco Use    Smoking status: Former     Packs/day: 0.50     Types: Cigarettes    Smokeless tobacco: Never    Tobacco comments:     Quit smoking: quit about 15 years ago   Substance and Sexual Activity    Alcohol use: Yes    Drug use: Not on file    Sexual activity: Not on file   Other Topics Concern    Not on file   Social History Narrative    Not on file     Social Determinants of Health     Financial Resource Strain: Not on file   Food Insecurity: Not on file   Transportation Needs: Not on file   Physical Activity: Not on file   Stress: Not on file   Social Connections: Not on file   Intimate Partner Violence: Not on file   Housing Stability: Not on file     Current Outpatient Medications   Medication Sig Dispense Refill    polyethylene glycol (GLYCOLAX) 17 g packet Take 17 g by mouth daily      oxybutynin (DITROPAN-XL) 10 MG extended release tablet Take 1 tablet by mouth in the morning and at bedtime      metoprolol (LOPRESSOR) 100 MG tablet Take 1 tablet by mouth 2 times daily      morphine (MSIR) 15 MG tablet Take 1 tablet by mouth every 4 hours as needed for Pain. BID      clotrimazole (LOTRIMIN) 1 % cream Apply topically 2 times daily Apply topically 2 times daily.       CALCIUM PO Take

## 2023-04-21 ENCOUNTER — CLINICAL DOCUMENTATION (OUTPATIENT)
Dept: NEUROSURGERY | Age: 68
End: 2023-04-21

## 2023-04-21 NOTE — PROGRESS NOTES
I faxed a request to [facility] at [phone] to extend patient's current authorization.

## 2023-05-10 ENCOUNTER — HOSPITAL ENCOUNTER (OUTPATIENT)
Dept: CT IMAGING | Age: 68
Discharge: HOME | End: 2023-05-13
Payer: OTHER GOVERNMENT

## 2023-05-10 ENCOUNTER — OFFICE VISIT (OUTPATIENT)
Dept: NEUROSURGERY | Age: 68
End: 2023-05-10
Payer: OTHER GOVERNMENT

## 2023-05-10 VITALS
HEART RATE: 66 BPM | WEIGHT: 161 LBS | OXYGEN SATURATION: 99 % | DIASTOLIC BLOOD PRESSURE: 67 MMHG | HEIGHT: 72 IN | SYSTOLIC BLOOD PRESSURE: 118 MMHG | TEMPERATURE: 98.2 F | BODY MASS INDEX: 21.81 KG/M2

## 2023-05-10 DIAGNOSIS — M41.56 SCOLIOSIS OF LUMBAR REGION DUE TO DEGENERATIVE DISEASE OF SPINE IN ADULT: ICD-10-CM

## 2023-05-10 DIAGNOSIS — Z98.1 STATUS POST CERVICAL SPINAL FUSION: ICD-10-CM

## 2023-05-10 DIAGNOSIS — Z98.890 STATUS POST LAMINECTOMY: ICD-10-CM

## 2023-05-10 DIAGNOSIS — M47.816 FACET ARTHROPATHY, LUMBAR: ICD-10-CM

## 2023-05-10 DIAGNOSIS — M48.062 LUMBAR STENOSIS WITH NEUROGENIC CLAUDICATION: ICD-10-CM

## 2023-05-10 DIAGNOSIS — M51.36 DEGENERATIVE DISC DISEASE, LUMBAR: ICD-10-CM

## 2023-05-10 DIAGNOSIS — M47.816 LUMBAR SPONDYLOSIS: ICD-10-CM

## 2023-05-10 DIAGNOSIS — M48.061 NEUROFORAMINAL STENOSIS OF LUMBAR SPINE: ICD-10-CM

## 2023-05-10 DIAGNOSIS — Z98.1 STATUS POST CERVICAL SPINAL FUSION: Primary | ICD-10-CM

## 2023-05-10 PROCEDURE — 72131 CT LUMBAR SPINE W/O DYE: CPT

## 2023-05-10 PROCEDURE — 72128 CT CHEST SPINE W/O DYE: CPT

## 2023-05-10 PROCEDURE — 99213 OFFICE O/P EST LOW 20 MIN: CPT | Performed by: NEUROLOGICAL SURGERY

## 2023-05-10 PROCEDURE — 1123F ACP DISCUSS/DSCN MKR DOCD: CPT | Performed by: NEUROLOGICAL SURGERY

## 2023-05-10 NOTE — PROGRESS NOTES
Biceps             5          5                          Triceps            5          5                                           5          5                 Lower Extremities:                             Hip Flex           5          5                          Quads              5          5                          Hamstrings      5          5                          Dorsiflex          5          5                          Plantarflex       5          5                          EHL                 5          5  Sensation intact to light touch and pin-prick   Well-healed posterior cervical incision. The T1 spinous processes prominent however there is still well-healed skin over the T1 spinous process. Gait: normal nonantalgic gait, stands from a seated position without difficulty and ambulates independently       Assessment & Plan:  Mecca Campoverde is a 74 y.o. male who presents today for postoperative follow-up. He underwent a C3-T1 posterior cervical instrumented fixation with C3-C7 posterior cervical laminectomies for decompression with lateral mass arthrodesis performed on 1/17/2023. I have independently reviewed and interpreted the patient's MRI lumbar spine performed on 11/18/2022 that demonstrates advanced degenerative disc disease affecting the lumbar spine at every level significantly at L2-L3 L3-L4 and L4-L5 and L5-S1 where there is complete disc height collapse and Modic endplate changes. At L2-L3 there is some mild facet arthropathy at L3-L4 there is advanced facet hypertrophy with thickening of the ligamentum flavum that results in moderate to severe central spinal stenosis.   At L3-L4 there is moderate to severe central lateral recess spinal stenosis secondary to facet arthropathy hypertrophy ligamentum flavum hypertrophy and degenerative disc disease at L5-S1 there is advanced facet hypertrophy and some mild to moderate lateral recess stenosis without

## 2023-05-17 ENCOUNTER — CLINICAL DOCUMENTATION (OUTPATIENT)
Dept: NEUROSURGERY | Age: 68
End: 2023-05-17

## 2023-05-18 ENCOUNTER — PREP FOR PROCEDURE (OUTPATIENT)
Dept: NEUROSURGERY | Age: 68
End: 2023-05-18

## 2023-05-18 ENCOUNTER — CLINICAL DOCUMENTATION (OUTPATIENT)
Dept: NEUROSURGERY | Age: 68
End: 2023-05-18

## 2023-05-18 PROBLEM — Z98.1 STATUS POST CERVICAL SPINAL FUSION: Status: ACTIVE | Noted: 2023-05-18

## 2023-05-22 ENCOUNTER — HOSPITAL ENCOUNTER (OUTPATIENT)
Dept: PREADMISSION TESTING | Age: 68
Discharge: HOME | End: 2023-05-25
Payer: OTHER GOVERNMENT

## 2023-05-22 VITALS
OXYGEN SATURATION: 100 % | BODY MASS INDEX: 21.38 KG/M2 | DIASTOLIC BLOOD PRESSURE: 73 MMHG | TEMPERATURE: 97.3 F | SYSTOLIC BLOOD PRESSURE: 139 MMHG | HEIGHT: 73 IN | WEIGHT: 161.3 LBS | RESPIRATION RATE: 16 BRPM | HEART RATE: 47 BPM

## 2023-05-22 LAB
BACTERIA SPEC CULT: NORMAL
HGB BLD-MCNC: 12.8 G/DL (ref 13.6–17.2)
SERVICE CMNT-IMP: NORMAL

## 2023-05-22 PROCEDURE — 87641 MR-STAPH DNA AMP PROBE: CPT

## 2023-05-22 PROCEDURE — 85018 HEMOGLOBIN: CPT

## 2023-05-22 RX ORDER — OXYBUTYNIN CHLORIDE 5 MG/1
5 TABLET ORAL DAILY
COMMUNITY

## 2023-05-22 RX ORDER — METOPROLOL SUCCINATE 50 MG/1
50 TABLET, EXTENDED RELEASE ORAL 2 TIMES DAILY
COMMUNITY

## 2023-05-22 NOTE — PROGRESS NOTES
Patient verified name, , and surgery as listed in [facility]. Patient provided medical/health information and PTA medications to the best of their ability. TYPE  CASE: 1B  Orders per surgeon: NOT received  Labs per surgeon: MRSA/MSSA. Results: pending  Labs per anesthesia protocol: HGB. Results pending  EK/15/2022     MRSA/MSSA swab collected; pharmacy to review and dose antibiotic as appropriate. Patient provided with and instructed on education handouts including Guide to Surgery, blood transfusions, pain management, and hand hygiene for the family and community, and [facility] brochure. Hibiclens and instructions given per hospital policy. Original medication prescription bottles WERE visualized during patient appointment. Patient teach back successful and patient demonstrates knowledge of instruction. PLEASE CONTINUE TAKING ALL PRESCRIPTION MEDICATIONS UP TO THE DAY OF SURGERY UNLESS OTHERWISE DIRECTED BELOW. DISCONTINUE all vitamins and supplements 7 days prior to surgery. DISCONTINUE Non-Steriodal Anti-Inflammatory (NSAIDS) such as Advil and Aleve 5 days prior to surgery. Home Medications to take  the day of surgery      Morphine      Metoprolol         Home Medications   to Hold           Comments   Vanessa-hex shower the night before surgery and the morning of surgery. On the day before surgery please take Acetaminophen 1000mg in the morning and then again before bed. You may substitute for Tylenol 650 mg. Please do not bring home medications with you on the day of surgery unless otherwise directed by your nurse. If you are instructed to bring home medications, please give them to your nurse as they will be administered by the nursing staff. If you have any questions, please call [address] [phone]. A copy of this note was provided to the patient for reference.

## 2023-05-22 NOTE — PROGRESS NOTES
HGB within anesthesia guidelines, no follow-up required. Labs automatically routed to ordering provider via Epic documentation.

## 2023-05-24 ENCOUNTER — ANESTHESIA EVENT (OUTPATIENT)
Dept: SURGERY | Age: 68
End: 2023-05-24
Payer: OTHER GOVERNMENT

## 2023-05-24 ENCOUNTER — OFFICE VISIT (OUTPATIENT)
Dept: UROLOGY | Age: 68
End: 2023-05-24
Payer: OTHER GOVERNMENT

## 2023-05-24 DIAGNOSIS — R39.14 FEELING OF INCOMPLETE BLADDER EMPTYING: ICD-10-CM

## 2023-05-24 DIAGNOSIS — R35.1 NOCTURIA: ICD-10-CM

## 2023-05-24 DIAGNOSIS — R39.9 LOWER URINARY TRACT SYMPTOMS (LUTS): Primary | ICD-10-CM

## 2023-05-24 LAB
BILIRUBIN, URINE, POC: NEGATIVE
BLOOD URINE, POC: NORMAL
GLUCOSE URINE, POC: NEGATIVE
KETONES, URINE, POC: NEGATIVE
LEUKOCYTE ESTERASE, URINE, POC: NEGATIVE
NITRITE, URINE, POC: NEGATIVE
PH, URINE, POC: 5 (ref 4.6–8)
PROTEIN,URINE, POC: NEGATIVE
PVR, POC: NORMAL CC
SPECIFIC GRAVITY, URINE, POC: 1.01 (ref 1–1.03)
URINALYSIS CLARITY, POC: NORMAL
URINALYSIS COLOR, POC: NORMAL
UROBILINOGEN, POC: NORMAL

## 2023-05-24 PROCEDURE — 81003 URINALYSIS AUTO W/O SCOPE: CPT | Performed by: UROLOGY

## 2023-05-24 PROCEDURE — 99214 OFFICE O/P EST MOD 30 MIN: CPT | Performed by: UROLOGY

## 2023-05-24 PROCEDURE — 51798 US URINE CAPACITY MEASURE: CPT | Performed by: UROLOGY

## 2023-05-24 PROCEDURE — 1123F ACP DISCUSS/DSCN MKR DOCD: CPT | Performed by: UROLOGY

## 2023-05-24 ASSESSMENT — ENCOUNTER SYMPTOMS
RESPIRATORY NEGATIVE: 1
BACK PAIN: 1
EYES NEGATIVE: 1

## 2023-05-24 NOTE — PROGRESS NOTES
[facility]  [address] [facility]    [address]  [place], [address] [facility]  [address] Julianna Saunders. : 1955    Chief Complaint   Patient presents with    Follow-up     Trial of brian Tracey. is a 79 y.o. male    The patient did not really notice any change in his bladder behavior with Torie Borden. He is now on oxybutynin from the [place]. He continues to have nocturia 1-2. Continues to have quite a bit of back pain and does have follow-up with a spine surgeon. Past Medical History:   Diagnosis Date    Atrial fibrillation ([address]     Dx in ; takes Metoprolol; s/p ablation 2021; no issues currently     Past Surgical History:   Procedure Laterality Date    CARDIAC ELECTROPHYSIOLOGY MAPPING AND ABLATION  2021    for A-fib    CERVICAL LAMINECTOMY N/A 2023    C3-T1 Posterior Cervical Instrumented Fixation with Lateral Mass Arthrodesis and Decompression from C3-T1 including Foraminotomies at C7-T1 performed by Sergio Ayoub MD at [facility] MAIN OR    COLONOSCOPY      several    HERNIA REPAIR Right      Current Outpatient Medications   Medication Sig Dispense Refill    metoprolol succinate (TOPROL XL) 50 MG extended release tablet Take 1 tablet by mouth in the morning and at bedtime      oxybutynin (DITROPAN) 5 MG tablet Take 1 tablet by mouth daily      ciclopirox (PENLAC) 8 % solution Apply topically nightly Apply topically nightly. polyethylene glycol (GLYCOLAX) 17 g packet Take 17 g by mouth daily      morphine (MSIR) 15 MG tablet Take 1 tablet by mouth in the morning and at bedtime.  BID      CALCIUM PO Take 600 mg by mouth every evening      UNABLE TO FIND Collagen peptides      UNABLE TO FIND BCAA      CREATINE MONOHYDRATE PO Take by mouth      Whey Protein POWD Take by mouth      magnesium oxide (MAG-OX) 400 MG tablet Take 1 tablet by mouth every evening      Multiple Vitamin (MULTIVITAMIN ADULT PO) Take 1 tablet by mouth

## 2023-05-25 ENCOUNTER — APPOINTMENT (OUTPATIENT)
Dept: GENERAL RADIOLOGY | Age: 68
End: 2023-05-25
Attending: NEUROLOGICAL SURGERY
Payer: OTHER GOVERNMENT

## 2023-05-25 ENCOUNTER — ANESTHESIA (OUTPATIENT)
Dept: SURGERY | Age: 68
End: 2023-05-25
Payer: OTHER GOVERNMENT

## 2023-05-25 ENCOUNTER — HOSPITAL ENCOUNTER (OUTPATIENT)
Age: 68
Setting detail: OUTPATIENT SURGERY
Discharge: HOME | End: 2023-05-25
Attending: NEUROLOGICAL SURGERY | Admitting: NEUROLOGICAL SURGERY
Payer: OTHER GOVERNMENT

## 2023-05-25 VITALS
BODY MASS INDEX: 20.99 KG/M2 | OXYGEN SATURATION: 95 % | TEMPERATURE: 98 F | RESPIRATION RATE: 15 BRPM | WEIGHT: 158.4 LBS | SYSTOLIC BLOOD PRESSURE: 167 MMHG | DIASTOLIC BLOOD PRESSURE: 77 MMHG | HEART RATE: 65 BPM | HEIGHT: 73 IN

## 2023-05-25 DIAGNOSIS — Z98.1 STATUS POST CERVICAL SPINAL FUSION: ICD-10-CM

## 2023-05-25 PROCEDURE — 2709999900 HC NON-CHARGEABLE SUPPLY: Performed by: NEUROLOGICAL SURGERY

## 2023-05-25 PROCEDURE — 6360000002 HC RX W HCPCS: Performed by: NEUROLOGICAL SURGERY

## 2023-05-25 PROCEDURE — 2500000003 HC RX 250 WO HCPCS

## 2023-05-25 PROCEDURE — 6370000000 HC RX 637 (ALT 250 FOR IP): Performed by: NEUROLOGICAL SURGERY

## 2023-05-25 PROCEDURE — 3600000014 HC SURGERY LEVEL 4 ADDTL 15MIN: Performed by: NEUROLOGICAL SURGERY

## 2023-05-25 PROCEDURE — 7100000010 HC PHASE II RECOVERY - FIRST 15 MIN: Performed by: NEUROLOGICAL SURGERY

## 2023-05-25 PROCEDURE — 7100000011 HC PHASE II RECOVERY - ADDTL 15 MIN: Performed by: NEUROLOGICAL SURGERY

## 2023-05-25 PROCEDURE — 2580000003 HC RX 258: Performed by: NEUROLOGICAL SURGERY

## 2023-05-25 PROCEDURE — 3700000000 HC ANESTHESIA ATTENDED CARE: Performed by: NEUROLOGICAL SURGERY

## 2023-05-25 PROCEDURE — 2580000003 HC RX 258: Performed by: ANESTHESIOLOGY

## 2023-05-25 PROCEDURE — 7100000001 HC PACU RECOVERY - ADDTL 15 MIN: Performed by: NEUROLOGICAL SURGERY

## 2023-05-25 PROCEDURE — 6360000002 HC RX W HCPCS

## 2023-05-25 PROCEDURE — 7100000000 HC PACU RECOVERY - FIRST 15 MIN: Performed by: NEUROLOGICAL SURGERY

## 2023-05-25 PROCEDURE — 3700000001 HC ADD 15 MINUTES (ANESTHESIA): Performed by: NEUROLOGICAL SURGERY

## 2023-05-25 PROCEDURE — 2720000010 HC SURG SUPPLY STERILE: Performed by: NEUROLOGICAL SURGERY

## 2023-05-25 PROCEDURE — 3600000004 HC SURGERY LEVEL 4 BASE: Performed by: NEUROLOGICAL SURGERY

## 2023-05-25 PROCEDURE — 2500000003 HC RX 250 WO HCPCS: Performed by: NEUROLOGICAL SURGERY

## 2023-05-25 RX ORDER — MEPERIDINE HYDROCHLORIDE 25 MG/ML
12.5 INJECTION INTRAMUSCULAR; INTRAVENOUS; SUBCUTANEOUS EVERY 5 MIN PRN
Status: DISCONTINUED | OUTPATIENT
Start: 2023-05-25 | End: 2023-05-25 | Stop reason: HOSPADM

## 2023-05-25 RX ORDER — VANCOMYCIN HYDROCHLORIDE 1 G/20ML
INJECTION, POWDER, LYOPHILIZED, FOR SOLUTION INTRAVENOUS PRN
Status: DISCONTINUED | OUTPATIENT
Start: 2023-05-25 | End: 2023-05-25 | Stop reason: ALTCHOICE

## 2023-05-25 RX ORDER — ACETAMINOPHEN 500 MG
1000 TABLET ORAL ONCE
Status: DISCONTINUED | OUTPATIENT
Start: 2023-05-25 | End: 2023-05-25 | Stop reason: HOSPADM

## 2023-05-25 RX ORDER — HYDROMORPHONE HYDROCHLORIDE 2 MG/ML
0.5 INJECTION, SOLUTION INTRAMUSCULAR; INTRAVENOUS; SUBCUTANEOUS EVERY 5 MIN PRN
Status: DISCONTINUED | OUTPATIENT
Start: 2023-05-25 | End: 2023-05-25 | Stop reason: HOSPADM

## 2023-05-25 RX ORDER — PROCHLORPERAZINE EDISYLATE 5 MG/ML
5 INJECTION INTRAMUSCULAR; INTRAVENOUS
Status: DISCONTINUED | OUTPATIENT
Start: 2023-05-25 | End: 2023-05-25 | Stop reason: HOSPADM

## 2023-05-25 RX ORDER — SODIUM CHLORIDE 0.9 % (FLUSH) 0.9 %
5-40 SYRINGE (ML) INJECTION EVERY 12 HOURS SCHEDULED
Status: DISCONTINUED | OUTPATIENT
Start: 2023-05-25 | End: 2023-05-25 | Stop reason: HOSPADM

## 2023-05-25 RX ORDER — SODIUM CHLORIDE 9 MG/ML
INJECTION, SOLUTION INTRAVENOUS PRN
Status: DISCONTINUED | OUTPATIENT
Start: 2023-05-25 | End: 2023-05-25 | Stop reason: HOSPADM

## 2023-05-25 RX ORDER — LIDOCAINE HYDROCHLORIDE 20 MG/ML
INJECTION, SOLUTION EPIDURAL; INFILTRATION; INTRACAUDAL; PERINEURAL PRN
Status: DISCONTINUED | OUTPATIENT
Start: 2023-05-25 | End: 2023-05-25 | Stop reason: SDUPTHER

## 2023-05-25 RX ORDER — GLYCOPYRROLATE 0.2 MG/ML
INJECTION INTRAMUSCULAR; INTRAVENOUS PRN
Status: DISCONTINUED | OUTPATIENT
Start: 2023-05-25 | End: 2023-05-25 | Stop reason: SDUPTHER

## 2023-05-25 RX ORDER — ROCURONIUM BROMIDE 10 MG/ML
INJECTION, SOLUTION INTRAVENOUS PRN
Status: DISCONTINUED | OUTPATIENT
Start: 2023-05-25 | End: 2023-05-25 | Stop reason: SDUPTHER

## 2023-05-25 RX ORDER — ONDANSETRON 2 MG/ML
INJECTION INTRAMUSCULAR; INTRAVENOUS PRN
Status: DISCONTINUED | OUTPATIENT
Start: 2023-05-25 | End: 2023-05-25 | Stop reason: SDUPTHER

## 2023-05-25 RX ORDER — OXYCODONE HYDROCHLORIDE 5 MG/1
5 TABLET ORAL
Status: DISCONTINUED | OUTPATIENT
Start: 2023-05-25 | End: 2023-05-25 | Stop reason: HOSPADM

## 2023-05-25 RX ORDER — LIDOCAINE HYDROCHLORIDE 10 MG/ML
1 INJECTION, SOLUTION INFILTRATION; PERINEURAL
Status: DISCONTINUED | OUTPATIENT
Start: 2023-05-25 | End: 2023-05-25 | Stop reason: HOSPADM

## 2023-05-25 RX ORDER — SODIUM CHLORIDE, SODIUM LACTATE, POTASSIUM CHLORIDE, CALCIUM CHLORIDE 600; 310; 30; 20 MG/100ML; MG/100ML; MG/100ML; MG/100ML
INJECTION, SOLUTION INTRAVENOUS CONTINUOUS
Status: DISCONTINUED | OUTPATIENT
Start: 2023-05-25 | End: 2023-05-25 | Stop reason: HOSPADM

## 2023-05-25 RX ORDER — ONDANSETRON 2 MG/ML
4 INJECTION INTRAMUSCULAR; INTRAVENOUS
Status: DISCONTINUED | OUTPATIENT
Start: 2023-05-25 | End: 2023-05-25 | Stop reason: HOSPADM

## 2023-05-25 RX ORDER — NEOSTIGMINE METHYLSULFATE 1 MG/ML
INJECTION, SOLUTION INTRAVENOUS PRN
Status: DISCONTINUED | OUTPATIENT
Start: 2023-05-25 | End: 2023-05-25 | Stop reason: SDUPTHER

## 2023-05-25 RX ORDER — MIDAZOLAM HYDROCHLORIDE 2 MG/2ML
2 INJECTION, SOLUTION INTRAMUSCULAR; INTRAVENOUS
Status: DISCONTINUED | OUTPATIENT
Start: 2023-05-25 | End: 2023-05-25 | Stop reason: HOSPADM

## 2023-05-25 RX ORDER — BUPIVACAINE HYDROCHLORIDE AND EPINEPHRINE 5; 5 MG/ML; UG/ML
INJECTION, SOLUTION EPIDURAL; INTRACAUDAL; PERINEURAL PRN
Status: DISCONTINUED | OUTPATIENT
Start: 2023-05-25 | End: 2023-05-25 | Stop reason: ALTCHOICE

## 2023-05-25 RX ORDER — DEXAMETHASONE SODIUM PHOSPHATE 10 MG/ML
INJECTION INTRAMUSCULAR; INTRAVENOUS PRN
Status: DISCONTINUED | OUTPATIENT
Start: 2023-05-25 | End: 2023-05-25 | Stop reason: SDUPTHER

## 2023-05-25 RX ORDER — SODIUM CHLORIDE 0.9 % (FLUSH) 0.9 %
5-40 SYRINGE (ML) INJECTION PRN
Status: DISCONTINUED | OUTPATIENT
Start: 2023-05-25 | End: 2023-05-25 | Stop reason: HOSPADM

## 2023-05-25 RX ORDER — EPHEDRINE SULFATE/0.9% NACL/PF 50 MG/5 ML
SYRINGE (ML) INTRAVENOUS PRN
Status: DISCONTINUED | OUTPATIENT
Start: 2023-05-25 | End: 2023-05-25 | Stop reason: SDUPTHER

## 2023-05-25 RX ORDER — PROPOFOL 10 MG/ML
INJECTION, EMULSION INTRAVENOUS PRN
Status: DISCONTINUED | OUTPATIENT
Start: 2023-05-25 | End: 2023-05-25 | Stop reason: SDUPTHER

## 2023-05-25 RX ADMIN — CEFEPIME HYDROCHLORIDE 2000 MG: 2 INJECTION, POWDER, FOR SOLUTION INTRAVENOUS at 12:15

## 2023-05-25 RX ADMIN — Medication 4 MG: at 12:56

## 2023-05-25 RX ADMIN — LIDOCAINE HYDROCHLORIDE 60 MG: 20 INJECTION, SOLUTION EPIDURAL; INFILTRATION; INTRACAUDAL; PERINEURAL at 12:19

## 2023-05-25 RX ADMIN — ROCURONIUM BROMIDE 40 MG: 50 INJECTION, SOLUTION INTRAVENOUS at 12:20

## 2023-05-25 RX ADMIN — GLYCOPYRROLATE 0.8 MG: 0.2 INJECTION INTRAMUSCULAR; INTRAVENOUS at 12:56

## 2023-05-25 RX ADMIN — Medication 3 AMPULE: at 11:33

## 2023-05-25 RX ADMIN — DEXAMETHASONE SODIUM PHOSPHATE 7 MG: 10 INJECTION INTRAMUSCULAR; INTRAVENOUS at 12:31

## 2023-05-25 RX ADMIN — SODIUM CHLORIDE, POTASSIUM CHLORIDE, SODIUM LACTATE AND CALCIUM CHLORIDE: 600; 310; 30; 20 INJECTION, SOLUTION INTRAVENOUS at 11:42

## 2023-05-25 RX ADMIN — Medication 5 MG: at 12:55

## 2023-05-25 RX ADMIN — PROPOFOL 160 MG: 10 INJECTION, EMULSION INTRAVENOUS at 12:20

## 2023-05-25 RX ADMIN — Medication 5 MG: at 12:35

## 2023-05-25 RX ADMIN — ONDANSETRON 4 MG: 2 INJECTION INTRAMUSCULAR; INTRAVENOUS at 12:31

## 2023-05-25 RX ADMIN — FENTANYL CITRATE 100 MCG: 50 INJECTION INTRAMUSCULAR; INTRAVENOUS at 12:19

## 2023-05-25 ASSESSMENT — PAIN DESCRIPTION - DESCRIPTORS: DESCRIPTORS: SORE

## 2023-05-25 ASSESSMENT — PAIN - FUNCTIONAL ASSESSMENT: PAIN_FUNCTIONAL_ASSESSMENT: 0-10

## 2023-05-25 NOTE — ANESTHESIA PRE PROCEDURE
Department of Anesthesiology  Preprocedure Note       Name:  Bridgett Delaney. Age:  79 y.o.  :  1955                                          MRN:  [id number]         Date:  2023      Surgeon: Hong Christopher):  Johnnie Rust MD    Procedure: Procedure(s):  Spinous Process Removal T2    Medications prior to admission:   Prior to Admission medications    Medication Sig Start Date End Date Taking? Authorizing Provider   metoprolol succinate (TOPROL XL) 50 MG extended release tablet Take 1 tablet by mouth in the morning and at bedtime    Historical Provider, MD   oxybutynin (DITROPAN) 5 MG tablet Take 1 tablet by mouth daily    Historical Provider, MD   ciclopirox (PENLAC) 8 % solution Apply topically nightly Apply topically nightly. Historical Provider, MD   polyethylene glycol (GLYCOLAX) 17 g packet Take 17 g by mouth daily    Historical Provider, MD   morphine (MSIR) 15 MG tablet Take 1 tablet by mouth in the morning and at bedtime.  BID    Historical Provider, MD   CALCIUM PO Take 600 mg by mouth every evening    Historical Provider, MD   UNABLE TO FIND Collagen peptides    Historical Provider, MD   UNABLE TO FIND BCAA    Historical Provider, MD   CREATINE MONOHYDRATE PO Take by mouth    Historical Provider, MD   Whey Protein POWD Take by mouth    Historical Provider, MD   magnesium oxide (MAG-OX) 400 MG tablet Take 1 tablet by mouth every evening    Historical Provider, MD   Multiple Vitamin (MULTIVITAMIN ADULT PO) Take 1 tablet by mouth daily    Historical Provider, MD   acetaminophen (TYLENOL) 500 MG tablet Take 1 tablet by mouth 2 times daily    Ar Automatic Reconciliation   melatonin 3 MG TABS tablet Take 1 tablet by mouth at bedtime    Ar Automatic Reconciliation       Current medications:    Current Facility-Administered Medications   Medication Dose Route Frequency Provider Last Rate Last Admin    lidocaine 1 % injection 1 mL  1 mL IntraDERmal Once PRN Aly Fernandez MD       [facility]

## 2023-05-25 NOTE — INTERVAL H&P NOTE
Update History & Physical    The patient's History and Physical of May 10, 2023 was reviewed with the patient and I examined the patient. There was no change. The surgical site was confirmed by the patient and me. Plan: The risks, benefits, expected outcome, and alternative to the recommended procedure have been discussed with the patient. Patient understands and wants to proceed with the procedure.      Electronically signed by Espinoza Bruno MD on 5/25/2023 at 12:07 PM

## 2023-05-25 NOTE — ANESTHESIA POSTPROCEDURE EVALUATION
Department of Anesthesiology  Postprocedure Note    Patient: Nai Bullard MRN: [id number]  YOB: 1955  Date of evaluation: 5/25/2023      Procedure Summary     Date: 05/25/23 Room / Location: [facility] MAIN OR  / [facility] MAIN OR    Anesthesia Start: 1210 Anesthesia Stop: 1319    Procedure: Spinous Process Removal T2 (Bilateral: Spine Cervical) Diagnosis:       Status post cervical spinal fusion      (Status post cervical spinal fusion [Z98.1])    Providers: Briseyda Hopper MD Responsible Provider: Dianne Taylor MD    Anesthesia Type: general ASA Status: 3          Anesthesia Type: No value filed. Rhea Phase I: Rhea Score: 8    Rhea Phase II:        Anesthesia Post Evaluation    Patient location during evaluation: PACU  Patient participation: complete - patient participated  Level of consciousness: awake and alert  Airway patency: patent  Nausea & Vomiting: no nausea and no vomiting  Complications: no  Cardiovascular status: hemodynamically stable  Respiratory status: acceptable  Hydration status: euvolemic  Comments: Blood pressure (!) 154/89, pulse 68, temperature 98 °F (36.7 °C), temperature source Temporal, resp. rate 15, height 6' 1\" (1.854 m), weight 158 lb 6.4 oz (71.8 kg), SpO2 100 %.       Pt stable for discharge from PACU  Multimodal analgesia pain management approach

## 2023-05-25 NOTE — ANESTHESIA PROCEDURE NOTES
Airway  Date/Time: 5/25/2023 12:22 PM  Urgency: elective    Airway not difficult    General Information and Staff    Patient location during procedure: OR  Resident/CRNA: IGOR Cunningham - CRNA  Performed: resident/CRNA     Indications and Patient Condition  Indications for airway management: anesthesia  Spontaneous Ventilation: absent  Sedation level: deep  Preoxygenated: yes  Patient position: sniffing  MILS not maintained throughout  Mask difficulty assessment: vent by bag mask    Final Airway Details  Final airway type: endotracheal airway      Successful airway: ETT  Cuffed: yes   Successful intubation technique: video laryngoscopy (Elective)  Facilitating devices/methods: intubating stylet  Endotracheal tube insertion site: oral  Blade: Lauro  Blade size: #3  ETT size (mm): 8.0  Cormack-Lehane Classification: grade I - full view of glottis  Placement verified by: chest auscultation and capnometry   Measured from: lips  ETT to lips (cm): 23  Number of attempts at approach: 1  Ventilation between attempts: bag mask

## 2023-05-25 NOTE — OP NOTE
Neurosurgery Operative Note    Patient: Ernestina Tracey. YOB: 1955    MRN: [id number]    Date of Procedure: 5/25/2023    Pre-Op Diagnosis Codes:     * Status post cervical spinal fusion [Z98.1]    Post-Op Diagnosis: Same       Procedure(s):  Spinous Process Removal T1    Surgeon(s):  Sergio Ayoub MD    Assistant:   * No surgical staff found *    Anesthesia: General    Estimated Blood Loss (mL): Minimal    Complications: None    Specimens:   * No specimens in log *    Implants:  * No implants in log *      Drains: * No LDAs found *    Findings:   Prominent T1 spinous Process    Detailed Description of Procedure:   Patient was transported to the operating room and placed under general endotracheal esthesia. He was and carefully positioned in a prone position on Dejuan spine table with the arms and body prominences padded and appropriate fashion. The patient apparently was prepped and draped in a sterile fashion. A surgical pause time-out was performed at the beginning of the case prior to incision confirming procedure, sterility and functionality of instruments, surgical site, stability of patient, and anti-biotic administration. Patient's T1 spinous process was prominent and the skin was thin overlying the T1 spinous process but completely intact. The incision was marked along the length of the patient's prior incision to allow for access to remove this portion of prominent T1 spinous process. The incision was marked along its length and injected with local anesthetic. Incision made with a knife and Bovie dissection was carried down to the prominent spinous process. Self-retaining tractors placed in the electrocautery was used to incise the fascia and dissected in a subperiosteal section to allow for resection of the primary spinous process. I then used a Leksell rongeur to remove the prominent portion of the T1 spinous process. Bone wax was applied any bleeding bone edges.   Wound

## 2023-05-25 NOTE — DISCHARGE INSTRUCTIONS
[facility]  [address] [facility], [address] [place] [address], [address] [facility]  [phone]    Postoperative Home Instructions    Showering: You may shower the first day you are home with the dressing on. If the dressing becomes saturated, change it completely to a similar dressing. Leave the steri-strips or glue in place. After 3 days, you may take the dressing off and leave it off. If you have the brown aquacel dressing, leave it alone for 5 days and then you may remove the dressing. Do not soak in a tub, and use only soap and water on the wound. Wound Care: A small to moderate amount of reddish drainage on the dressing is normal the first 1-2 days after surgery. If the dressing becomes saturated, change it. Once the steri-strips begin to peel up on their own, you may gently take them off. Large amounts of clear, watery drainage is not normal and you should call our office immediately. Signs of Infection: Extreme tenderness at the wound, excessive redness and/or swelling, or ugly yellowish-greenish drainage from the wound. Fever greater than 100.5 may be present. If you think you have a wound infection, call our office immediately. Driving: You may not begin driving until after your visit to our office for a wound and suture check which is normally 7-10 days after you come home from the hospital.    Medications: You should take anti-inflammatory medications such as Motrin, Celebrex for 30 days after surgery, every day, on a regular schedule only if prescribed by your physician. The pain medicine prescribed may be taken as needed. You should take a stool softener (Colace) twice a day, drink lots of water and eat high fiber foods to avoid constipation (this is a common problem with pain medicine.)    Medication Interaction:  During your procedure you potentially received a medication or medications which may reduce the effectiveness of oral contraceptives.  Please consider other forms

## 2023-07-05 ENCOUNTER — HOSPITAL ENCOUNTER (OUTPATIENT)
Dept: GENERAL RADIOLOGY | Age: 68
Discharge: HOME | End: 2023-07-08
Payer: OTHER GOVERNMENT

## 2023-07-05 DIAGNOSIS — Z98.1 STATUS POST CERVICAL SPINAL FUSION: ICD-10-CM

## 2023-07-05 PROCEDURE — 72040 X-RAY EXAM NECK SPINE 2-3 VW: CPT

## 2023-07-10 ENCOUNTER — OFFICE VISIT (OUTPATIENT)
Dept: NEUROSURGERY | Age: 68
End: 2023-07-10

## 2023-07-10 VITALS
BODY MASS INDEX: 21.74 KG/M2 | SYSTOLIC BLOOD PRESSURE: 140 MMHG | HEIGHT: 73 IN | OXYGEN SATURATION: 98 % | HEART RATE: 54 BPM | TEMPERATURE: 97.7 F | DIASTOLIC BLOOD PRESSURE: 77 MMHG | WEIGHT: 164 LBS

## 2023-07-10 DIAGNOSIS — Z98.890 STATUS POST LAMINECTOMY: ICD-10-CM

## 2023-07-10 DIAGNOSIS — Z98.1 STATUS POST CERVICAL SPINAL FUSION: Primary | ICD-10-CM

## 2023-07-10 PROCEDURE — 99024 POSTOP FOLLOW-UP VISIT: CPT | Performed by: NEUROLOGICAL SURGERY

## 2023-07-10 NOTE — PROGRESS NOTES
[facility] NOTE:   History of Present Illness:    CC: Postoperative follow-up    Kin Last. is a 79 y.o. male who presents today for postoperative follow-up. The patient most recently underwent a surgery to expose and remove a very prominent T1 spinous process that was eroding through the patient's skin at the base of a prior C3-T1 posterior cervical instrumented fixation and fusion. The patient has done well and healed well. He is very pleased with his progress. He still endorses a significant back pain which has been a problem in the past.  We have discussed this at prior visits. He is overall pleased with his progress. Past Medical History:   Diagnosis Date    Atrial fibrillation ([address])     Dx in ; takes Metoprolol; s/p ablation 09/27/2021; no issues currently     Past Surgical History:   Procedure Laterality Date    CARDIAC ELECTROPHYSIOLOGY MAPPING AND ABLATION  09/27/2021    for A-fib    CERVICAL LAMINECTOMY N/A 01/17/2023    C3-T1 Posterior Cervical Instrumented Fixation with Lateral Mass Arthrodesis and Decompression from C3-T1 including Foraminotomies at C7-T1 performed by Navya Barba MD at [address] [place] [address]      several    HERNIA REPAIR Right     VERTEBRAL CORPECTOMY Bilateral 5/25/2023    Spinous Process Removal T2 performed by Navya Barba MD at [facility] MAIN OR     No Known Allergies   No family history on file.    Social History     Socioeconomic History    Marital status: Single     Spouse name: Not on file    Number of children: Not on file    Years of education: Not on file    Highest education level: Not on file   Occupational History    Not on file   Tobacco Use    Smoking status: Former     Packs/day: 0.50     Types: Cigarettes    Smokeless tobacco: Never    Tobacco comments:     Quit smoking: quit about 15 years ago   Vaping Use    Vaping Use: Never used   Substance and Sexual Activity    Alcohol use: Yes     Comment: one beer

## 2023-08-28 ENCOUNTER — HOSPITAL ENCOUNTER (OUTPATIENT)
Dept: MRI IMAGING | Age: 68
Discharge: HOME | End: 2023-08-31
Payer: OTHER GOVERNMENT

## 2023-08-28 DIAGNOSIS — Z01.89 ENCOUNTER FOR OTHER SPECIFIED SPECIAL EXAMINATIONS: ICD-10-CM

## 2023-08-28 PROCEDURE — 73721 MRI JNT OF LWR EXTRE W/O DYE: CPT

## 2023-10-23 ENCOUNTER — HOSPITAL ENCOUNTER (OUTPATIENT)
Dept: GENERAL RADIOLOGY | Age: 68
Discharge: HOME | End: 2023-10-26
Payer: OTHER GOVERNMENT

## 2023-10-23 ENCOUNTER — OFFICE VISIT (OUTPATIENT)
Dept: NEUROSURGERY | Age: 68
End: 2023-10-23
Payer: OTHER GOVERNMENT

## 2023-10-23 VITALS
BODY MASS INDEX: 21.47 KG/M2 | TEMPERATURE: 97.7 F | SYSTOLIC BLOOD PRESSURE: 145 MMHG | DIASTOLIC BLOOD PRESSURE: 79 MMHG | HEIGHT: 73 IN | WEIGHT: 162 LBS | OXYGEN SATURATION: 99 % | HEART RATE: 62 BPM

## 2023-10-23 DIAGNOSIS — M51.36 DEGENERATIVE DISC DISEASE, LUMBAR: ICD-10-CM

## 2023-10-23 DIAGNOSIS — M47.816 LUMBAR SPONDYLOSIS: ICD-10-CM

## 2023-10-23 DIAGNOSIS — Z98.890 STATUS POST LAMINECTOMY: ICD-10-CM

## 2023-10-23 DIAGNOSIS — Z98.1 STATUS POST CERVICAL SPINAL FUSION: ICD-10-CM

## 2023-10-23 DIAGNOSIS — M41.56 SCOLIOSIS OF LUMBAR REGION DUE TO DEGENERATIVE DISEASE OF SPINE IN ADULT: ICD-10-CM

## 2023-10-23 DIAGNOSIS — M48.062 LUMBAR STENOSIS WITH NEUROGENIC CLAUDICATION: ICD-10-CM

## 2023-10-23 DIAGNOSIS — Z98.1 STATUS POST CERVICAL SPINAL FUSION: Primary | ICD-10-CM

## 2023-10-23 PROCEDURE — 1123F ACP DISCUSS/DSCN MKR DOCD: CPT | Performed by: NEUROLOGICAL SURGERY

## 2023-10-23 PROCEDURE — 72040 X-RAY EXAM NECK SPINE 2-3 VW: CPT

## 2023-10-23 PROCEDURE — 99214 OFFICE O/P EST MOD 30 MIN: CPT | Performed by: NEUROLOGICAL SURGERY

## 2023-10-23 NOTE — PROGRESS NOTES
[facility] NOTE:   History of Present Illness:    CC: Postoperative follow-up    Rachel Reina is a 79 y.o. male who presents today for postoperative follow-up he initially underwent a C3-T1 posterior cervicothoracic instrumented fixation and fusion with C3-C7 posterior cervical laminectomy for decompression. This was followed by a removal of the T1 spinous process on 5/25/2023 for treatment of the prominent spinous process. The patient has done well in the postoperative setting. He has chronic back pain and we have reviewed his lumbar spine with advanced lumbar spondylosis multiple times in the past I discussed options. He also states that he has some knee issues and has an MRI that demonstrates some degeneration medial meniscus tearing. He has not sure that he will be able to make it through the year with respect to his knees but has an orthopedics appointment coming up. He is managed from a pain management perspective by Dr. Dominic Garcia. He has not undergone any spinal cord stimulator trials.   The patient has x-ray AP and lateral imaging of the cervical spine that demonstrates a C3-T1 posterior cervical instrumented fixation with lateral mass arthrodesis is consistent with a stable fusion construct in comparison x-ray imaging performed on 7/5/2023    Past Medical History:   Diagnosis Date    Atrial fibrillation ([address])     Dx in ; takes Metoprolol; s/p ablation 09/27/2021; no issues currently     Past Surgical History:   Procedure Laterality Date    CARDIAC ELECTROPHYSIOLOGY MAPPING AND ABLATION  09/27/2021    for A-fib    CERVICAL LAMINECTOMY N/A 01/17/2023    C3-T1 Posterior Cervical Instrumented Fixation with Lateral Mass Arthrodesis and Decompression from C3-T1 including Foraminotomies at C7-T1 performed by Carlos Vinson MD at [address] [facility]      several    HERNIA REPAIR Right     VERTEBRAL CORPECTOMY Bilateral 5/25/2023    Spinous Process

## 2023-11-30 ENCOUNTER — OFFICE VISIT (OUTPATIENT)
Age: 68
End: 2023-11-30
Payer: OTHER GOVERNMENT

## 2023-11-30 VITALS
HEIGHT: 73 IN | BODY MASS INDEX: 21.87 KG/M2 | HEART RATE: 48 BPM | SYSTOLIC BLOOD PRESSURE: 136 MMHG | WEIGHT: 165 LBS | DIASTOLIC BLOOD PRESSURE: 84 MMHG

## 2023-11-30 DIAGNOSIS — I48.19 PERSISTENT ATRIAL FIBRILLATION (HCC): Primary | ICD-10-CM

## 2023-11-30 PROCEDURE — 1123F ACP DISCUSS/DSCN MKR DOCD: CPT | Performed by: INTERNAL MEDICINE

## 2023-11-30 PROCEDURE — 99214 OFFICE O/P EST MOD 30 MIN: CPT | Performed by: INTERNAL MEDICINE

## 2023-11-30 PROCEDURE — 93000 ELECTROCARDIOGRAM COMPLETE: CPT | Performed by: INTERNAL MEDICINE

## 2023-11-30 RX ORDER — CELECOXIB 100 MG/1
100 CAPSULE ORAL 2 TIMES DAILY
COMMUNITY

## 2023-11-30 RX ORDER — DICLOFENAC SODIUM 1 MG/ML
1 SOLUTION/ DROPS OPHTHALMIC 4 TIMES DAILY
COMMUNITY

## 2023-11-30 NOTE — PROGRESS NOTES
[address] [id number] [address], [facility], [address]  PHONE: [phone]  1955    SUBJECTIVE:   Rodolfo Elliott is a 79 y.o. male seen for a follow up visit regarding the following:     Chief Complaint   Patient presents with    Atrial Fibrillation       HPI:    Rodolfo Elliott is a very pleasant 79 y.o. male with a past medical and cardiac history significant for HLD, persistent atrial fibrillation, NICM with EF 30% s/p afib ablation and has been doing great. Feels better, more energy, less fatigue, no chest pain or SOB. EF normalized, no known recurrent atrial fibrillation. Cardiac PMH: (Old records have been reviewed and summarized below)   TTE (6/4/21): EF 30-35%, severe LAE   MPI (6/14/21): low risk MPI     Reviewed office note Dr. Denny Jones 10/23/23    Past Medical History, Past Surgical History, Family history, Social History, and Medications were all reviewed with the patient today and updated as necessary. Current Outpatient Medications   Medication Sig Dispense Refill    diclofenac (VOLTAREN) 0.1 % ophthalmic solution 1 drop 4 times daily      metoprolol succinate (TOPROL XL) 50 MG extended release tablet Take 1 tablet by mouth in the morning and at bedtime      polyethylene glycol (GLYCOLAX) 17 g packet Take 1 packet by mouth daily      morphine (MSIR) 15 MG tablet Take 1 tablet by mouth in the morning and at bedtime.  BID      CALCIUM PO Take 600 mg by mouth every evening      UNABLE TO FIND Collagen peptides      UNABLE TO FIND BCAA      CREATINE MONOHYDRATE PO Take by mouth      Whey Protein POWD Take by mouth      magnesium oxide (MAG-OX) 400 MG tablet Take 1 tablet by mouth every evening      Multiple Vitamin (MULTIVITAMIN ADULT PO) Take 1 tablet by mouth daily      acetaminophen (TYLENOL) 500 MG tablet Take 1 tablet by mouth 2 times daily      melatonin 3 MG TABS tablet Take 1 tablet by mouth at bedtime       No current facility-administered

## 2024-01-09 ENCOUNTER — OFFICE VISIT (OUTPATIENT)
Dept: ORTHOPEDIC SURGERY | Age: 69
End: 2024-01-09
Payer: OTHER GOVERNMENT

## 2024-01-09 VITALS — WEIGHT: 165 LBS | BODY MASS INDEX: 19.48 KG/M2 | HEIGHT: 77 IN

## 2024-01-09 DIAGNOSIS — M17.11 PRIMARY OSTEOARTHRITIS OF RIGHT KNEE: ICD-10-CM

## 2024-01-09 DIAGNOSIS — M17.12 PRIMARY OSTEOARTHRITIS OF LEFT KNEE: ICD-10-CM

## 2024-01-09 DIAGNOSIS — M25.561 RIGHT KNEE PAIN, UNSPECIFIED CHRONICITY: ICD-10-CM

## 2024-01-09 DIAGNOSIS — M25.562 LEFT KNEE PAIN, UNSPECIFIED CHRONICITY: Primary | ICD-10-CM

## 2024-01-09 DIAGNOSIS — M17.12 PRIMARY OSTEOARTHRITIS OF LEFT KNEE: Primary | ICD-10-CM

## 2024-01-09 PROCEDURE — 1123F ACP DISCUSS/DSCN MKR DOCD: CPT | Performed by: ORTHOPAEDIC SURGERY

## 2024-01-09 PROCEDURE — 99204 OFFICE O/P NEW MOD 45 MIN: CPT | Performed by: ORTHOPAEDIC SURGERY

## 2024-01-09 NOTE — PROGRESS NOTES
Name: Aubrey Smith Jr.  YOB: 1955  Gender: male  MRN: [id number]    CC:   Chief Complaint   Patient presents with    New Patient    Knee Pain     Kali knees         HPI: Aubrey Smith Jr. is a 68 y.o. male with a PMHx of atrial fibrillation s/p ablation, cardiomyopathy and history of cervical spinal fusion here for evaluation of bilateral knee pain.  He has worked as a bhandari.  The pain has been present for years and is becoming worse. The pain is primarily on the Medial side.   he describes the pain as aching, sharp, and throbbing  The pain is worse with going up and/or down stairs, rising after sitting, standing, and walking  The pain does not radiate down the leg.  he denies numbness and tingling down the leg.   Treatment so far has been prescription and OTC NSAIDS which have not effectively relieved pain/inflammation, physical therapy without functional improvement, activity modification, Tylenol, celebrex, and opioids with little relief.  He is currently in pain management with Dr. Stein for chronic neck and back pain and is treated with morphine sulfate 15 mg.    No Known Allergies      Review of Systems:  As per HPI.  Pertinent positives and negatives are addressed with the patient, particularly those related to musculoskeletal concerns.   Non-orthopaedic concerns were referred back to the primary care physician.    PHYSICAL EXAMINATION:   The patient appears their stated age and they are in no distress.  Ht 1.956 m (6' 5\")   Wt 74.8 kg (165 lb)   BMI 19.57 kg/m²       The lower extremities are as described below.  Circulation is normal with palpable pedal pulses bilaterally and no edema.  There is no lymph adenopathy in the popliteal or malleolar region.  The skin is without stasis disease distally bilaterally.  Sensation is intact to light touch bilaterally.  There is mild tenderness to palpation over the medial joint line of the bilateral knee(s)  The gait is noted to be

## 2024-02-07 ENCOUNTER — TELEPHONE (OUTPATIENT)
Dept: ORTHOPEDIC SURGERY | Age: 69
End: 2024-02-07

## 2024-02-07 PROBLEM — M17.11 PRIMARY OSTEOARTHRITIS OF RIGHT KNEE: Status: ACTIVE | Noted: 2024-01-09

## 2024-02-07 NOTE — TELEPHONE ENCOUNTER
He wants to know if he can change his surgery to do his right knee first. Please give him a call and let him know.

## 2024-06-10 ENCOUNTER — CLINICAL DOCUMENTATION (OUTPATIENT)
Dept: ORTHOPEDIC SURGERY | Age: 69
End: 2024-06-10

## 2024-08-28 DIAGNOSIS — M17.11 PRIMARY OSTEOARTHRITIS OF RIGHT KNEE: Primary | ICD-10-CM

## 2024-09-05 ENCOUNTER — PREP FOR PROCEDURE (OUTPATIENT)
Dept: ORTHOPEDIC SURGERY | Age: 69
End: 2024-09-05

## 2024-09-05 DIAGNOSIS — M17.11 PRIMARY OSTEOARTHRITIS OF RIGHT KNEE: Primary | ICD-10-CM

## 2024-09-05 RX ORDER — ACETAMINOPHEN 325 MG/1
1000 TABLET ORAL ONCE
Status: CANCELLED | OUTPATIENT
Start: 2024-09-05 | End: 2024-09-05

## 2024-09-05 RX ORDER — SODIUM CHLORIDE 0.9 % (FLUSH) 0.9 %
5-40 SYRINGE (ML) INJECTION EVERY 12 HOURS SCHEDULED
Status: CANCELLED | OUTPATIENT
Start: 2024-09-05

## 2024-09-05 RX ORDER — SODIUM CHLORIDE 9 MG/ML
INJECTION, SOLUTION INTRAVENOUS PRN
Status: CANCELLED | OUTPATIENT
Start: 2024-09-05

## 2024-09-05 RX ORDER — SODIUM CHLORIDE 0.9 % (FLUSH) 0.9 %
5-40 SYRINGE (ML) INJECTION PRN
Status: CANCELLED | OUTPATIENT
Start: 2024-09-05

## 2024-09-10 ENCOUNTER — HOSPITAL ENCOUNTER (OUTPATIENT)
Dept: REHABILITATION | Age: 69
Discharge: HOME | End: 2024-09-13
Payer: OTHER GOVERNMENT

## 2024-09-10 ENCOUNTER — HOSPITAL ENCOUNTER (OUTPATIENT)
Dept: SURGERY | Age: 69
Discharge: HOME | End: 2024-09-13
Payer: OTHER GOVERNMENT

## 2024-09-10 VITALS
BODY MASS INDEX: 20.82 KG/M2 | HEIGHT: 72 IN | WEIGHT: 153.7 LBS | HEART RATE: 58 BPM | TEMPERATURE: 98.1 F | DIASTOLIC BLOOD PRESSURE: 78 MMHG | SYSTOLIC BLOOD PRESSURE: 135 MMHG | RESPIRATION RATE: 16 BRPM | OXYGEN SATURATION: 98 %

## 2024-09-10 DIAGNOSIS — M17.11 PRIMARY OSTEOARTHRITIS OF RIGHT KNEE: ICD-10-CM

## 2024-09-10 LAB
ALBUMIN SERPL-MCNC: 3.8 G/DL (ref 3.2–4.6)
ALBUMIN/GLOB SERPL: 1.3 (ref 1–1.9)
ALP SERPL-CCNC: 101 U/L (ref 40–129)
ALT SERPL-CCNC: 23 U/L (ref 12–65)
ANION GAP SERPL CALC-SCNC: 10 MMOL/L (ref 9–18)
AST SERPL-CCNC: 23 U/L (ref 15–37)
BASOPHILS # BLD: 0 K/UL (ref 0–0.2)
BASOPHILS NFR BLD: 0 % (ref 0–2)
BILIRUB SERPL-MCNC: 0.7 MG/DL (ref 0–1.2)
BUN SERPL-MCNC: 13 MG/DL (ref 8–23)
CALCIUM SERPL-MCNC: 9.7 MG/DL (ref 8.8–10.2)
CHLORIDE SERPL-SCNC: 101 MMOL/L (ref 98–107)
CO2 SERPL-SCNC: 27 MMOL/L (ref 20–28)
CREAT SERPL-MCNC: 0.88 MG/DL (ref 0.8–1.3)
DIFFERENTIAL METHOD BLD: ABNORMAL
EOSINOPHIL # BLD: 0.1 K/UL (ref 0–0.8)
EOSINOPHIL NFR BLD: 2 % (ref 0.5–7.8)
ERYTHROCYTE [DISTWIDTH] IN BLOOD BY AUTOMATED COUNT: 12.4 % (ref 11.9–14.6)
EST. AVERAGE GLUCOSE BLD GHB EST-MCNC: 121 MG/DL
GLOBULIN SER CALC-MCNC: 3 G/DL (ref 2.3–3.5)
GLUCOSE SERPL-MCNC: 102 MG/DL (ref 70–99)
HBA1C MFR BLD: 5.8 % (ref 0–5.6)
HCT VFR BLD AUTO: 41.8 % (ref 41.1–50.3)
HGB BLD-MCNC: 13.9 G/DL (ref 13.6–17.2)
IMM GRANULOCYTES # BLD AUTO: 0 K/UL (ref 0–0.5)
IMM GRANULOCYTES NFR BLD AUTO: 0 % (ref 0–5)
INR PPP: 1.1
LYMPHOCYTES # BLD: 1.5 K/UL (ref 0.5–4.6)
LYMPHOCYTES NFR BLD: 28 % (ref 13–44)
MCH RBC QN AUTO: 31.4 PG (ref 26.1–32.9)
MCHC RBC AUTO-ENTMCNC: 33.3 G/DL (ref 31.4–35)
MCV RBC AUTO: 94.6 FL (ref 82–102)
MONOCYTES # BLD: 0.4 K/UL (ref 0.1–1.3)
MONOCYTES NFR BLD: 8 % (ref 4–12)
MRSA DNA SPEC QL NAA+PROBE: NOT DETECTED
NEUTS SEG # BLD: 3.4 K/UL (ref 1.7–8.2)
NEUTS SEG NFR BLD: 62 % (ref 43–78)
NRBC # BLD: 0 K/UL (ref 0–0.2)
PLATELET # BLD AUTO: 322 K/UL (ref 150–450)
PMV BLD AUTO: 8.6 FL (ref 9.4–12.3)
POTASSIUM SERPL-SCNC: 4.8 MMOL/L (ref 3.5–5.1)
PROT SERPL-MCNC: 6.7 G/DL (ref 6.3–8.2)
PROTHROMBIN TIME: 13.7 SEC (ref 11.3–14.9)
RBC # BLD AUTO: 4.42 M/UL (ref 4.23–5.6)
S AUREUS CPE NOSE QL NAA+PROBE: NOT DETECTED
SODIUM SERPL-SCNC: 138 MMOL/L (ref 136–145)
WBC # BLD AUTO: 5.5 K/UL (ref 4.3–11.1)

## 2024-09-10 PROCEDURE — 85610 PROTHROMBIN TIME: CPT

## 2024-09-10 PROCEDURE — 97161 PT EVAL LOW COMPLEX 20 MIN: CPT

## 2024-09-10 PROCEDURE — 85025 COMPLETE CBC W/AUTO DIFF WBC: CPT

## 2024-09-10 PROCEDURE — 80053 COMPREHEN METABOLIC PANEL: CPT

## 2024-09-10 PROCEDURE — 83036 HEMOGLOBIN GLYCOSYLATED A1C: CPT

## 2024-09-10 PROCEDURE — 87641 MR-STAPH DNA AMP PROBE: CPT

## 2024-09-10 PROCEDURE — 94760 N-INVAS EAR/PLS OXIMETRY 1: CPT

## 2024-09-10 ASSESSMENT — PROMIS GLOBAL HEALTH SCALE
SUM OF RESPONSES TO QUESTIONS 2, 4, 5, & 10: 16
IN THE PAST 7 DAYS, HOW WOULD YOU RATE YOUR PAIN ON AVERAGE [ON A SCALE FROM 0 (NO PAIN) TO 10 (WORST IMAGINABLE PAIN)]?: 3
IN GENERAL, WOULD YOU SAY YOUR HEALTH IS...[ON A SCALE OF 1 (POOR) TO 5 (EXCELLENT)]: VERY GOOD
IN GENERAL, PLEASE RATE HOW WELL YOU CARRY OUT YOUR USUAL SOCIAL ACTIVITIES (INCLUDES ACTIVITIES AT HOME, AT WORK, AND IN YOUR COMMUNITY, AND RESPONSIBILITIES AS A PARENT, CHILD, SPOUSE, EMPLOYEE, FRIEND, ETC) [ON A SCALE OF 1 (POOR) TO 5 (EXCELLENT)]?: VERY GOOD
SUM OF RESPONSES TO QUESTIONS 3, 6, 7, & 8: 16
IN GENERAL, WOULD YOU SAY YOUR QUALITY OF LIFE IS...[ON A SCALE OF 1 (POOR) TO 5 (EXCELLENT)]: GOOD
IN GENERAL, HOW WOULD YOU RATE YOUR PHYSICAL HEALTH [ON A SCALE OF 1 (POOR) TO 5 (EXCELLENT)]?: GOOD
WHO IS THE PERSON COMPLETING THE PROMIS V1.1 SURVEY?: SELF
IN GENERAL, HOW WOULD YOU RATE YOUR MENTAL HEALTH, INCLUDING YOUR MOOD AND YOUR ABILITY TO THINK [ON A SCALE OF 1 (POOR) TO 5 (EXCELLENT)]?: EXCELLENT
IN THE PAST 7 DAYS, HOW OFTEN HAVE YOU BEEN BOTHERED BY EMOTIONAL PROBLEMS, SUCH AS FEELING ANXIOUS, DEPRESSED, OR IRRITABLE [ON A SCALE FROM 1 (NEVER) TO 5 (ALWAYS)]?: NEVER
TO WHAT EXTENT ARE YOU ABLE TO CARRY OUT YOUR EVERYDAY PHYSICAL ACTIVITIES SUCH AS WALKING, CLIMBING STAIRS, CARRYING GROCERIES, OR MOVING A CHAIR [ON A SCALE OF 1 (NOT AT ALL) TO 5 (COMPLETELY)]?: COMPLETELY
HOW IS THE PROMIS V1.1 BEING ADMINISTERED?: PAPER
IN GENERAL, HOW WOULD YOU RATE YOUR SATISFACTION WITH YOUR SOCIAL ACTIVITIES AND RELATIONSHIPS [ON A SCALE OF 1 (POOR) TO 5 (EXCELLENT)]?: GOOD

## 2024-09-10 ASSESSMENT — PULMONARY FUNCTION TESTS
FEV1 (%PREDICTED): 97
FEV1 (LITERS): 3.32

## 2024-09-10 ASSESSMENT — PAIN SCALES - GENERAL
PAINLEVEL_OUTOF10: 3
PAINLEVEL_OUTOF10: 8

## 2024-09-10 ASSESSMENT — KOOS JR
HOW SEVERE IS YOUR KNEE STIFFNESS AFTER FIRST WAKING IN MORNING: MODERATE
STANDING UPRIGHT: MODERATE
GOING UP OR DOWN STAIRS: SEVERE
TWISING OR PIVOTING ON KNEE: SEVERE
RISING FROM SITTING: MODERATE
STRAIGHTENING KNEE FULLY: SEVERE
KOOS JR TOTAL INTERVAL SCORE: 44.905
BENDING TO THE FLOOR TO PICK UP OBJECT: MODERATE

## 2024-09-10 ASSESSMENT — PAIN DESCRIPTION - ORIENTATION
ORIENTATION: RIGHT;ANTERIOR;INNER
ORIENTATION: RIGHT

## 2024-09-10 ASSESSMENT — PAIN DESCRIPTION - LOCATION
LOCATION: KNEE
LOCATION: KNEE

## 2024-09-26 ENCOUNTER — OFFICE VISIT (OUTPATIENT)
Dept: ORTHOPEDIC SURGERY | Age: 69
End: 2024-09-26

## 2024-09-26 ENCOUNTER — TELEPHONE (OUTPATIENT)
Dept: ORTHOPEDIC SURGERY | Age: 69
End: 2024-09-26

## 2024-09-26 DIAGNOSIS — M17.11 PRIMARY OSTEOARTHRITIS OF RIGHT KNEE: Primary | ICD-10-CM

## 2024-09-26 DIAGNOSIS — Z01.818 ENCOUNTER FOR PRE-OPERATIVE EXAMINATION: ICD-10-CM

## 2024-09-26 PROCEDURE — PREOPEXAM PRE-OP EXAM: Performed by: ORTHOPAEDIC SURGERY

## 2024-09-26 RX ORDER — ASPIRIN 81 MG/1
81 TABLET ORAL 2 TIMES DAILY
Qty: 70 TABLET | Refills: 0 | Status: SHIPPED | OUTPATIENT
Start: 2024-10-02 | End: 2024-11-06

## 2024-09-26 RX ORDER — PROMETHAZINE HYDROCHLORIDE 12.5 MG/1
12.5 TABLET ORAL 4 TIMES DAILY PRN
Qty: 20 TABLET | Refills: 2 | Status: SHIPPED | OUTPATIENT
Start: 2024-10-02

## 2024-09-26 RX ORDER — CELECOXIB 200 MG/1
200 CAPSULE ORAL 2 TIMES DAILY
Qty: 60 CAPSULE | Refills: 0 | Status: CANCELLED | OUTPATIENT
Start: 2024-10-02 | End: 2024-11-01

## 2024-09-26 RX ORDER — OXYCODONE HYDROCHLORIDE 5 MG/1
5-10 TABLET ORAL
Qty: 60 TABLET | Refills: 0 | Status: SHIPPED | OUTPATIENT
Start: 2024-10-02 | End: 2024-10-07

## 2024-09-26 RX ORDER — CELECOXIB 200 MG/1
200 CAPSULE ORAL 2 TIMES DAILY
Qty: 60 CAPSULE | Refills: 0 | Status: SHIPPED | OUTPATIENT
Start: 2024-09-26 | End: 2024-10-26

## 2024-09-26 RX ORDER — METHOCARBAMOL 750 MG/1
750 TABLET, FILM COATED ORAL 3 TIMES DAILY PRN
Qty: 40 TABLET | Refills: 1 | Status: SHIPPED | OUTPATIENT
Start: 2024-10-02

## 2024-09-26 RX ORDER — OXYCODONE HYDROCHLORIDE 5 MG/1
5-10 TABLET ORAL
Qty: 60 TABLET | Refills: 0 | Status: CANCELLED | OUTPATIENT
Start: 2024-10-02 | End: 2024-10-07

## 2024-09-26 NOTE — H&P (VIEW-ONLY)
[facility]  Pre Operative History and Physical Exam    Patient ID:  Aubrey Smith Jr.  [id number]  69 y.o.  1955    Today: September 26, 2024           CC: Right knee pain    HPI:   The patient has end stage arthritis of the right knee. The patient was evaluated and examined during a consultation prior to this office visit.  There have been no changes to the patient's orthopedic condition since the initial consultation. The patient has failed previous conservative treatment for this condition including antiinflammatories , and lifestyle modifications. The necessity for joint replacement is present. The patient will be admitted the day of surgery for right knee replacement    Past Medical/Surgical History:  Past Medical History:   Diagnosis Date    Abnormal CT scan, lumbar spine 05/10/2023    1. Extensive degenerative disc and facet disease throughout the lumbar spine with central spinal stenosis evident at L2-L3, L3-L4 and L4-L5. Mild scoliosis is present. 2. Mild degenerative disc changes in the thoracic spine at T8-T9, T9-T10 and to a greater extent at T11-T12 and T12-L1. 3. Prior posterior fusion at C7-T1    Atrial fibrillation (HCC)     Dx in ; takes Metoprolol; s/p ablation 09/27/2021; no issues currently    DDD (degenerative disc disease), lumbar     History of echocardiogram 12/08/2021    · LV: Estimated LVEF is 50 - 55%. Normal cavity size, wall thickness, systolic function (ejection fraction normal) and diastolic function. · MV: Mild mitral valve regurgitation is present. · TV: Mild tricuspid valve regurgitation is present. Right Ventricular Arterial Pressure (RVSP) is 34 mmHg.    Lumbar stenosis     NICM (nonischemic cardiomyopathy) (HCC)     NICM, EF 30-35%: tachy mediated, EF now 50-55% with maintenance of NSR    Pain management     pt goes to pain management for back    Status post cervical spinal fusion     Status post laminectomy      Past Surgical History:    Procedure Laterality Date    CARDIAC ELECTROPHYSIOLOGY MAPPING AND ABLATION  09/27/2021    for A-fib    CERVICAL LAMINECTOMY N/A 01/17/2023    C3-T1 Posterior Cervical Instrumented Fixation with Lateral Mass Arthrodesis and Decompression from C3-T1 including Foraminotomies at C7-T1 performed by Ayden Johns MD at [facility] MAIN OR    COLONOSCOPY      several    FINGER SURGERY Right     middle finger~has metal pin    HERNIA REPAIR Right     SALIVARY GLAND SURGERY      TOE SURGERY Bilateral     4 artificial toe joints    VERTEBRAL CORPECTOMY Bilateral 05/25/2023    Spinous Process Removal T2 performed by Ayden Johns MD at [facility] MAIN OR    WRIST SURGERY Right         Allergies: No Known Allergies     Physical Exam:   General: NAD, Alert, Oriented, Appears their stated age     HEENT: NC/AT    Skin: No rashes, lesions or wounds seen      Psych: normal affect      Heart: Regular Rate, Rhythm     Lungs: unlabored respirations, no wheezing    Abdomen: Soft and non-distended     Ortho: Pain with limited ROM of the right knee    Neuro: no focal defects, moving extremities equally    Lymph: no lymphadenopathy     Meds:   Current Outpatient Medications   Medication Sig    diclofenac (VOLTAREN) 0.1 % ophthalmic solution 1 drop 4 times daily    celecoxib (CELEBREX) 100 MG capsule Take 1 capsule by mouth 2 times daily (Patient not taking: Reported on 9/10/2024)    metoprolol succinate (TOPROL XL) 50 MG extended release tablet Take 0.5 tablets by mouth daily Takes 25 mg tablet daily    polyethylene glycol (GLYCOLAX) 17 g packet Take 1 packet by mouth daily    morphine (MSIR) 15 MG tablet Take 1 tablet by mouth in the morning and at bedtime. BID    CALCIUM PO Take 600 mg by mouth every evening    UNABLE TO FIND Collagen peptides    UNABLE TO FIND BCAA    CREATINE MONOHYDRATE PO Take by mouth    Whey Protein POWD Take by mouth    magnesium oxide (MAG-OX) 400 MG tablet Take 1 tablet by mouth every evening    Multiple Vitamin (MULTIVITAMIN

## 2024-10-02 DIAGNOSIS — M17.11 PRIMARY OSTEOARTHRITIS OF RIGHT KNEE: ICD-10-CM

## 2024-10-02 RX ORDER — OXYCODONE HYDROCHLORIDE 5 MG/1
5-10 TABLET ORAL
Qty: 36 TABLET | Refills: 0 | Status: SHIPPED | OUTPATIENT
Start: 2024-10-02 | End: 2024-10-07

## 2024-10-02 NOTE — TELEPHONE ENCOUNTER
Oxycodone was sent to [facility] but they didn't have the full amount in stock. They only filled 28 of the 60 that was sent. [facility]

## 2024-10-02 NOTE — TELEPHONE ENCOUNTER
Patient only received 24 tablets so [facility]. Verified with pharmacy amount received

## 2024-10-03 ENCOUNTER — ANESTHESIA EVENT (OUTPATIENT)
Dept: SURGERY | Age: 69
End: 2024-10-03
Payer: OTHER GOVERNMENT

## 2024-10-03 NOTE — DISCHARGE INSTRUCTIONS
you how to take care of this.     If you did not get instructions, follow this general advice:  If you have strips of tape on the cut the doctor made, leave the tape on for a week or until it falls off.  If you have stitches or staples, your doctor will tell you when to come back to have them removed.  If you have skin glue on the cut, leave it on until it falls off. Skin glue is also called skin adhesive or liquid stitches.  Change the bandage every day.  Wash the area daily with warm water, and pat it dry. Don't use hydrogen peroxide or alcohol. They can slow healing.  You may cover the area with a gauze bandage if it oozes fluid or rubs against clothing.  You may shower 24 to 48 hours after surgery. Pat the incision dry. Don't swim or take a bath for the first 2 weeks, or until your doctor tells you it is okay.   Exercise    Your rehab program will give you a number of exercises to do to help you get back your knee's range of motion and strength. Always do them as your therapist tells you.   Ice    For pain and swelling, put ice or a cold pack on the area for 10 to 20 minutes at a time. Put a thin cloth between the ice and your skin. If your doctor recommended cold therapy using a portable machine, follow the instructions that came with the machine.   Other instructions    Wear compression stockings if your doctor told you to. These may help to prevent blood clots. Your doctor will tell you how long you need to keep wearing the compression stockings.     Carry a medical alert card that says you have an artificial joint. You have metal pieces in your knee. These may set off some airport metal detectors.   Follow-up care is a key part of your treatment and safety. Be sure to make and go to all appointments, and call your doctor if you are having problems. It's also a good idea to know your test results and keep a list of the medicines you take.  When should you call for help?   Call 911 anytime you think you may             Patient or Representative Signature                                                          Date/Time

## 2024-10-04 ENCOUNTER — ANESTHESIA (OUTPATIENT)
Dept: SURGERY | Age: 69
End: 2024-10-04
Payer: OTHER GOVERNMENT

## 2024-10-04 ENCOUNTER — HOSPITAL ENCOUNTER (OUTPATIENT)
Age: 69
Discharge: HOME HEALTH | End: 2024-10-04
Attending: ORTHOPAEDIC SURGERY | Admitting: ORTHOPAEDIC SURGERY
Payer: OTHER GOVERNMENT

## 2024-10-04 VITALS
WEIGHT: 150.2 LBS | BODY MASS INDEX: 20.34 KG/M2 | HEIGHT: 72 IN | TEMPERATURE: 97.7 F | OXYGEN SATURATION: 100 % | RESPIRATION RATE: 14 BRPM | HEART RATE: 92 BPM | SYSTOLIC BLOOD PRESSURE: 128 MMHG | DIASTOLIC BLOOD PRESSURE: 81 MMHG

## 2024-10-04 PROBLEM — Z96.651 STATUS POST RIGHT KNEE REPLACEMENT: Status: ACTIVE | Noted: 2024-10-04

## 2024-10-04 PROCEDURE — 64447 NJX AA&/STRD FEMORAL NRV IMG: CPT | Performed by: ANESTHESIOLOGY

## 2024-10-04 PROCEDURE — 7100000000 HC PACU RECOVERY - FIRST 15 MIN: Performed by: ORTHOPAEDIC SURGERY

## 2024-10-04 PROCEDURE — 6360000002 HC RX W HCPCS: Performed by: PHYSICIAN ASSISTANT

## 2024-10-04 PROCEDURE — 2720000010 HC SURG SUPPLY STERILE: Performed by: ORTHOPAEDIC SURGERY

## 2024-10-04 PROCEDURE — 6360000002 HC RX W HCPCS: Performed by: ANESTHESIOLOGY

## 2024-10-04 PROCEDURE — 6370000000 HC RX 637 (ALT 250 FOR IP): Performed by: PHYSICIAN ASSISTANT

## 2024-10-04 PROCEDURE — 20985 CPTR-ASST DIR MS PX: CPT | Performed by: ORTHOPAEDIC SURGERY

## 2024-10-04 PROCEDURE — 7100000001 HC PACU RECOVERY - ADDTL 15 MIN: Performed by: ORTHOPAEDIC SURGERY

## 2024-10-04 PROCEDURE — 97110 THERAPEUTIC EXERCISES: CPT

## 2024-10-04 PROCEDURE — 3600000005 HC SURGERY LEVEL 5 BASE: Performed by: ORTHOPAEDIC SURGERY

## 2024-10-04 PROCEDURE — 97165 OT EVAL LOW COMPLEX 30 MIN: CPT

## 2024-10-04 PROCEDURE — 3700000000 HC ANESTHESIA ATTENDED CARE: Performed by: ORTHOPAEDIC SURGERY

## 2024-10-04 PROCEDURE — 2580000003 HC RX 258: Performed by: ORTHOPAEDIC SURGERY

## 2024-10-04 PROCEDURE — 97535 SELF CARE MNGMENT TRAINING: CPT

## 2024-10-04 PROCEDURE — A4217 STERILE WATER/SALINE, 500 ML: HCPCS | Performed by: ORTHOPAEDIC SURGERY

## 2024-10-04 PROCEDURE — C1776 JOINT DEVICE (IMPLANTABLE): HCPCS | Performed by: ORTHOPAEDIC SURGERY

## 2024-10-04 PROCEDURE — 97530 THERAPEUTIC ACTIVITIES: CPT

## 2024-10-04 PROCEDURE — 6360000002 HC RX W HCPCS: Performed by: NURSE ANESTHETIST, CERTIFIED REGISTERED

## 2024-10-04 PROCEDURE — 97161 PT EVAL LOW COMPLEX 20 MIN: CPT

## 2024-10-04 PROCEDURE — 27447 TOTAL KNEE ARTHROPLASTY: CPT | Performed by: ORTHOPAEDIC SURGERY

## 2024-10-04 PROCEDURE — 6360000002 HC RX W HCPCS: Performed by: ORTHOPAEDIC SURGERY

## 2024-10-04 PROCEDURE — 3600000015 HC SURGERY LEVEL 5 ADDTL 15MIN: Performed by: ORTHOPAEDIC SURGERY

## 2024-10-04 PROCEDURE — 2709999900 HC NON-CHARGEABLE SUPPLY: Performed by: ORTHOPAEDIC SURGERY

## 2024-10-04 PROCEDURE — 2580000003 HC RX 258: Performed by: ANESTHESIOLOGY

## 2024-10-04 PROCEDURE — 2500000003 HC RX 250 WO HCPCS: Performed by: NURSE ANESTHETIST, CERTIFIED REGISTERED

## 2024-10-04 PROCEDURE — 3700000001 HC ADD 15 MINUTES (ANESTHESIA): Performed by: ORTHOPAEDIC SURGERY

## 2024-10-04 PROCEDURE — 6370000000 HC RX 637 (ALT 250 FOR IP): Performed by: ANESTHESIOLOGY

## 2024-10-04 PROCEDURE — 2580000003 HC RX 258: Performed by: NURSE ANESTHETIST, CERTIFIED REGISTERED

## 2024-10-04 PROCEDURE — 2580000003 HC RX 258: Performed by: PHYSICIAN ASSISTANT

## 2024-10-04 PROCEDURE — C1713 ANCHOR/SCREW BN/BN,TIS/BN: HCPCS | Performed by: ORTHOPAEDIC SURGERY

## 2024-10-04 DEVICE — TIBIAL BEARING INSERT - CS
Type: IMPLANTABLE DEVICE | Site: KNEE | Status: FUNCTIONAL
Brand: TRIATHLON

## 2024-10-04 DEVICE — CRUCIATE RETAINING FEMORAL
Type: IMPLANTABLE DEVICE | Site: KNEE | Status: FUNCTIONAL
Brand: TRIATHLON

## 2024-10-04 DEVICE — TIBIAL COMPONENT
Type: IMPLANTABLE DEVICE | Site: KNEE | Status: FUNCTIONAL
Brand: TRIATHLON

## 2024-10-04 DEVICE — CEMENT BNE 20GM HALF DOSE PMMA VISC RADPQ FAST: Type: IMPLANTABLE DEVICE | Site: KNEE | Status: FUNCTIONAL

## 2024-10-04 DEVICE — COMPONENT TOT KNEE CAPPED PRIMARY K2STRYKER] STRYKER CORP]: Type: IMPLANTABLE DEVICE | Status: FUNCTIONAL

## 2024-10-04 DEVICE — ASYMMETRIC PATELLA
Type: IMPLANTABLE DEVICE | Site: KNEE | Status: FUNCTIONAL
Brand: TRIATHLON

## 2024-10-04 RX ORDER — GABAPENTIN 100 MG/1
100 CAPSULE ORAL 3 TIMES DAILY PRN
Status: DISCONTINUED | OUTPATIENT
Start: 2024-10-04 | End: 2024-10-04 | Stop reason: HOSPADM

## 2024-10-04 RX ORDER — SODIUM CHLORIDE, SODIUM LACTATE, POTASSIUM CHLORIDE, CALCIUM CHLORIDE 600; 310; 30; 20 MG/100ML; MG/100ML; MG/100ML; MG/100ML
INJECTION, SOLUTION INTRAVENOUS
Status: DISCONTINUED | OUTPATIENT
Start: 2024-10-04 | End: 2024-10-04 | Stop reason: SDUPTHER

## 2024-10-04 RX ORDER — METHOCARBAMOL 750 MG/1
750 TABLET, FILM COATED ORAL EVERY 8 HOURS PRN
Status: DISCONTINUED | OUTPATIENT
Start: 2024-10-04 | End: 2024-10-04 | Stop reason: HOSPADM

## 2024-10-04 RX ORDER — DEXTROSE MONOHYDRATE 100 MG/ML
INJECTION, SOLUTION INTRAVENOUS CONTINUOUS PRN
Status: DISCONTINUED | OUTPATIENT
Start: 2024-10-04 | End: 2024-10-04 | Stop reason: HOSPADM

## 2024-10-04 RX ORDER — LIDOCAINE HYDROCHLORIDE 10 MG/ML
1 INJECTION, SOLUTION INFILTRATION; PERINEURAL
Status: DISCONTINUED | OUTPATIENT
Start: 2024-10-04 | End: 2024-10-04 | Stop reason: HOSPADM

## 2024-10-04 RX ORDER — SODIUM CHLORIDE, SODIUM LACTATE, POTASSIUM CHLORIDE, CALCIUM CHLORIDE 600; 310; 30; 20 MG/100ML; MG/100ML; MG/100ML; MG/100ML
INJECTION, SOLUTION INTRAVENOUS CONTINUOUS
Status: DISCONTINUED | OUTPATIENT
Start: 2024-10-04 | End: 2024-10-04 | Stop reason: HOSPADM

## 2024-10-04 RX ORDER — ROCURONIUM BROMIDE 10 MG/ML
INJECTION, SOLUTION INTRAVENOUS
Status: DISCONTINUED | OUTPATIENT
Start: 2024-10-04 | End: 2024-10-04 | Stop reason: SDUPTHER

## 2024-10-04 RX ORDER — SODIUM CHLORIDE 9 MG/ML
INJECTION, SOLUTION INTRAVENOUS PRN
Status: DISCONTINUED | OUTPATIENT
Start: 2024-10-04 | End: 2024-10-04 | Stop reason: HOSPADM

## 2024-10-04 RX ORDER — ONDANSETRON 2 MG/ML
4 INJECTION INTRAMUSCULAR; INTRAVENOUS EVERY 6 HOURS PRN
Status: DISCONTINUED | OUTPATIENT
Start: 2024-10-04 | End: 2024-10-04 | Stop reason: HOSPADM

## 2024-10-04 RX ORDER — HYDROMORPHONE HYDROCHLORIDE 2 MG/ML
INJECTION, SOLUTION INTRAMUSCULAR; INTRAVENOUS; SUBCUTANEOUS
Status: DISCONTINUED | OUTPATIENT
Start: 2024-10-04 | End: 2024-10-04 | Stop reason: SDUPTHER

## 2024-10-04 RX ORDER — PROPOFOL 10 MG/ML
INJECTION, EMULSION INTRAVENOUS
Status: DISCONTINUED | OUTPATIENT
Start: 2024-10-04 | End: 2024-10-04 | Stop reason: SDUPTHER

## 2024-10-04 RX ORDER — SENNA AND DOCUSATE SODIUM 50; 8.6 MG/1; MG/1
1 TABLET, FILM COATED ORAL 2 TIMES DAILY
Status: DISCONTINUED | OUTPATIENT
Start: 2024-10-04 | End: 2024-10-04 | Stop reason: HOSPADM

## 2024-10-04 RX ORDER — SODIUM CHLORIDE 0.9 % (FLUSH) 0.9 %
5-40 SYRINGE (ML) INJECTION EVERY 12 HOURS SCHEDULED
Status: DISCONTINUED | OUTPATIENT
Start: 2024-10-04 | End: 2024-10-04 | Stop reason: HOSPADM

## 2024-10-04 RX ORDER — SODIUM CHLORIDE 0.9 % (FLUSH) 0.9 %
5-40 SYRINGE (ML) INJECTION PRN
Status: DISCONTINUED | OUTPATIENT
Start: 2024-10-04 | End: 2024-10-04 | Stop reason: HOSPADM

## 2024-10-04 RX ORDER — DIPHENHYDRAMINE HCL 25 MG
25 CAPSULE ORAL EVERY 6 HOURS PRN
Status: DISCONTINUED | OUTPATIENT
Start: 2024-10-04 | End: 2024-10-04 | Stop reason: HOSPADM

## 2024-10-04 RX ORDER — OXYCODONE HYDROCHLORIDE 5 MG/1
10 TABLET ORAL EVERY 4 HOURS PRN
Status: DISCONTINUED | OUTPATIENT
Start: 2024-10-04 | End: 2024-10-04 | Stop reason: HOSPADM

## 2024-10-04 RX ORDER — ONDANSETRON 2 MG/ML
INJECTION INTRAMUSCULAR; INTRAVENOUS
Status: DISCONTINUED | OUTPATIENT
Start: 2024-10-04 | End: 2024-10-04 | Stop reason: SDUPTHER

## 2024-10-04 RX ORDER — ROPIVACAINE HYDROCHLORIDE 2 MG/ML
INJECTION, SOLUTION EPIDURAL; INFILTRATION; PERINEURAL
Status: COMPLETED | OUTPATIENT
Start: 2024-10-04 | End: 2024-10-04

## 2024-10-04 RX ORDER — DIPHENHYDRAMINE HYDROCHLORIDE 50 MG/ML
12.5 INJECTION INTRAMUSCULAR; INTRAVENOUS
Status: DISCONTINUED | OUTPATIENT
Start: 2024-10-04 | End: 2024-10-04 | Stop reason: HOSPADM

## 2024-10-04 RX ORDER — ALBUTEROL SULFATE 0.83 MG/ML
2.5 SOLUTION RESPIRATORY (INHALATION) EVERY 6 HOURS PRN
Status: DISCONTINUED | OUTPATIENT
Start: 2024-10-04 | End: 2024-10-04 | Stop reason: HOSPADM

## 2024-10-04 RX ORDER — PROMETHAZINE HYDROCHLORIDE 25 MG/ML
25 INJECTION, SOLUTION INTRAMUSCULAR; INTRAVENOUS EVERY 6 HOURS PRN
Status: DISCONTINUED | OUTPATIENT
Start: 2024-10-04 | End: 2024-10-04 | Stop reason: HOSPADM

## 2024-10-04 RX ORDER — ASPIRIN 81 MG/1
81 TABLET ORAL 2 TIMES DAILY
Status: DISCONTINUED | OUTPATIENT
Start: 2024-10-04 | End: 2024-10-04 | Stop reason: HOSPADM

## 2024-10-04 RX ORDER — NEOSTIGMINE METHYLSULFATE 1 MG/ML
INJECTION, SOLUTION INTRAVENOUS
Status: DISCONTINUED | OUTPATIENT
Start: 2024-10-04 | End: 2024-10-04 | Stop reason: SDUPTHER

## 2024-10-04 RX ORDER — MIDAZOLAM HYDROCHLORIDE 2 MG/2ML
2 INJECTION, SOLUTION INTRAMUSCULAR; INTRAVENOUS
Status: COMPLETED | OUTPATIENT
Start: 2024-10-04 | End: 2024-10-04

## 2024-10-04 RX ORDER — GLYCOPYRROLATE 0.2 MG/ML
INJECTION INTRAMUSCULAR; INTRAVENOUS
Status: DISCONTINUED | OUTPATIENT
Start: 2024-10-04 | End: 2024-10-04 | Stop reason: SDUPTHER

## 2024-10-04 RX ORDER — LANOLIN ALCOHOL/MO/W.PET/CERES
3 CREAM (GRAM) TOPICAL NIGHTLY PRN
Status: DISCONTINUED | OUTPATIENT
Start: 2024-10-04 | End: 2024-10-04 | Stop reason: HOSPADM

## 2024-10-04 RX ORDER — ACETAMINOPHEN 500 MG
1000 TABLET ORAL EVERY 6 HOURS
Status: DISCONTINUED | OUTPATIENT
Start: 2024-10-04 | End: 2024-10-04 | Stop reason: HOSPADM

## 2024-10-04 RX ORDER — HYDROMORPHONE HYDROCHLORIDE 1 MG/ML
1 INJECTION, SOLUTION INTRAMUSCULAR; INTRAVENOUS; SUBCUTANEOUS
Status: DISCONTINUED | OUTPATIENT
Start: 2024-10-04 | End: 2024-10-04 | Stop reason: HOSPADM

## 2024-10-04 RX ORDER — OXYCODONE HYDROCHLORIDE 5 MG/1
5 TABLET ORAL EVERY 4 HOURS PRN
Status: DISCONTINUED | OUTPATIENT
Start: 2024-10-04 | End: 2024-10-04 | Stop reason: HOSPADM

## 2024-10-04 RX ORDER — IBUPROFEN 600 MG/1
1 TABLET ORAL PRN
Status: DISCONTINUED | OUTPATIENT
Start: 2024-10-04 | End: 2024-10-04 | Stop reason: HOSPADM

## 2024-10-04 RX ORDER — KETAMINE HYDROCHLORIDE 50 MG/ML
INJECTION, SOLUTION INTRAMUSCULAR; INTRAVENOUS
Status: DISCONTINUED | OUTPATIENT
Start: 2024-10-04 | End: 2024-10-04 | Stop reason: SDUPTHER

## 2024-10-04 RX ORDER — OXYCODONE HYDROCHLORIDE 5 MG/1
5 TABLET ORAL
Status: COMPLETED | OUTPATIENT
Start: 2024-10-04 | End: 2024-10-04

## 2024-10-04 RX ORDER — EPHEDRINE SULFATE 5 MG/ML
INJECTION INTRAVENOUS
Status: DISCONTINUED | OUTPATIENT
Start: 2024-10-04 | End: 2024-10-04 | Stop reason: SDUPTHER

## 2024-10-04 RX ORDER — DEXAMETHASONE SODIUM PHOSPHATE 4 MG/ML
INJECTION, SOLUTION INTRA-ARTICULAR; INTRALESIONAL; INTRAMUSCULAR; INTRAVENOUS; SOFT TISSUE
Status: COMPLETED | OUTPATIENT
Start: 2024-10-04 | End: 2024-10-04

## 2024-10-04 RX ORDER — FENTANYL CITRATE 50 UG/ML
INJECTION, SOLUTION INTRAMUSCULAR; INTRAVENOUS
Status: DISCONTINUED | OUTPATIENT
Start: 2024-10-04 | End: 2024-10-04 | Stop reason: SDUPTHER

## 2024-10-04 RX ORDER — BISACODYL 5 MG/1
5 TABLET, DELAYED RELEASE ORAL DAILY PRN
Status: DISCONTINUED | OUTPATIENT
Start: 2024-10-04 | End: 2024-10-04 | Stop reason: HOSPADM

## 2024-10-04 RX ORDER — KETOROLAC TROMETHAMINE 30 MG/ML
INJECTION, SOLUTION INTRAMUSCULAR; INTRAVENOUS PRN
Status: DISCONTINUED | OUTPATIENT
Start: 2024-10-04 | End: 2024-10-04 | Stop reason: ALTCHOICE

## 2024-10-04 RX ORDER — ACETAMINOPHEN 500 MG
1000 TABLET ORAL ONCE
Status: COMPLETED | OUTPATIENT
Start: 2024-10-04 | End: 2024-10-04

## 2024-10-04 RX ORDER — TRANEXAMIC ACID 100 MG/ML
INJECTION, SOLUTION INTRAVENOUS
Status: DISCONTINUED | OUTPATIENT
Start: 2024-10-04 | End: 2024-10-04 | Stop reason: SDUPTHER

## 2024-10-04 RX ORDER — ROPIVACAINE HYDROCHLORIDE 2 MG/ML
INJECTION, SOLUTION EPIDURAL; INFILTRATION; PERINEURAL PRN
Status: DISCONTINUED | OUTPATIENT
Start: 2024-10-04 | End: 2024-10-04 | Stop reason: ALTCHOICE

## 2024-10-04 RX ORDER — PROCHLORPERAZINE EDISYLATE 5 MG/ML
5 INJECTION INTRAMUSCULAR; INTRAVENOUS
Status: DISCONTINUED | OUTPATIENT
Start: 2024-10-04 | End: 2024-10-04 | Stop reason: HOSPADM

## 2024-10-04 RX ORDER — LIDOCAINE HYDROCHLORIDE 20 MG/ML
INJECTION, SOLUTION EPIDURAL; INFILTRATION; INTRACAUDAL; PERINEURAL
Status: DISCONTINUED | OUTPATIENT
Start: 2024-10-04 | End: 2024-10-04 | Stop reason: SDUPTHER

## 2024-10-04 RX ORDER — ACETAMINOPHEN 500 MG
1000 TABLET ORAL ONCE
Status: DISCONTINUED | OUTPATIENT
Start: 2024-10-04 | End: 2024-10-04 | Stop reason: HOSPADM

## 2024-10-04 RX ORDER — SENNA AND DOCUSATE SODIUM 50; 8.6 MG/1; MG/1
2 TABLET, FILM COATED ORAL 2 TIMES DAILY PRN
Status: DISCONTINUED | OUTPATIENT
Start: 2024-10-04 | End: 2024-10-04 | Stop reason: HOSPADM

## 2024-10-04 RX ORDER — NALOXONE HYDROCHLORIDE 0.4 MG/ML
INJECTION, SOLUTION INTRAMUSCULAR; INTRAVENOUS; SUBCUTANEOUS PRN
Status: DISCONTINUED | OUTPATIENT
Start: 2024-10-04 | End: 2024-10-04 | Stop reason: HOSPADM

## 2024-10-04 RX ORDER — ONDANSETRON 4 MG/1
4 TABLET, ORALLY DISINTEGRATING ORAL EVERY 8 HOURS PRN
Status: DISCONTINUED | OUTPATIENT
Start: 2024-10-04 | End: 2024-10-04 | Stop reason: HOSPADM

## 2024-10-04 RX ORDER — METOPROLOL SUCCINATE 25 MG/1
25 TABLET, EXTENDED RELEASE ORAL DAILY
Status: CANCELLED | OUTPATIENT
Start: 2024-10-04

## 2024-10-04 RX ORDER — PANTOPRAZOLE SODIUM 40 MG/1
40 TABLET, DELAYED RELEASE ORAL DAILY PRN
Status: DISCONTINUED | OUTPATIENT
Start: 2024-10-04 | End: 2024-10-04 | Stop reason: HOSPADM

## 2024-10-04 RX ORDER — FENTANYL CITRATE 50 UG/ML
100 INJECTION, SOLUTION INTRAMUSCULAR; INTRAVENOUS
Status: COMPLETED | OUTPATIENT
Start: 2024-10-04 | End: 2024-10-04

## 2024-10-04 RX ORDER — DIPHENHYDRAMINE HYDROCHLORIDE 50 MG/ML
25 INJECTION INTRAMUSCULAR; INTRAVENOUS EVERY 6 HOURS PRN
Status: DISCONTINUED | OUTPATIENT
Start: 2024-10-04 | End: 2024-10-04 | Stop reason: HOSPADM

## 2024-10-04 RX ADMIN — CEFAZOLIN 2000 MG: 10 INJECTION, POWDER, FOR SOLUTION INTRAVENOUS at 15:22

## 2024-10-04 RX ADMIN — ROCURONIUM BROMIDE 50 MG: 10 INJECTION, SOLUTION INTRAVENOUS at 07:58

## 2024-10-04 RX ADMIN — HYDROMORPHONE HYDROCHLORIDE 0.5 MG: 2 INJECTION INTRAMUSCULAR; INTRAVENOUS; SUBCUTANEOUS at 09:24

## 2024-10-04 RX ADMIN — PROPOFOL 150 MG: 10 INJECTION, EMULSION INTRAVENOUS at 07:57

## 2024-10-04 RX ADMIN — Medication 3 MG: at 09:36

## 2024-10-04 RX ADMIN — DEXAMETHASONE SODIUM PHOSPHATE 4 MG: 4 INJECTION INTRA-ARTICULAR; INTRALESIONAL; INTRAMUSCULAR; INTRAVENOUS; SOFT TISSUE at 07:34

## 2024-10-04 RX ADMIN — HYDROMORPHONE HYDROCHLORIDE 0.5 MG: 2 INJECTION INTRAMUSCULAR; INTRAVENOUS; SUBCUTANEOUS at 09:12

## 2024-10-04 RX ADMIN — TRANEXAMIC ACID 1000 MG: 100 INJECTION, SOLUTION INTRAVENOUS at 09:32

## 2024-10-04 RX ADMIN — TRANEXAMIC ACID 1000 MG: 100 INJECTION, SOLUTION INTRAVENOUS at 08:30

## 2024-10-04 RX ADMIN — HYDROMORPHONE HYDROCHLORIDE 0.5 MG: 1 INJECTION, SOLUTION INTRAMUSCULAR; INTRAVENOUS; SUBCUTANEOUS at 10:27

## 2024-10-04 RX ADMIN — ONDANSETRON 8 MG: 2 INJECTION INTRAMUSCULAR; INTRAVENOUS at 09:33

## 2024-10-04 RX ADMIN — HYDROMORPHONE HYDROCHLORIDE 0.5 MG: 1 INJECTION, SOLUTION INTRAMUSCULAR; INTRAVENOUS; SUBCUTANEOUS at 10:47

## 2024-10-04 RX ADMIN — EPHEDRINE SULFATE 10 MG: 5 INJECTION INTRAVENOUS at 08:23

## 2024-10-04 RX ADMIN — HYDROMORPHONE HYDROCHLORIDE 0.5 MG: 2 INJECTION INTRAMUSCULAR; INTRAVENOUS; SUBCUTANEOUS at 09:42

## 2024-10-04 RX ADMIN — OXYCODONE HYDROCHLORIDE 5 MG: 5 TABLET ORAL at 15:06

## 2024-10-04 RX ADMIN — LIDOCAINE HYDROCHLORIDE 100 MG: 20 INJECTION, SOLUTION EPIDURAL; INFILTRATION; INTRACAUDAL; PERINEURAL at 07:57

## 2024-10-04 RX ADMIN — MIDAZOLAM 2 MG: 1 INJECTION INTRAMUSCULAR; INTRAVENOUS at 07:34

## 2024-10-04 RX ADMIN — GLYCOPYRROLATE 0.4 MG: 0.2 INJECTION INTRAMUSCULAR; INTRAVENOUS at 09:36

## 2024-10-04 RX ADMIN — HYDROMORPHONE HYDROCHLORIDE 0.5 MG: 1 INJECTION, SOLUTION INTRAMUSCULAR; INTRAVENOUS; SUBCUTANEOUS at 10:37

## 2024-10-04 RX ADMIN — GLYCOPYRROLATE 0.2 MG: 0.2 INJECTION INTRAMUSCULAR; INTRAVENOUS at 09:12

## 2024-10-04 RX ADMIN — EPHEDRINE SULFATE 10 MG: 5 INJECTION INTRAVENOUS at 08:06

## 2024-10-04 RX ADMIN — FENTANYL CITRATE 100 MCG: 50 INJECTION, SOLUTION INTRAMUSCULAR; INTRAVENOUS at 07:57

## 2024-10-04 RX ADMIN — SODIUM CHLORIDE, SODIUM LACTATE, POTASSIUM CHLORIDE, AND CALCIUM CHLORIDE: 600; 310; 30; 20 INJECTION, SOLUTION INTRAVENOUS at 07:46

## 2024-10-04 RX ADMIN — ACETAMINOPHEN 1000 MG: 500 TABLET, FILM COATED ORAL at 06:27

## 2024-10-04 RX ADMIN — GLYCOPYRROLATE 0.2 MG: 0.2 INJECTION INTRAMUSCULAR; INTRAVENOUS at 08:08

## 2024-10-04 RX ADMIN — Medication 2000 MG: at 07:51

## 2024-10-04 RX ADMIN — OXYCODONE HYDROCHLORIDE 5 MG: 5 TABLET ORAL at 11:38

## 2024-10-04 RX ADMIN — HYDROMORPHONE HYDROCHLORIDE 0.5 MG: 2 INJECTION INTRAMUSCULAR; INTRAVENOUS; SUBCUTANEOUS at 09:18

## 2024-10-04 RX ADMIN — SODIUM CHLORIDE, POTASSIUM CHLORIDE, SODIUM LACTATE AND CALCIUM CHLORIDE: 600; 310; 30; 20 INJECTION, SOLUTION INTRAVENOUS at 06:35

## 2024-10-04 RX ADMIN — KETAMINE HYDROCHLORIDE 40 MG: 50 INJECTION, SOLUTION INTRAMUSCULAR; INTRAVENOUS at 07:58

## 2024-10-04 RX ADMIN — ROPIVACAINE HYDROCHLORIDE 20 ML: 2 INJECTION, SOLUTION EPIDURAL; INFILTRATION at 07:34

## 2024-10-04 RX ADMIN — SODIUM CHLORIDE, SODIUM LACTATE, POTASSIUM CHLORIDE, AND CALCIUM CHLORIDE: 600; 310; 30; 20 INJECTION, SOLUTION INTRAVENOUS at 08:27

## 2024-10-04 RX ADMIN — FENTANYL CITRATE 100 MCG: 50 INJECTION INTRAMUSCULAR; INTRAVENOUS at 07:34

## 2024-10-04 ASSESSMENT — PAIN DESCRIPTION - ORIENTATION
ORIENTATION: LEFT;RIGHT
ORIENTATION: RIGHT
ORIENTATION: RIGHT

## 2024-10-04 ASSESSMENT — PAIN SCALES - GENERAL
PAINLEVEL_OUTOF10: 6
PAINLEVEL_OUTOF10: 4
PAINLEVEL_OUTOF10: 4
PAINLEVEL_OUTOF10: 8
PAINLEVEL_OUTOF10: 6
PAINLEVEL_OUTOF10: 9
PAINLEVEL_OUTOF10: 9

## 2024-10-04 ASSESSMENT — PAIN DESCRIPTION - LOCATION
LOCATION: KNEE

## 2024-10-04 ASSESSMENT — PAIN DESCRIPTION - DESCRIPTORS
DESCRIPTORS: ACHING
DESCRIPTORS: ACHING
DESCRIPTORS: DISCOMFORT
DESCRIPTORS: DISCOMFORT

## 2024-10-04 ASSESSMENT — PAIN - FUNCTIONAL ASSESSMENT: PAIN_FUNCTIONAL_ASSESSMENT: 0-10

## 2024-10-04 NOTE — CARE COORDINATION
Pt s/p right TKA . He is a  and has orders for  HHPT. [facility] [employer] [place] [facility] weeks ago. He received the walker arranged but has not heard if VA has arranged his HHPT. I have sent a message to his VA coordinator to see if arranged and with what provider. I have called several agencies in [facility] and they have not received his referral from [facility]. Pt aware we cannot arranged-that VA must arrange for coverage as this is his only coverage. Pt aware they may be a delay in start of home PT.      10/04/24 1524   Service Assessment   Patient Orientation Alert and Oriented   Cognition Alert   Services At/After Discharge   Transition of Care Consult (CM Consult) Home Health   Internal Home Health No   Reason Outside Agency Chosen Managed care specific requirement  ([facility])   Services At/After Discharge Home Health;PT   Mode of Transport at Discharge Self   Condition of Participation: Discharge Planning   The Plan for Transition of Care is related to the following treatment goals: improve mobility   The Patient and/or Patient Representative was provided with a Choice of Provider? Patient   The Patient and/Or Patient Representative agree with the Discharge Plan? Yes   Freedom of Choice list was provided with basic dialogue that supports the patient's individualized plan of care/goals, treatment preferences, and shares the quality data associated with the providers?  Yes

## 2024-10-04 NOTE — PERIOP NOTE
MD Daniel at bedside with patient. Pt VSS stable. Pain and Nausea controlled at this time. Verbal sign out per MD when pacu care is completed. Plan of care continues.

## 2024-10-04 NOTE — PROGRESS NOTES
Tub/Shower [] [] [] [] [] [] [] [] [] [] []       Toilet [] [] [] [] [] [] [] [] [] [] []        [] [] [] [] [] [] [] [] [] [] []    I=Independent, Mod I=Modified Independent, S=Supervision/Setup, SBA=Standby Assistance, CGA=Contact Guard Assistance, Min=Minimal Assistance, Mod=Moderate Assistance, Max=Maximal Assistance, Total=Total Assistance, NT=Not Tested    ACTIVITIES OF DAILY LIVING: I Mod I S SBA CGA Min Mod Max Total NT Comments   BASIC ADLs:              Upper Body Bathing [] [] [] [] [] [] [] [] [] []    Lower Body Bathing [] [] [] [] [] [] [] [] [] []    Toileting [] [] [] [x] [] [] [] [] [] []    Upper Body Dressing [] [] [x] [] [] [] [] [] [] []    Lower Body Dressing [] [] [] [] [x] [] [] [] [] []    Feeding [] [] [x] [] [] [] [] [] [] []    Grooming [] [] [x] [] [] [] [] [] [] []    Personal Device Care [] [] [] [] [] [] [] [] [] []    Functional Mobility [] [] [] [x] [] [] [] [] [] [] RW   I=Independent, Mod I=Modified Independent, S=Supervision/Setup, SBA=Standby Assistance, CGA=Contact Guard Assistance, Min=Minimal Assistance, Mod=Moderate Assistance, Max=Maximal Assistance, Total=Total Assistance, NT=Not Tested    PLAN:     FREQUENCY/DURATION   OT Plan of Care: 1 time/week, 2 times/week for duration of hospital stay or until stated goals are met, whichever comes first.    ACUTE OCCUPATIONAL THERAPY GOALS:   (Developed with and agreed upon by patient and/or caregiver.)    GOALS:   DISCHARGE GOALS (in preparation for going home/rehab):  3 days  1.  Mr. Smith will perform lower body dressing activity with minimal assist required to demonstrate improved functional mobility and safety.   -GOAL MET 10/4/24     2.  Mr. Smith will perform bathing activity with minimal assist required to demonstrate improved functional mobility and safety.  3.  Mr. Smith will perform toileting activity with  stand by assist to demonstrate improved functional mobility and safety.  4.  Mr. Smith will perform all  functional transfers transfer with stand by assist to demonstrate improved functional mobility and safety..-GOAL MET 10/4/24           PROBLEM LIST:   (Skilled intervention is medically necessary to address:)  Decreased ADL/Functional Activities  Decreased Activity Tolerance  Decreased AROM/PROM  Decreased Coordination  Decreased Gait Ability  Decreased Safety Awareness  Decreased Strength  Decreased Transfer Abilities  Increased Pain   INTERVENTIONS PLANNED:   (Benefits and precautions of occupational therapy have been discussed with the patient.)  Self Care Training  Therapeutic Activity  Therapeutic Exercise/HEP  Neuromuscular Re-education  Manual Therapy  Education         TREATMENT:     EVALUATION: LOW COMPLEXITY: (Untimed Charge)    TREATMENT:   SELF CARE: (25 minutes):   Procedure(s) (per grid) utilized to improve and/or restore self-care/home management as related to dressing, bathing, grooming, self feeding, and functional mobility  . Required minimal visual, verbal, manual, and tactile cueing to facilitate activities of daily living skills and compensatory activities.OT plan of care, discharge planning, adl task performance, post op showering, resting joint position, ice machine, walker use and home safety.        AFTER TREATMENT PRECAUTIONS: Bed/Chair Locked, Call light within reach, Chair, Needs within reach, RN notified, and Visitors at bedside    INTERDISCIPLINARY COLLABORATION:  RN/ PCT, PT/ PTA, and OT/ [place]    Interdisciplinary Patient Education  (Reference education tab)    [x] Safe And Effective Hygiene  [x] Fall Precautions  [x] Precautions  [x] D/C Instruction Review [x] Self Care Training and Home Safety  [x] Walker Management/Safety  [x] Adaptive Equipment as Needed  [x] Therapeutic Resting Position of Joint     TOTAL TREATMENT DURATION AND TIME:  Time In: 1345  Time Out: 1415  Minutes: 30    Angela Euceda OT

## 2024-10-04 NOTE — ANESTHESIA PROCEDURE NOTES
Peripheral Block    Patient location during procedure: pre-op  Reason for block: post-op pain management and at surgeon's request  Start time: 10/4/2024 7:34 AM  End time: 10/4/2024 7:36 AM  Staffing  Performed: anesthesiologist   Anesthesiologist: Ron Daniel MD  Performed by: Ron Daniel MD  Authorized by: Ron Daniel MD    Preanesthetic Checklist  Completed: patient identified, IV checked, site marked, risks and benefits discussed, surgical/procedural consents, equipment checked, pre-op evaluation, timeout performed, anesthesia consent given, oxygen available and monitors applied/VS acknowledged  Peripheral Block   Patient position: supine  Prep: ChloraPrep  Provider prep: sterile gloves and mask  Patient monitoring: cardiac monitor, continuous pulse ox, frequent blood pressure checks, IV access, oxygen and responsive to questions  Block type: Femoral  Adductor canal  Laterality: right  Injection technique: single-shot  Guidance: ultrasound guided    Needle   Needle type: insulated echogenic nerve stimulator needle   Needle localization: ultrasound guidance  Assessment   Injection assessment: negative aspiration for heme, no paresthesia on injection, local visualized surrounding nerve on ultrasound and no intravascular symptoms  Paresthesia pain: none  Slow fractionated injection: yes  Hemodynamics: stable  Outcomes: uncomplicated and patient tolerated procedure well    Additional Notes  Ultrasound image taken and stored in chart   Medications Administered  ropivacaine (NAROPIN) injection 0.2% - Perineural   20 mL - 10/4/2024 7:34:00 AM  dexAMETHasone (DECADRON) injection 4 mg/mL - Perineural   4 mg - 10/4/2024 7:34:00 AM

## 2024-10-04 NOTE — PROGRESS NOTES
ACUTE PHYSICAL THERAPY GOALS:   (Developed with and agreed upon by patient and/or caregiver.)  GOALS (1-4 days):  (1.)Mr. Smith will move from supine to sit and sit to supine  in bed with SUPERVISION.  (2.)Mr. Smith will transfer from bed to chair and chair to bed with SUPERVISION using the least restrictive device.  (3.)Mr. Smith will ambulate with SUPERVISION for 300 feet with the least restrictive device.  (4.)Mr. Smith will ambulate up/down 5 steps with left railing with STAND BY ASSIST.  (5.)Mr. Smith will increase right knee ROM to 0-90°.  ________________________________________________________________________________________________    PHYSICAL THERAPY: TOTAL KNEE ARTHROPLASTY Initial Assessment and PM  (Link to Caseload Tracking: PT Visit Days : 1  Acknowledge Orders  Time In/Out  PT Charge Capture  Rehab Caseload Tracker  Episode   Aubrey Smith Jr. is a 69 y.o. male   PRIMARY DIAGNOSIS: Status post right knee replacement  Primary osteoarthritis of right knee [M17.11]  Procedure(s) (LRB):  RIGHT KNEE TOTAL ARTHROPLASTY ROBOTIC *SDD* (Right)  Day of Surgery  Reason for Referral: Pain in Right Knee (M25.561)  Stiffness of Right Knee, Not elsewhere classified (M25.661)  Difficulty in walking, Not elsewhere classified (R26.2)  Outpatient in a bed: Payor: [employer] / Plan: [employer] / Product Type: *No Product type* /     REHAB RECOMMENDATIONS:   Recommendation to date pending progress:  Setting:  Home Health Therapy    Equipment:     Has a RW, CM helped with cane     RANGE OF MOTION:   Right Knee Flexion: R Knee Flexion (0-145): 80 (approximate)  Right Knee Extension: R Knee Extension (0): 10 (approximate)     GAIT: I Mod I S SBA CGA Min Mod Max Total  NT x2 Comments:   Level of Assistance [] [] [] [x] [] [] [] [] [] [] []            Weightbearing Status  wbat     Distance  540 feet    Gait Quality Antalgic, Decreased neri , Decreased step clearance, Decreased step length, and Decreased

## 2024-10-04 NOTE — ANESTHESIA PRE PROCEDURE
auscultation                            ROS comment: Former smoker   Cardiovascular:  Exercise tolerance: good (>4 METS)  (+) hypertension:, dysrhythmias (s/p ablation - not on blood thinners): atrial fibrillation, CHF (h/o NICM from poorly controlled Afib - improved after ablation - EF normal on most recent echo):, hyperlipidemia        Rhythm: regular  Rate: normal                 ROS comment: EF normal per echo 2021     Neuro/Psych:                ROS comment: H/o cervical stenosis s/p extensive repair GI/Hepatic/Renal: Neg GI/Hepatic/Renal ROS            Endo/Other:    (+) : arthritis:..                  ROS comment: Extensive lumbar radiculopathy/stenosis and chronic pain  Abdominal:             Vascular: negative vascular ROS.         Other Findings:             Anesthesia Plan      general     ASA 3     (GETA + ketamine + PNB. Patient has extensive back issues/surgeries. Will plan on avoiding neuraxial. Glidescope for intubation )  Induction: intravenous.      Anesthetic plan and risks discussed with patient.              Post-op pain plan if not by surgeon: single peripheral nerve block            Ron Daniel MD   10/4/2024

## 2024-10-04 NOTE — ANESTHESIA POSTPROCEDURE EVALUATION
Department of Anesthesiology  Postprocedure Note    Patient: Aubrey Smith Jr.  MRN: [id number]  YOB: 1955  Date of evaluation: 10/4/2024    Procedure Summary       Date: 10/04/24 Room / [facility]  / [facility]    Anesthesia Start: 0746 Anesthesia Stop: 1006    Procedure: RIGHT KNEE TOTAL ARTHROPLASTY ROBOTIC *SDD* (Right: Knee) Diagnosis:       Primary osteoarthritis of right knee      (Primary osteoarthritis of right knee [M17.11])    Surgeons: Angel Goodwin Jr., MD Responsible Provider: Ron Daniel MD    Anesthesia Type: general ASA Status: 3            Anesthesia Type: No value filed.    Rhea Phase I: Rhea Score: 10    Rhea Phase II:      Anesthesia Post Evaluation    Patient location during evaluation: PACU  Patient participation: complete - patient participated  Level of consciousness: awake and alert  Airway patency: patent  Nausea: well controlled.  Cardiovascular status: acceptable.  Respiratory status: acceptable  Hydration status: stable  Pain management: adequate    No notable events documented.

## 2024-10-04 NOTE — PERIOP NOTE
TRANSFER - OUT REPORT:    Verbal report given to Radha CONROY on Aubrey Smith Jr.  being transferred to [facility] for routine progression of patient care       Report consisted of patient's Situation, Background, Assessment and   Recommendations(SBAR).     Information from the following report(s) Nurse Handoff Report, Adult Overview, Surgery Report, MAR, and Cardiac Rhythm SR  was reviewed with the receiving nurse.           Lines:   Peripheral IV 10/04/24 Left;Posterior Hand (Active)   Site Assessment Clean, dry & intact 10/04/24 1005   Line Status Infusing 10/04/24 1005   Line Care Connections checked and tightened 10/04/24 1005   Phlebitis Assessment No symptoms 10/04/24 1005   Infiltration Assessment 0 10/04/24 1005   Alcohol Cap Used No 10/04/24 1005   Dressing Status Clean, dry & intact 10/04/24 1005   Dressing Type Transparent 10/04/24 1005   Dressing Intervention New 10/04/24 0635        Opportunity for questions and clarification was provided.      Patient transported with:  O2 @ 0lpm

## 2024-10-04 NOTE — OP NOTE
[facility]  [place] Robotic Assisted Total Knee Arthroplasty: Posterior Cruciate Retaining       Patient:Aubrey Smith Jr.   : 1955  Medical Record Number:[id number]  Pre-operative Diagnosis:  Primary osteoarthritis of right knee [M17.11]  Post-operative Diagnosis: Primary osteoarthritis of right knee [M17.11]  Location: [facility] [place]  Surgeon: Angel Goodwin MD   Assistant: Demetri Tsai    Anesthesia: Spinal and ACB    Indications: Patient has end stage arthritis. They have tried and failed conservative management.    Procedure:Procedure(s) (LRB):  RIGHT KNEE TOTAL ARTHROPLASTY ROBOTIC *SDD* (Right)            CPT- 20050- Total knee arthroplasty           45490- Other procedures on musculoskeletal system            The complexity of the total joint surgery requires the use of a first assistant for positioning, retraction and expertise in closure.     Tourniquet Time: 0 minutes  EBL: 250 cc  Findings: severe degenerative arthritis with loss of cartilage in medial weight bearing compartment of the knee, no patellar osteophytes with moderate loss of patella cartilage, no posterior femoral osteophytes   BMI: Body mass index is 20.37 kg/m².    Aubrey Smith Jr. was brought to the operating room and positioned on the operating table.  He was anesthestized with anesthesia. IV antibiotics were administered. Prior to the incision being made a timeout was called identifying the patient, procedure ,operative side and surgeon The operative leg was prepped and draped in the usual sterile manner.  An anterior longitudinal incision was accomplished just medial to the tibial tubercle and extending approximal 6 centimeters proximal to the superior pole of the patella.  A medial parapatellar capsular incision was performed. The medial capsular flap was elevated around to the insertion of the semimembranous tendon.  The patella was everted and the knee flexed and externally rotated.

## 2024-10-04 NOTE — INTERVAL H&P NOTE
Update History & Physical    The patient's History and Physical of September 26, 2024 was reviewed with the patient and I examined the patient. There was no change. The surgical site was confirmed by the patient and me.     Plan: The risks, benefits, expected outcome, and alternative to the recommended procedure have been discussed with the patient. Patient understands and wants to proceed with the procedure.     Electronically signed by LAURITA GRANADOS JR, MD on 10/4/2024 at 6:42 AM

## 2024-11-06 ENCOUNTER — TELEPHONE (OUTPATIENT)
Dept: ORTHOPEDIC SURGERY | Age: 69
End: 2024-11-06

## 2024-11-06 NOTE — TELEPHONE ENCOUNTER
[facility] is calling about this patient. He will need a [facility] auth for his visit. Please call to let her know if we have that.

## 2024-11-06 NOTE — TELEPHONE ENCOUNTER
Spoke with [employer] and they are going to check with patient , the first 15 visits are covered but after that [employer] will have to get there referral auth

## 2024-11-07 ENCOUNTER — OFFICE VISIT (OUTPATIENT)
Dept: ORTHOPEDIC SURGERY | Age: 69
End: 2024-11-07

## 2024-11-07 DIAGNOSIS — Z96.651 STATUS POST RIGHT KNEE REPLACEMENT: Primary | ICD-10-CM

## 2024-11-07 PROCEDURE — 99024 POSTOP FOLLOW-UP VISIT: CPT | Performed by: PHYSICIAN ASSISTANT

## 2024-11-07 NOTE — PROGRESS NOTES
Post-op TKA Visit      11/07/24     No Known Allergies  Current Outpatient Medications on File Prior to Visit   Medication Sig Dispense Refill    aspirin (ASPIRIN 81) 81 MG EC tablet Take 1 tablet by mouth in the morning and at bedtime 70 tablet 0    promethazine (PHENERGAN) 12.5 MG tablet Take 1 tablet by mouth 4 times daily as needed for Nausea 20 tablet 2    methocarbamol (ROBAXIN-750) 750 MG tablet Take 1 tablet by mouth 3 times daily as needed (muscle spasm or cramps) 40 tablet 1    celecoxib (CELEBREX) 200 MG capsule Take 1 capsule by mouth 2 times daily 60 capsule 0    diclofenac (VOLTAREN) 0.1 % ophthalmic solution 1 drop 4 times daily      metoprolol succinate (TOPROL XL) 50 MG extended release tablet Take 0.5 tablets by mouth daily Takes 25 mg tablet daily      polyethylene glycol (GLYCOLAX) 17 g packet Take 1 packet by mouth daily      morphine (MSIR) 15 MG tablet Take 1 tablet by mouth in the morning and at bedtime. BID      CALCIUM PO Take 600 mg by mouth every evening      UNABLE TO FIND Collagen peptides      UNABLE TO FIND BCAA      CREATINE MONOHYDRATE PO Take by mouth      Whey Protein POWD Take by mouth      magnesium oxide (MAG-OX) 400 MG tablet Take 1 tablet by mouth every evening      acetaminophen (TYLENOL) 500 MG tablet Take 1 tablet by mouth 2 times daily      melatonin 3 MG TABS tablet Take 1 tablet by mouth at bedtime       No current facility-administered medications on file prior to visit.        5 weeks Status Post right TKA     History: The patient returns today for post-op visit following right TKA.   Their pain is improving. They are ambulating without any walking aid. They have completed home physical therapy and has not yet started outpatient therapy.   They are pleased with their results thus far. Denies any new complaints today.     Physical Exam:  The patient's incision is well healed. There is mild swelling and mild increased warmth. ROM is 2 to 105 degrees. There is no M/L or

## 2024-11-11 ENCOUNTER — TELEPHONE (OUTPATIENT)
Dept: ORTHOPEDIC SURGERY | Age: 69
End: 2024-11-11

## 2024-11-11 NOTE — TELEPHONE ENCOUNTER
Told Arron that I would work on the VA referral form for outpatient PT. Patient is concerned that PT will not be paid for without it. The VA office is closed for veteran's day and the fax number is not listed on the website.

## 2024-11-11 NOTE — TELEPHONE ENCOUNTER
They received an order for outpt therapy. They need auth from the [facility]. She says they can use the same auth form as home health but they need a copy. She wants to know if this is how you think it works. Please give her a return call.

## 2024-11-12 ENCOUNTER — TELEPHONE (OUTPATIENT)
Dept: ORTHOPEDIC SURGERY | Age: 69
End: 2024-11-12

## 2024-11-12 NOTE — TELEPHONE ENCOUNTER
Explained to patient that Rita will address scheduling his left knee when she returns to the office next week. Patient is concerned that he is living in an RV and needs to get left knee done as soon as possible.

## 2024-11-18 ENCOUNTER — TELEPHONE (OUTPATIENT)
Dept: ORTHOPEDIC SURGERY | Age: 69
End: 2024-11-18

## 2024-11-18 DIAGNOSIS — M17.12 PRIMARY OSTEOARTHRITIS OF LEFT KNEE: Primary | ICD-10-CM

## 2024-11-26 ENCOUNTER — TELEPHONE (OUTPATIENT)
Dept: ORTHOPEDIC SURGERY | Age: 69
End: 2024-11-26

## 2024-11-26 NOTE — TELEPHONE ENCOUNTER
She needs to know if he has had PT since his surgery. She is asking for a return call or to email the PT order to her at [email or web address]. Their fax is down right now.

## 2024-12-10 ENCOUNTER — TELEPHONE (OUTPATIENT)
Dept: ORTHOPEDIC SURGERY | Age: 69
End: 2024-12-10

## 2024-12-10 ENCOUNTER — OFFICE VISIT (OUTPATIENT)
Dept: ORTHOPEDIC SURGERY | Age: 69
End: 2024-12-10

## 2024-12-10 DIAGNOSIS — M17.12 PRIMARY OSTEOARTHRITIS OF LEFT KNEE: Primary | ICD-10-CM

## 2024-12-10 DIAGNOSIS — Z96.651 STATUS POST RIGHT KNEE REPLACEMENT: ICD-10-CM

## 2024-12-10 PROCEDURE — PREOPEXAM PRE-OP EXAM: Performed by: ORTHOPAEDIC SURGERY

## 2024-12-10 NOTE — TELEPHONE ENCOUNTER
He had surgery in Oct. He thinks they left a knee cap in his knee. He wants someone to review his xrays that he had at his post op appt. He is having some clicking with pain. He is asking if you will review the xrays and give him a call.

## 2024-12-10 NOTE — PROGRESS NOTES
[facility]  Pre Operative History and Physical Exam    Patient ID:  Aubrey Smith Jr.  [id number]  69 y.o.  1955    Today: December 10, 2024           CC: Left knee pain    HPI:   The patient has end stage arthritis of the left knee. The patient was evaluated and examined during a consultation prior to this office visit.  There have been no changes to the patient's orthopedic condition since the initial consultation. The patient has failed previous conservative treatment for this condition including antiinflammatories , and lifestyle modifications. The necessity for joint replacement is present. The patient will be admitted the day of surgery for left knee replacement    Past Medical/Surgical History:  Past Medical History:   Diagnosis Date    Abnormal CT scan, lumbar spine 05/10/2023    1. Extensive degenerative disc and facet disease throughout the lumbar spine with central spinal stenosis evident at L2-L3, L3-L4 and L4-L5. Mild scoliosis is present. 2. Mild degenerative disc changes in the thoracic spine at T8-T9, T9-T10 and to a greater extent at T11-T12 and T12-L1. 3. Prior posterior fusion at C7-T1    Atrial fibrillation (HCC)     Dx in ; takes Metoprolol; s/p ablation 09/27/2021; no issues currently    DDD (degenerative disc disease), lumbar     History of echocardiogram 12/08/2021    · LV: Estimated LVEF is 50 - 55%. Normal cavity size, wall thickness, systolic function (ejection fraction normal) and diastolic function. · MV: Mild mitral valve regurgitation is present. · TV: Mild tricuspid valve regurgitation is present. Right Ventricular Arterial Pressure (RVSP) is 34 mmHg.    Lumbar stenosis     NICM (nonischemic cardiomyopathy) (HCC)     NICM, EF 30-35%: tachy mediated, EF now 50-55% with maintenance of NSR    Pain management     pt goes to pain management for back    Status post cervical spinal fusion     Status post laminectomy      Past Surgical History:

## 2024-12-10 NOTE — H&P (VIEW-ONLY)
Neutrophils Absolute 09/10/2024 3.4  1.7 - 8.2 K/UL Final    Lymphocytes Absolute 09/10/2024 1.5  0.5 - 4.6 K/UL Final    Monocytes Absolute 09/10/2024 0.4  0.1 - 1.3 K/UL Final    Eosinophils Absolute 09/10/2024 0.1  0.0 - 0.8 K/UL Final    Basophils Absolute 09/10/2024 0.0  0.0 - 0.2 K/UL Final    Immature Granulocytes Absolute 09/10/2024 0.0  0.0 - 0.5 K/UL Final    MRSA by PCR 09/10/2024 Not detected  NOTD   Final    Comment: A positive test result does not necessarily indicate the presence of a viable organism. A positive result is indicative of the presence of SA or MRSA DNA.  The edenes StaphSR assay is intended to aid in the prevention and control of MRSA and SA infections in healthcare settings.  It is not intended to diagnose MRSA or SA infections nor guide or monitor treatment for MRSA/SA infections. A negative result does not preclude nasal colonization.      SA by PCR 09/10/2024 Not detected  NOTD   Final                 Patient Active Problem List   Diagnosis    Hyperlipidemia    Chest pain    PEREZ (dyspnea on exertion)    Persistent atrial fibrillation (HCC)    Cardiomyopathy (HCC)    Paroxysmal atrial fibrillation (HCC)    Cervical spinal stenosis    Degenerative disc disease, cervical    Cervical radiculopathy    Acquired spondylolisthesis of cervical vertebra    Status post cervical spinal fusion    Primary osteoarthritis of left knee    Primary osteoarthritis of right knee    Status post right knee replacement     XRAY:  AP, lateral, sunrise, and 45 degree  views of the bilateral knees are reviewed.    There is advanced joint space loss with bone-on-bone articulation, eburnated bone, and osteophyte formation present.  X-ray impression:  Advanced osteoarthritis of left knee,   3 views of the knee are reveal good bone prosthetic interface with no suggestion of progressive lucency.  X-ray impression:  Stable right total knee arthroplasty    Assessment:   1. Arthritis of the left knee      Plan:    1.

## 2024-12-10 NOTE — TELEPHONE ENCOUNTER
Spoke with patient and he is having clunking when he walks and pain above the knee cap made him an appt to see the doctor

## 2024-12-13 ENCOUNTER — PREP FOR PROCEDURE (OUTPATIENT)
Dept: ORTHOPEDIC SURGERY | Age: 69
End: 2024-12-13

## 2024-12-13 DIAGNOSIS — M17.12 PRIMARY OSTEOARTHRITIS OF LEFT KNEE: Primary | ICD-10-CM

## 2024-12-13 RX ORDER — SODIUM CHLORIDE 0.9 % (FLUSH) 0.9 %
5-40 SYRINGE (ML) INJECTION EVERY 12 HOURS SCHEDULED
Status: CANCELLED | OUTPATIENT
Start: 2024-12-13

## 2024-12-13 RX ORDER — ACETAMINOPHEN 325 MG/1
1000 TABLET ORAL ONCE
Status: CANCELLED | OUTPATIENT
Start: 2024-12-13 | End: 2024-12-13

## 2024-12-13 RX ORDER — SODIUM CHLORIDE 9 MG/ML
INJECTION, SOLUTION INTRAVENOUS PRN
Status: CANCELLED | OUTPATIENT
Start: 2024-12-13

## 2024-12-13 RX ORDER — SODIUM CHLORIDE 0.9 % (FLUSH) 0.9 %
5-40 SYRINGE (ML) INJECTION PRN
Status: CANCELLED | OUTPATIENT
Start: 2024-12-13

## 2024-12-16 ENCOUNTER — HOSPITAL ENCOUNTER (OUTPATIENT)
Dept: SURGERY | Age: 69
Discharge: HOME | End: 2024-12-19
Payer: OTHER GOVERNMENT

## 2024-12-16 VITALS
HEIGHT: 70 IN | SYSTOLIC BLOOD PRESSURE: 144 MMHG | WEIGHT: 156.3 LBS | BODY MASS INDEX: 22.38 KG/M2 | OXYGEN SATURATION: 97 % | DIASTOLIC BLOOD PRESSURE: 80 MMHG | RESPIRATION RATE: 18 BRPM | HEART RATE: 69 BPM | TEMPERATURE: 98.2 F

## 2024-12-16 DIAGNOSIS — M17.12 PRIMARY OSTEOARTHRITIS OF LEFT KNEE: ICD-10-CM

## 2024-12-16 LAB
ALBUMIN SERPL-MCNC: 3.2 G/DL (ref 3.2–4.6)
ALBUMIN/GLOB SERPL: 1 (ref 1–1.9)
ALP SERPL-CCNC: 117 U/L (ref 40–129)
ALT SERPL-CCNC: 23 U/L (ref 8–55)
ANION GAP SERPL CALC-SCNC: 8 MMOL/L (ref 7–16)
AST SERPL-CCNC: 23 U/L (ref 15–37)
BASOPHILS # BLD: 0 K/UL (ref 0–0.2)
BASOPHILS NFR BLD: 1 % (ref 0–2)
BILIRUB SERPL-MCNC: 0.4 MG/DL (ref 0–1.2)
BUN SERPL-MCNC: 15 MG/DL (ref 8–23)
CALCIUM SERPL-MCNC: 9.2 MG/DL (ref 8.8–10.2)
CHLORIDE SERPL-SCNC: 103 MMOL/L (ref 98–107)
CO2 SERPL-SCNC: 28 MMOL/L (ref 20–29)
CREAT SERPL-MCNC: 0.87 MG/DL (ref 0.8–1.3)
DIFFERENTIAL METHOD BLD: ABNORMAL
EOSINOPHIL # BLD: 0.2 K/UL (ref 0–0.8)
EOSINOPHIL NFR BLD: 3 % (ref 0.5–7.8)
ERYTHROCYTE [DISTWIDTH] IN BLOOD BY AUTOMATED COUNT: 13.1 % (ref 11.9–14.6)
EST. AVERAGE GLUCOSE BLD GHB EST-MCNC: 117 MG/DL
GLOBULIN SER CALC-MCNC: 3.3 G/DL (ref 2.3–3.5)
GLUCOSE SERPL-MCNC: 118 MG/DL (ref 70–99)
HBA1C MFR BLD: 5.7 % (ref 0–5.6)
HCT VFR BLD AUTO: 37.9 % (ref 41.1–50.3)
HGB BLD-MCNC: 12.1 G/DL (ref 13.6–17.2)
IMM GRANULOCYTES # BLD AUTO: 0 K/UL (ref 0–0.5)
IMM GRANULOCYTES NFR BLD AUTO: 0 % (ref 0–5)
INR PPP: 1
LYMPHOCYTES # BLD: 1.8 K/UL (ref 0.5–4.6)
LYMPHOCYTES NFR BLD: 27 % (ref 13–44)
MCH RBC QN AUTO: 29.7 PG (ref 26.1–32.9)
MCHC RBC AUTO-ENTMCNC: 31.9 G/DL (ref 31.4–35)
MCV RBC AUTO: 92.9 FL (ref 82–102)
MONOCYTES # BLD: 0.5 K/UL (ref 0.1–1.3)
MONOCYTES NFR BLD: 7 % (ref 4–12)
MRSA DNA SPEC QL NAA+PROBE: NOT DETECTED
NEUTS SEG # BLD: 4 K/UL (ref 1.7–8.2)
NEUTS SEG NFR BLD: 62 % (ref 43–78)
NRBC # BLD: 0 K/UL (ref 0–0.2)
PLATELET # BLD AUTO: 324 K/UL (ref 150–450)
PMV BLD AUTO: 9 FL (ref 9.4–12.3)
POTASSIUM SERPL-SCNC: 4.1 MMOL/L (ref 3.5–5.1)
PROT SERPL-MCNC: 6.5 G/DL (ref 6.3–8.2)
PROTHROMBIN TIME: 13.4 SEC (ref 11.3–14.9)
RBC # BLD AUTO: 4.08 M/UL (ref 4.23–5.6)
S AUREUS CPE NOSE QL NAA+PROBE: NOT DETECTED
SODIUM SERPL-SCNC: 139 MMOL/L (ref 136–145)
WBC # BLD AUTO: 6.5 K/UL (ref 4.3–11.1)

## 2024-12-16 PROCEDURE — 85025 COMPLETE CBC W/AUTO DIFF WBC: CPT

## 2024-12-16 PROCEDURE — 93005 ELECTROCARDIOGRAM TRACING: CPT | Performed by: ANESTHESIOLOGY

## 2024-12-16 PROCEDURE — 85610 PROTHROMBIN TIME: CPT

## 2024-12-16 PROCEDURE — 83036 HEMOGLOBIN GLYCOSYLATED A1C: CPT

## 2024-12-16 PROCEDURE — 80053 COMPREHEN METABOLIC PANEL: CPT

## 2024-12-16 PROCEDURE — 87641 MR-STAPH DNA AMP PROBE: CPT

## 2024-12-16 RX ORDER — CELECOXIB 100 MG/1
100 CAPSULE ORAL 2 TIMES DAILY
COMMUNITY

## 2024-12-16 ASSESSMENT — PAIN DESCRIPTION - LOCATION: LOCATION: KNEE

## 2024-12-16 ASSESSMENT — PAIN SCALES - GENERAL: PAINLEVEL_OUTOF10: 4

## 2024-12-16 ASSESSMENT — PAIN DESCRIPTION - ORIENTATION: ORIENTATION: LEFT

## 2024-12-16 ASSESSMENT — PAIN DESCRIPTION - DESCRIPTORS: DESCRIPTORS: ACHING;SHARP;STABBING

## 2024-12-16 NOTE — PERIOP NOTE
CBC, CMP, PT/INR; results are within anesthesia guidelines, no follow-up required. Labs automatically routed to ordering provider via Epic documentation.    A1C still in process; charge nurse to follow up.

## 2024-12-16 NOTE — PERIOP NOTE
PLEASE CONTINUE TAKING ALL PRESCRIPTION MEDICATIONS UP TO THE DAY OF SURGERY UNLESS OTHERWISE DIRECTED BELOW. You may take Tylenol, allergy, and/or indigestion medications.     TAKE ONLY THESE MEDICATIONS ON THE DAY OF SURGERY ON 12/30/24      Tylenol if needed, Metoprolol, Morphine            DISCONTINUE all vitamins, herbals, and supplements 3 weeks prior to surgery. DISCONTINUE Non-Steroidal Anti-Inflammatory (NSAIDS) such as Advil, Ibuprofen, Motrin, Naproxen, and Aleve 5 days prior to surgery unless instructed to hold longer by surgeon.     Home Medications to Hold- please continue all other medications except these.      Diclofenac Gel=hold for 5 days prior         Comments   *can stay on Magnesium*    On the day before surgery (12/29/24) please take 2 Tylenol in the morning and then again before bed. You may use either regular or extra strength.      Bring: Photo ID, Insurance card, Vanessa-hex soap, Incentive Spirometer        Please do not bring home medications with you on the day of surgery unless otherwise directed by your nurse.  If you are instructed to bring home medications, please give them to your nurse as they will be administered by the nursing staff.    If you have any questions, please call [facility] [phone].    A copy of this note was provided to the patient for reference.

## 2024-12-16 NOTE — PERIOP NOTE
Patient verified name and .    Order for consent was found in EHR and does match case posting; patient verified.     Type 3 surgery, walk in assessment complete.    Labs per surgeon: CBC, CMP, A1C, PT/INR ; results pending.  Labs per anesthesia protocol: no additional labs needed.   EKG: Completed today and within anesthesia protocol.     [facility] Cardiology note (23), Stress test (21) and Echo (21) available in EHR for reference.    MRSA/MSSA swab collected per policy. MD to consult pharmacy to dose Vanc if appropriate.     Hospital approved surgical skin cleanser and instructions to return bottle on DOS given per hospital policy.    Patient provided with handouts including Guide to Surgery, Pain Management, Preventing Surgical Site Infections, and [facility] Brochure.    Patient answered medical/surgical history questions at their best of ability. All prior to admission medications documented in Epic. Original medication prescription bottle was not visualized during patient appointment.     Patient instructed to hold all vitamins 3 weeks prior to surgery and NSAIDS 5 days prior to surgery.     Patient teach back successful and patient demonstrates knowledge of instruction.

## 2024-12-17 ENCOUNTER — TELEPHONE (OUTPATIENT)
Dept: ORTHOPEDIC SURGERY | Age: 69
End: 2024-12-17

## 2024-12-17 LAB
EKG ATRIAL RATE: 65 BPM
EKG DIAGNOSIS: NORMAL
EKG P AXIS: 83 DEGREES
EKG P-R INTERVAL: 136 MS
EKG Q-T INTERVAL: 430 MS
EKG QRS DURATION: 146 MS
EKG QTC CALCULATION (BAZETT): 447 MS
EKG R AXIS: 49 DEGREES
EKG T AXIS: 61 DEGREES
EKG VENTRICULAR RATE: 65 BPM

## 2024-12-17 PROCEDURE — 93010 ELECTROCARDIOGRAM REPORT: CPT | Performed by: INTERNAL MEDICINE

## 2024-12-17 NOTE — TELEPHONE ENCOUNTER
He states he keeps having the same issue with his knee and he keeps requesting to see Dr. Goodwin but only sees the PA every time. Please call to discuss.

## 2024-12-17 NOTE — PROGRESS NOTES
Absolute 0.0 0.0 - 0.2 K/UL    Immature Granulocytes Absolute 0.0 0.0 - 0.5 K/UL   MRSA/Staph Aureas DNA    Collection Time: 12/16/24  1:41 PM    Specimen: Nasal Swab   Result Value Ref Range    MRSA by PCR Not detected NOTD      SA by PCR Not detected NOTD     EKG 12 Lead    Collection Time: 12/16/24  1:55 PM   Result Value Ref Range    Ventricular Rate 65 BPM    Atrial Rate 65 BPM    P-R Interval 136 ms    QRS Duration 146 ms    Q-T Interval 430 ms    QTc Calculation (Bazett) 447 ms    P Axis 83 degrees    R Axis 49 degrees    T Axis 61 degrees    Diagnosis       Normal sinus rhythm  Right bundle branch block  Abnormal ECG  When compared with ECG of 27-SEP-2021 07:06,  Sinus rhythm has replaced Atrial fibrillation  Right bundle branch block has replaced Incomplete right bundle branch block  Confirmed by DIONICIO HINES (), LAST CHRISTIAN ([id number]) on 12/17/2024 9:51:48 AM           Problem List:  )  Patient Active Problem List   Diagnosis    Hyperlipidemia    Chest pain    PEREZ (dyspnea on exertion)    Persistent atrial fibrillation (HCC)    Cardiomyopathy (HCC)    Paroxysmal atrial fibrillation (HCC)    Cervical spinal stenosis    Degenerative disc disease, cervical    Cervical radiculopathy    Acquired spondylolisthesis of cervical vertebra    Status post cervical spinal fusion    Primary osteoarthritis of left knee    Primary osteoarthritis of right knee    Status post right knee replacement       Total Joint Surgery Pre-Assessment Recommendations:           Continuous saturation monitoring during hospitalization. O2 prn per respiratory protocol.     Signed By: IGOR Redd - CNP-C    December 17, 2024

## 2024-12-19 ENCOUNTER — OFFICE VISIT (OUTPATIENT)
Dept: ORTHOPEDIC SURGERY | Age: 69
End: 2024-12-19

## 2024-12-19 DIAGNOSIS — Z96.651 STATUS POST RIGHT KNEE REPLACEMENT: Primary | ICD-10-CM

## 2024-12-19 NOTE — PROGRESS NOTES
Post-op TKA Visit      12/19/24     No Known Allergies  Current Outpatient Medications on File Prior to Visit   Medication Sig Dispense Refill    Multiple Vitamin (MULTIVITAMIN ADULT PO) Take 1 tablet by mouth daily      celecoxib (CELEBREX) 100 MG capsule Take 1 capsule by mouth 2 times daily      diclofenac sodium (VOLTAREN) 1 % GEL Apply topically 4 times daily as needed for Pain      aspirin (ASPIRIN 81) 81 MG EC tablet Take 1 tablet by mouth in the morning and at bedtime 70 tablet 0    promethazine (PHENERGAN) 12.5 MG tablet Take 1 tablet by mouth 4 times daily as needed for Nausea 20 tablet 2    methocarbamol (ROBAXIN-750) 750 MG tablet Take 1 tablet by mouth 3 times daily as needed (muscle spasm or cramps) 40 tablet 1    celecoxib (CELEBREX) 200 MG capsule Take 1 capsule by mouth 2 times daily 60 capsule 0    metoprolol succinate (TOPROL XL) 50 MG extended release tablet Take 0.5 tablets by mouth daily Takes 25 mg tablet daily      polyethylene glycol (GLYCOLAX) 17 g packet Take 1 packet by mouth daily as needed for Constipation      morphine (MSIR) 15 MG tablet Take 1 tablet by mouth in the morning and at bedtime. BID      CALCIUM PO Take 600 mg by mouth every evening      UNABLE TO FIND Collagen peptides (Patient not taking: Reported on 12/16/2024)      UNABLE TO FIND BCAA (Patient not taking: Reported on 12/16/2024)      CREATINE MONOHYDRATE PO Take by mouth (Patient not taking: Reported on 12/16/2024)      Whey Protein POWD Take by mouth (Patient not taking: Reported on 12/16/2024)      magnesium oxide (MAG-OX) 400 MG tablet Take 1 tablet by mouth every evening      acetaminophen (TYLENOL) 500 MG tablet Take 2 tablets by mouth every 6 hours as needed for Pain      melatonin 3 MG TABS tablet Take 1 tablet by mouth nightly as needed       No current facility-administered medications on file prior to visit.        10 weeks Status Post right TKA     History: The patient returns today for post-op visit

## 2024-12-20 DIAGNOSIS — Z01.818 ENCOUNTER FOR PRE-OPERATIVE EXAMINATION: ICD-10-CM

## 2024-12-20 DIAGNOSIS — M17.12 PRIMARY OSTEOARTHRITIS OF LEFT KNEE: Primary | ICD-10-CM

## 2024-12-20 RX ORDER — CELECOXIB 200 MG/1
200 CAPSULE ORAL 2 TIMES DAILY
Qty: 60 CAPSULE | Refills: 0 | Status: CANCELLED | OUTPATIENT
Start: 2024-12-20 | End: 2025-01-19

## 2024-12-23 RX ORDER — PROMETHAZINE HYDROCHLORIDE 12.5 MG/1
12.5 TABLET ORAL 4 TIMES DAILY PRN
Qty: 20 TABLET | Refills: 2 | Status: SHIPPED | OUTPATIENT
Start: 2024-12-28

## 2024-12-23 RX ORDER — OXYCODONE HYDROCHLORIDE 5 MG/1
5-10 TABLET ORAL
Qty: 60 TABLET | Refills: 0 | Status: SHIPPED | OUTPATIENT
Start: 2024-12-28 | End: 2025-01-02

## 2024-12-23 RX ORDER — ASPIRIN 81 MG/1
81 TABLET ORAL 2 TIMES DAILY
Qty: 70 TABLET | Refills: 0 | Status: SHIPPED | OUTPATIENT
Start: 2024-12-28 | End: 2025-02-01

## 2024-12-23 RX ORDER — METHOCARBAMOL 750 MG/1
750 TABLET, FILM COATED ORAL 3 TIMES DAILY PRN
Qty: 40 TABLET | Refills: 1 | Status: SHIPPED | OUTPATIENT
Start: 2024-12-28

## 2024-12-23 NOTE — DISCHARGE INSTRUCTIONS
[facility]   Patient Discharge Instructions    Aubrey Smith JrBarbara / [id number] : 1955    Admitted (Not on file) Discharged: 2024     IF YOU HAVE ANY PROBLEMS ONCE YOU ARE AT HOME CALL THE FOLLOWING NUMBERS:   Nurse's line: [phone]  Main office number: [phone]      Medications    The medications you are to continue on are listed on the medication reconciliation sheet.   Narcotic pain medications as well as supplemental iron can cause constipation. If this occurs, try over the counter stool softeners or try stopping the narcotic pain medication and/or the iron.   It is important that you take the medication exactly as they are prescribed.  Medications which increase your risk of blood clots are listed to stop for 5 weeks after surgery as well as medications or supplements which increase your risk of bleeding complications.   Keep your medication in the bottles provided by the pharmacist and keep a list of the medication names, dosages, and times to be taken in your wallet.   Do not take other medications without consulting your doctor.  If you need a refill on your pain medication, please note our office is closed over the weekend. It is our office policy that on-call providers cannot refill narcotic pain medications over the weekend. If you will need a refill over the weekend, please call our office before 12pm on Friday or first thing Monday morning.        Important Information    Do NOT smoke as this will greatly increase your risk of infection!    Resume your prehospital diet. If you have excessive nausea or vomitting call your doctor's office     Leg swelling and warmth is normal for 6 months after surgery. If you experience swelling in your leg, elevate your leg while laying down with your toes above your heart. If you have sudden onset severe swelling with leg pain call our office. Use Kiran Hose stockings until we see you in the office for your follow up

## 2024-12-30 ENCOUNTER — HOSPITAL ENCOUNTER (OUTPATIENT)
Age: 69
Discharge: HOME | End: 2024-12-30
Attending: ORTHOPAEDIC SURGERY | Admitting: ORTHOPAEDIC SURGERY
Payer: OTHER GOVERNMENT

## 2024-12-30 ENCOUNTER — ANESTHESIA EVENT (OUTPATIENT)
Dept: SURGERY | Age: 69
End: 2024-12-30
Payer: OTHER GOVERNMENT

## 2024-12-30 ENCOUNTER — ANESTHESIA (OUTPATIENT)
Dept: SURGERY | Age: 69
End: 2024-12-30
Payer: OTHER GOVERNMENT

## 2024-12-30 VITALS
HEART RATE: 91 BPM | WEIGHT: 157.6 LBS | DIASTOLIC BLOOD PRESSURE: 86 MMHG | SYSTOLIC BLOOD PRESSURE: 153 MMHG | TEMPERATURE: 97.5 F | BODY MASS INDEX: 22.56 KG/M2 | HEIGHT: 70 IN | RESPIRATION RATE: 18 BRPM | OXYGEN SATURATION: 97 %

## 2024-12-30 PROBLEM — Z96.652 STATUS POST LEFT KNEE REPLACEMENT: Status: ACTIVE | Noted: 2024-12-30

## 2024-12-30 PROCEDURE — 2720000010 HC SURG SUPPLY STERILE: Performed by: ORTHOPAEDIC SURGERY

## 2024-12-30 PROCEDURE — 6360000002 HC RX W HCPCS: Performed by: NURSE ANESTHETIST, CERTIFIED REGISTERED

## 2024-12-30 PROCEDURE — 64447 NJX AA&/STRD FEMORAL NRV IMG: CPT | Performed by: ANESTHESIOLOGY

## 2024-12-30 PROCEDURE — 6360000002 HC RX W HCPCS: Performed by: ORTHOPAEDIC SURGERY

## 2024-12-30 PROCEDURE — 97535 SELF CARE MNGMENT TRAINING: CPT

## 2024-12-30 PROCEDURE — 2500000003 HC RX 250 WO HCPCS: Performed by: NURSE ANESTHETIST, CERTIFIED REGISTERED

## 2024-12-30 PROCEDURE — 2580000003 HC RX 258: Performed by: ANESTHESIOLOGY

## 2024-12-30 PROCEDURE — 2500000003 HC RX 250 WO HCPCS: Performed by: PHYSICIAN ASSISTANT

## 2024-12-30 PROCEDURE — 97530 THERAPEUTIC ACTIVITIES: CPT

## 2024-12-30 PROCEDURE — 3700000000 HC ANESTHESIA ATTENDED CARE: Performed by: ORTHOPAEDIC SURGERY

## 2024-12-30 PROCEDURE — 3600000005 HC SURGERY LEVEL 5 BASE: Performed by: ORTHOPAEDIC SURGERY

## 2024-12-30 PROCEDURE — 97165 OT EVAL LOW COMPLEX 30 MIN: CPT

## 2024-12-30 PROCEDURE — 97161 PT EVAL LOW COMPLEX 20 MIN: CPT

## 2024-12-30 PROCEDURE — 7100000000 HC PACU RECOVERY - FIRST 15 MIN: Performed by: ORTHOPAEDIC SURGERY

## 2024-12-30 PROCEDURE — 94760 N-INVAS EAR/PLS OXIMETRY 1: CPT

## 2024-12-30 PROCEDURE — 6360000002 HC RX W HCPCS: Performed by: ANESTHESIOLOGY

## 2024-12-30 PROCEDURE — 6370000000 HC RX 637 (ALT 250 FOR IP): Performed by: PHYSICIAN ASSISTANT

## 2024-12-30 PROCEDURE — 6360000002 HC RX W HCPCS: Performed by: PHYSICIAN ASSISTANT

## 2024-12-30 PROCEDURE — C1776 JOINT DEVICE (IMPLANTABLE): HCPCS | Performed by: ORTHOPAEDIC SURGERY

## 2024-12-30 PROCEDURE — 7100000001 HC PACU RECOVERY - ADDTL 15 MIN: Performed by: ORTHOPAEDIC SURGERY

## 2024-12-30 PROCEDURE — 3600000015 HC SURGERY LEVEL 5 ADDTL 15MIN: Performed by: ORTHOPAEDIC SURGERY

## 2024-12-30 PROCEDURE — 2709999900 HC NON-CHARGEABLE SUPPLY: Performed by: ORTHOPAEDIC SURGERY

## 2024-12-30 PROCEDURE — 3700000001 HC ADD 15 MINUTES (ANESTHESIA): Performed by: ORTHOPAEDIC SURGERY

## 2024-12-30 PROCEDURE — C1713 ANCHOR/SCREW BN/BN,TIS/BN: HCPCS | Performed by: ORTHOPAEDIC SURGERY

## 2024-12-30 PROCEDURE — 2580000003 HC RX 258: Performed by: NURSE ANESTHETIST, CERTIFIED REGISTERED

## 2024-12-30 DEVICE — TIBIAL COMPONENT
Type: IMPLANTABLE DEVICE | Site: KNEE | Status: FUNCTIONAL
Brand: TRIATHLON

## 2024-12-30 DEVICE — ASYMMETRIC PATELLA
Type: IMPLANTABLE DEVICE | Site: KNEE | Status: FUNCTIONAL
Brand: TRIATHLON

## 2024-12-30 DEVICE — TIBIAL BEARING INSERT - CS
Type: IMPLANTABLE DEVICE | Site: KNEE | Status: FUNCTIONAL
Brand: TRIATHLON

## 2024-12-30 DEVICE — CRUCIATE RETAINING FEMORAL
Type: IMPLANTABLE DEVICE | Site: KNEE | Status: FUNCTIONAL
Brand: TRIATHLON

## 2024-12-30 DEVICE — COMPONENT TOT KNEE CAPPED PRIMARY K2STRYKER] STRYKER CORP]: Type: IMPLANTABLE DEVICE | Status: FUNCTIONAL

## 2024-12-30 DEVICE — DEPUY CMW 2 FAST SET BONE CEMENT 20G: Type: IMPLANTABLE DEVICE | Site: KNEE | Status: FUNCTIONAL

## 2024-12-30 RX ORDER — GLYCOPYRROLATE 0.2 MG/ML
INJECTION INTRAMUSCULAR; INTRAVENOUS
Status: DISCONTINUED | OUTPATIENT
Start: 2024-12-30 | End: 2024-12-30 | Stop reason: SDUPTHER

## 2024-12-30 RX ORDER — HYDROMORPHONE HYDROCHLORIDE 1 MG/ML
1 INJECTION, SOLUTION INTRAMUSCULAR; INTRAVENOUS; SUBCUTANEOUS
Status: DISCONTINUED | OUTPATIENT
Start: 2024-12-30 | End: 2024-12-30 | Stop reason: HOSPADM

## 2024-12-30 RX ORDER — OXYCODONE HYDROCHLORIDE 5 MG/1
10 TABLET ORAL EVERY 4 HOURS PRN
Status: DISCONTINUED | OUTPATIENT
Start: 2024-12-30 | End: 2024-12-30 | Stop reason: HOSPADM

## 2024-12-30 RX ORDER — OXYCODONE HYDROCHLORIDE 5 MG/1
5 TABLET ORAL EVERY 4 HOURS PRN
Status: DISCONTINUED | OUTPATIENT
Start: 2024-12-30 | End: 2024-12-30 | Stop reason: HOSPADM

## 2024-12-30 RX ORDER — SENNA AND DOCUSATE SODIUM 50; 8.6 MG/1; MG/1
1 TABLET, FILM COATED ORAL 2 TIMES DAILY
Status: DISCONTINUED | OUTPATIENT
Start: 2024-12-30 | End: 2024-12-30 | Stop reason: HOSPADM

## 2024-12-30 RX ORDER — LIDOCAINE HYDROCHLORIDE 10 MG/ML
1 INJECTION, SOLUTION INFILTRATION; PERINEURAL
Status: COMPLETED | OUTPATIENT
Start: 2024-12-30 | End: 2024-12-30

## 2024-12-30 RX ORDER — FENTANYL CITRATE 50 UG/ML
INJECTION, SOLUTION INTRAMUSCULAR; INTRAVENOUS
Status: DISCONTINUED | OUTPATIENT
Start: 2024-12-30 | End: 2024-12-30 | Stop reason: SDUPTHER

## 2024-12-30 RX ORDER — SODIUM CHLORIDE 0.9 % (FLUSH) 0.9 %
5-40 SYRINGE (ML) INJECTION EVERY 12 HOURS SCHEDULED
Status: DISCONTINUED | OUTPATIENT
Start: 2024-12-30 | End: 2024-12-30 | Stop reason: HOSPADM

## 2024-12-30 RX ORDER — DIPHENHYDRAMINE HCL 25 MG
25 CAPSULE ORAL EVERY 6 HOURS PRN
Status: DISCONTINUED | OUTPATIENT
Start: 2024-12-30 | End: 2024-12-30 | Stop reason: HOSPADM

## 2024-12-30 RX ORDER — IPRATROPIUM BROMIDE AND ALBUTEROL SULFATE 2.5; .5 MG/3ML; MG/3ML
1 SOLUTION RESPIRATORY (INHALATION)
Status: DISCONTINUED | OUTPATIENT
Start: 2024-12-30 | End: 2024-12-30 | Stop reason: HOSPADM

## 2024-12-30 RX ORDER — ONDANSETRON 2 MG/ML
4 INJECTION INTRAMUSCULAR; INTRAVENOUS EVERY 6 HOURS PRN
Status: DISCONTINUED | OUTPATIENT
Start: 2024-12-30 | End: 2024-12-30 | Stop reason: HOSPADM

## 2024-12-30 RX ORDER — FENTANYL CITRATE 50 UG/ML
100 INJECTION, SOLUTION INTRAMUSCULAR; INTRAVENOUS
Status: COMPLETED | OUTPATIENT
Start: 2024-12-30 | End: 2024-12-30

## 2024-12-30 RX ORDER — HYDROMORPHONE HYDROCHLORIDE 2 MG/ML
INJECTION, SOLUTION INTRAMUSCULAR; INTRAVENOUS; SUBCUTANEOUS
Status: DISCONTINUED | OUTPATIENT
Start: 2024-12-30 | End: 2024-12-30 | Stop reason: SDUPTHER

## 2024-12-30 RX ORDER — ROPIVACAINE HYDROCHLORIDE 2 MG/ML
INJECTION, SOLUTION EPIDURAL; INFILTRATION; PERINEURAL
Status: COMPLETED | OUTPATIENT
Start: 2024-12-30 | End: 2024-12-30

## 2024-12-30 RX ORDER — HALOPERIDOL 5 MG/ML
1 INJECTION INTRAMUSCULAR
Status: DISCONTINUED | OUTPATIENT
Start: 2024-12-30 | End: 2024-12-30 | Stop reason: HOSPADM

## 2024-12-30 RX ORDER — ROCURONIUM BROMIDE 10 MG/ML
INJECTION, SOLUTION INTRAVENOUS
Status: DISCONTINUED | OUTPATIENT
Start: 2024-12-30 | End: 2024-12-30 | Stop reason: SDUPTHER

## 2024-12-30 RX ORDER — DEXAMETHASONE SODIUM PHOSPHATE 10 MG/ML
INJECTION, SOLUTION INTRAMUSCULAR; INTRAVENOUS
Status: COMPLETED | OUTPATIENT
Start: 2024-12-30 | End: 2024-12-30

## 2024-12-30 RX ORDER — KETAMINE HCL IN NACL, ISO-OSM 20 MG/2 ML
SYRINGE (ML) INJECTION
Status: DISCONTINUED | OUTPATIENT
Start: 2024-12-30 | End: 2024-12-30 | Stop reason: SDUPTHER

## 2024-12-30 RX ORDER — SODIUM CHLORIDE 9 MG/ML
INJECTION, SOLUTION INTRAVENOUS PRN
Status: DISCONTINUED | OUTPATIENT
Start: 2024-12-30 | End: 2024-12-30 | Stop reason: HOSPADM

## 2024-12-30 RX ORDER — TRANEXAMIC ACID 100 MG/ML
INJECTION, SOLUTION INTRAVENOUS
Status: DISCONTINUED | OUTPATIENT
Start: 2024-12-30 | End: 2024-12-30 | Stop reason: SDUPTHER

## 2024-12-30 RX ORDER — PROPOFOL 10 MG/ML
INJECTION, EMULSION INTRAVENOUS
Status: DISCONTINUED | OUTPATIENT
Start: 2024-12-30 | End: 2024-12-30 | Stop reason: SDUPTHER

## 2024-12-30 RX ORDER — PROCHLORPERAZINE EDISYLATE 5 MG/ML
5 INJECTION INTRAMUSCULAR; INTRAVENOUS
Status: DISCONTINUED | OUTPATIENT
Start: 2024-12-30 | End: 2024-12-30 | Stop reason: HOSPADM

## 2024-12-30 RX ORDER — NALOXONE HYDROCHLORIDE 0.4 MG/ML
INJECTION, SOLUTION INTRAMUSCULAR; INTRAVENOUS; SUBCUTANEOUS PRN
Status: DISCONTINUED | OUTPATIENT
Start: 2024-12-30 | End: 2024-12-30 | Stop reason: HOSPADM

## 2024-12-30 RX ORDER — ACETAMINOPHEN 500 MG
1000 TABLET ORAL EVERY 6 HOURS
Status: DISCONTINUED | OUTPATIENT
Start: 2024-12-30 | End: 2024-12-30 | Stop reason: HOSPADM

## 2024-12-30 RX ORDER — SODIUM CHLORIDE, SODIUM LACTATE, POTASSIUM CHLORIDE, CALCIUM CHLORIDE 600; 310; 30; 20 MG/100ML; MG/100ML; MG/100ML; MG/100ML
INJECTION, SOLUTION INTRAVENOUS CONTINUOUS
Status: DISCONTINUED | OUTPATIENT
Start: 2024-12-30 | End: 2024-12-30 | Stop reason: HOSPADM

## 2024-12-30 RX ORDER — OXYCODONE HYDROCHLORIDE 5 MG/1
5 TABLET ORAL
Status: DISCONTINUED | OUTPATIENT
Start: 2024-12-30 | End: 2024-12-30 | Stop reason: HOSPADM

## 2024-12-30 RX ORDER — ACETAMINOPHEN 500 MG
1000 TABLET ORAL ONCE
Status: DISCONTINUED | OUTPATIENT
Start: 2024-12-30 | End: 2024-12-30 | Stop reason: SDUPTHER

## 2024-12-30 RX ORDER — ONDANSETRON 4 MG/1
4 TABLET, ORALLY DISINTEGRATING ORAL EVERY 8 HOURS PRN
Status: DISCONTINUED | OUTPATIENT
Start: 2024-12-30 | End: 2024-12-30 | Stop reason: HOSPADM

## 2024-12-30 RX ORDER — LIDOCAINE HYDROCHLORIDE 20 MG/ML
INJECTION, SOLUTION EPIDURAL; INFILTRATION; INTRACAUDAL; PERINEURAL
Status: DISCONTINUED | OUTPATIENT
Start: 2024-12-30 | End: 2024-12-30 | Stop reason: SDUPTHER

## 2024-12-30 RX ORDER — METHOCARBAMOL 750 MG/1
750 TABLET, FILM COATED ORAL EVERY 8 HOURS PRN
Status: DISCONTINUED | OUTPATIENT
Start: 2024-12-30 | End: 2024-12-30 | Stop reason: HOSPADM

## 2024-12-30 RX ORDER — ALBUTEROL SULFATE 0.83 MG/ML
2.5 SOLUTION RESPIRATORY (INHALATION) EVERY 6 HOURS PRN
Status: DISCONTINUED | OUTPATIENT
Start: 2024-12-30 | End: 2024-12-30 | Stop reason: HOSPADM

## 2024-12-30 RX ORDER — SODIUM CHLORIDE 9 MG/ML
INJECTION, SOLUTION INTRAVENOUS
Status: DISCONTINUED | OUTPATIENT
Start: 2024-12-30 | End: 2024-12-30 | Stop reason: SDUPTHER

## 2024-12-30 RX ORDER — EPHEDRINE SULFATE 5 MG/ML
INJECTION INTRAVENOUS
Status: DISCONTINUED | OUTPATIENT
Start: 2024-12-30 | End: 2024-12-30 | Stop reason: SDUPTHER

## 2024-12-30 RX ORDER — KETOROLAC TROMETHAMINE 30 MG/ML
INJECTION, SOLUTION INTRAMUSCULAR; INTRAVENOUS PRN
Status: DISCONTINUED | OUTPATIENT
Start: 2024-12-30 | End: 2024-12-30 | Stop reason: ALTCHOICE

## 2024-12-30 RX ORDER — ONDANSETRON 2 MG/ML
INJECTION INTRAMUSCULAR; INTRAVENOUS
Status: DISCONTINUED | OUTPATIENT
Start: 2024-12-30 | End: 2024-12-30 | Stop reason: SDUPTHER

## 2024-12-30 RX ORDER — SODIUM CHLORIDE, SODIUM LACTATE, POTASSIUM CHLORIDE, CALCIUM CHLORIDE 600; 310; 30; 20 MG/100ML; MG/100ML; MG/100ML; MG/100ML
INJECTION, SOLUTION INTRAVENOUS
Status: DISCONTINUED | OUTPATIENT
Start: 2024-12-30 | End: 2024-12-30 | Stop reason: SDUPTHER

## 2024-12-30 RX ORDER — DIPHENHYDRAMINE HYDROCHLORIDE 50 MG/ML
25 INJECTION INTRAMUSCULAR; INTRAVENOUS EVERY 6 HOURS PRN
Status: DISCONTINUED | OUTPATIENT
Start: 2024-12-30 | End: 2024-12-30 | Stop reason: HOSPADM

## 2024-12-30 RX ORDER — SODIUM CHLORIDE 0.9 % (FLUSH) 0.9 %
5-40 SYRINGE (ML) INJECTION PRN
Status: DISCONTINUED | OUTPATIENT
Start: 2024-12-30 | End: 2024-12-30 | Stop reason: HOSPADM

## 2024-12-30 RX ORDER — VANCOMYCIN HYDROCHLORIDE 1 G/20ML
INJECTION, POWDER, LYOPHILIZED, FOR SOLUTION INTRAVENOUS PRN
Status: DISCONTINUED | OUTPATIENT
Start: 2024-12-30 | End: 2024-12-30 | Stop reason: ALTCHOICE

## 2024-12-30 RX ORDER — ACETAMINOPHEN 500 MG
1000 TABLET ORAL ONCE
Status: DISCONTINUED | OUTPATIENT
Start: 2024-12-30 | End: 2024-12-30 | Stop reason: HOSPADM

## 2024-12-30 RX ORDER — NEOSTIGMINE METHYLSULFATE 1 MG/ML
INJECTION, SOLUTION INTRAVENOUS
Status: DISCONTINUED | OUTPATIENT
Start: 2024-12-30 | End: 2024-12-30 | Stop reason: SDUPTHER

## 2024-12-30 RX ORDER — ROPIVACAINE HYDROCHLORIDE 2 MG/ML
INJECTION, SOLUTION EPIDURAL; INFILTRATION; PERINEURAL PRN
Status: DISCONTINUED | OUTPATIENT
Start: 2024-12-30 | End: 2024-12-30 | Stop reason: ALTCHOICE

## 2024-12-30 RX ORDER — MIDAZOLAM HYDROCHLORIDE 2 MG/2ML
2 INJECTION, SOLUTION INTRAMUSCULAR; INTRAVENOUS
Status: COMPLETED | OUTPATIENT
Start: 2024-12-30 | End: 2024-12-30

## 2024-12-30 RX ORDER — ASPIRIN 81 MG/1
81 TABLET ORAL 2 TIMES DAILY
Status: DISCONTINUED | OUTPATIENT
Start: 2024-12-30 | End: 2024-12-30 | Stop reason: HOSPADM

## 2024-12-30 RX ORDER — METOPROLOL SUCCINATE 25 MG/1
25 TABLET, EXTENDED RELEASE ORAL DAILY
Status: CANCELLED | OUTPATIENT
Start: 2024-12-30

## 2024-12-30 RX ORDER — SODIUM CHLORIDE, SODIUM LACTATE, POTASSIUM CHLORIDE, CALCIUM CHLORIDE 600; 310; 30; 20 MG/100ML; MG/100ML; MG/100ML; MG/100ML
INJECTION, SOLUTION INTRAVENOUS CONTINUOUS
Status: DISCONTINUED | OUTPATIENT
Start: 2024-12-30 | End: 2024-12-30

## 2024-12-30 RX ORDER — DEXAMETHASONE SODIUM PHOSPHATE 10 MG/ML
INJECTION INTRAMUSCULAR; INTRAVENOUS
Status: DISCONTINUED | OUTPATIENT
Start: 2024-12-30 | End: 2024-12-30 | Stop reason: SDUPTHER

## 2024-12-30 RX ADMIN — HYDROMORPHONE HYDROCHLORIDE 0.5 MG: 2 INJECTION INTRAMUSCULAR; INTRAVENOUS; SUBCUTANEOUS at 10:09

## 2024-12-30 RX ADMIN — GLYCOPYRROLATE 0.4 MG: 0.2 INJECTION INTRAMUSCULAR; INTRAVENOUS at 09:59

## 2024-12-30 RX ADMIN — HYDROMORPHONE HYDROCHLORIDE 0.5 MG: 2 INJECTION INTRAMUSCULAR; INTRAVENOUS; SUBCUTANEOUS at 10:29

## 2024-12-30 RX ADMIN — PHENYLEPHRINE HYDROCHLORIDE 100 MCG: 0.1 INJECTION, SOLUTION INTRAVENOUS at 08:29

## 2024-12-30 RX ADMIN — FENTANYL CITRATE 50 MCG: 50 INJECTION INTRAMUSCULAR; INTRAVENOUS at 07:38

## 2024-12-30 RX ADMIN — DEXAMETHASONE SODIUM PHOSPHATE 4 MG: 10 INJECTION, SOLUTION INTRAMUSCULAR; INTRAVENOUS at 07:38

## 2024-12-30 RX ADMIN — Medication 3 MG: at 09:59

## 2024-12-30 RX ADMIN — ROCURONIUM BROMIDE 50 MG: 10 INJECTION, SOLUTION INTRAVENOUS at 08:26

## 2024-12-30 RX ADMIN — PROPOFOL 150 MG: 10 INJECTION, EMULSION INTRAVENOUS at 08:25

## 2024-12-30 RX ADMIN — LIDOCAINE HYDROCHLORIDE 1 ML: 10 INJECTION, SOLUTION INFILTRATION; PERINEURAL at 06:45

## 2024-12-30 RX ADMIN — LIDOCAINE HYDROCHLORIDE 80 MG: 20 INJECTION, SOLUTION EPIDURAL; INFILTRATION; INTRACAUDAL; PERINEURAL at 08:25

## 2024-12-30 RX ADMIN — ONDANSETRON 8 MG: 2 INJECTION INTRAMUSCULAR; INTRAVENOUS at 10:00

## 2024-12-30 RX ADMIN — Medication 20 MG: at 08:25

## 2024-12-30 RX ADMIN — HYDROMORPHONE HYDROCHLORIDE 0.5 MG: 2 INJECTION INTRAMUSCULAR; INTRAVENOUS; SUBCUTANEOUS at 10:01

## 2024-12-30 RX ADMIN — SODIUM CHLORIDE, POTASSIUM CHLORIDE, SODIUM LACTATE AND CALCIUM CHLORIDE: 600; 310; 30; 20 INJECTION, SOLUTION INTRAVENOUS at 06:44

## 2024-12-30 RX ADMIN — EPHEDRINE SULFATE 10 MG: 5 INJECTION INTRAVENOUS at 08:51

## 2024-12-30 RX ADMIN — TRANEXAMIC ACID 1000 MG: 100 INJECTION, SOLUTION INTRAVENOUS at 09:49

## 2024-12-30 RX ADMIN — ACETAMINOPHEN 1000 MG: 500 TABLET, FILM COATED ORAL at 12:43

## 2024-12-30 RX ADMIN — PHENYLEPHRINE HYDROCHLORIDE 100 MCG: 0.1 INJECTION, SOLUTION INTRAVENOUS at 08:32

## 2024-12-30 RX ADMIN — SODIUM CHLORIDE: 9 INJECTION, SOLUTION INTRAVENOUS at 09:10

## 2024-12-30 RX ADMIN — MIDAZOLAM 2 MG: 1 INJECTION INTRAMUSCULAR; INTRAVENOUS at 07:38

## 2024-12-30 RX ADMIN — FENTANYL CITRATE 50 MCG: 50 INJECTION, SOLUTION INTRAMUSCULAR; INTRAVENOUS at 08:25

## 2024-12-30 RX ADMIN — FENTANYL CITRATE 50 MCG: 50 INJECTION, SOLUTION INTRAMUSCULAR; INTRAVENOUS at 09:05

## 2024-12-30 RX ADMIN — TRANEXAMIC ACID 1000 MG: 100 INJECTION, SOLUTION INTRAVENOUS at 08:45

## 2024-12-30 RX ADMIN — DEXAMETHASONE SODIUM PHOSPHATE 10 MG: 10 INJECTION INTRAMUSCULAR; INTRAVENOUS at 09:09

## 2024-12-30 RX ADMIN — CEFAZOLIN 2000 MG: 2 INJECTION, POWDER, FOR SOLUTION INTRAMUSCULAR; INTRAVENOUS at 08:45

## 2024-12-30 RX ADMIN — EPHEDRINE SULFATE 20 MG: 5 INJECTION INTRAVENOUS at 08:32

## 2024-12-30 RX ADMIN — SODIUM CHLORIDE, SODIUM LACTATE, POTASSIUM CHLORIDE, AND CALCIUM CHLORIDE: 600; 310; 30; 20 INJECTION, SOLUTION INTRAVENOUS at 08:11

## 2024-12-30 RX ADMIN — ROPIVACAINE HYDROCHLORIDE 20 ML: 2 INJECTION, SOLUTION EPIDURAL; INFILTRATION at 07:38

## 2024-12-30 RX ADMIN — Medication 20 MG: at 09:00

## 2024-12-30 RX ADMIN — HYDROMORPHONE HYDROCHLORIDE 0.5 MG: 2 INJECTION INTRAMUSCULAR; INTRAVENOUS; SUBCUTANEOUS at 09:57

## 2024-12-30 RX ADMIN — PROPOFOL 50 MG: 10 INJECTION, EMULSION INTRAVENOUS at 09:08

## 2024-12-30 ASSESSMENT — PAIN - FUNCTIONAL ASSESSMENT: PAIN_FUNCTIONAL_ASSESSMENT: 0-10

## 2024-12-30 NOTE — INTERVAL H&P NOTE
Update History & Physical    The patient's History and Physical of December 10, 2024 was reviewed with the patient and I examined the patient. There was no change. The surgical site was confirmed by the patient and me.     Plan: The risks, benefits, expected outcome, and alternative to the recommended procedure have been discussed with the patient. Patient understands and wants to proceed with the procedure.     Electronically signed by LAURITA GRANADOS JR, MD on 12/30/2024 at 6:35 AM

## 2024-12-30 NOTE — RT PROTOCOL NOTE
frequently as needed for the individual patient.     II. Purpose:     To provide a process that will allow for ongoing assessment and care plan modification for patients receiving respiratory services based on both objective and or subjective patient responses to interventions. This process of protocol utilization will assist in patient care progression while eliminating the need for the physician to continually update respiratory therapy orders.   To assure continuity of respiratory care that meets [facility] Clinical Practice Guidelines.    III. Initiation:  Implement Respiratory Care Protocols for patients who are ordered by physician          to receive respiratory therapy procedures or for ventilator management.     IV. Protocol:  Upon receiving an order for therapy the RCP will review the patient's EMR (electronic medical record) for all pertinent information including:  Physician's order for therapy  Patient history and physical examination  Physician progress notes  Diagnostic. X-rays, PFT's, arterial blood gases etc.  The RCP will perform a respiratory assessment in the following manner:  General observations: color, pattern and effort of breathing, chest expansion, (symmetrical and bilateral), level of consciousness and the ability to ambulate.  The RCP will assess patient's cough ability and determine if bronchial hygiene is needed. If patient is unable to produce sputum, at that time, the RCP should question the patient with regard to their sputum: production, color consistency, frequency and amount.  Auscultation: Using a stethoscope, the RCP will listen and note quality of breath sounds and presence or absence of adventitious breath sounds in all lung fields, both anteriorly and posteriorly.  Upon completing the EMR review and physical assessment, the RCP will document findings in the “RT Assessment section” of the EMR. The score level will be provided and will be used to determine the frequency of

## 2024-12-30 NOTE — ANESTHESIA PROCEDURE NOTES
Peripheral Block    Patient location during procedure: pre-op  Reason for block: post-op pain management and at surgeon's request  Start time: 12/30/2024 7:38 AM  End time: 12/30/2024 7:41 AM  Staffing  Performed: anesthesiologist   Anesthesiologist: Duke Castellano MD  Performed by: Duke Castellano MD  Authorized by: Duke Castellano MD    Preanesthetic Checklist  Completed: patient identified, IV checked, site marked, risks and benefits discussed, surgical/procedural consents, equipment checked, pre-op evaluation, timeout performed, anesthesia consent given, oxygen available and monitors applied/VS acknowledged  Peripheral Block   Patient position: supine  Prep: ChloraPrep  Provider prep: mask  Patient monitoring: cardiac monitor, continuous pulse ox, frequent blood pressure checks, IV access and oxygen  Block type: Femoral  Adductor canal  Laterality: left  Injection technique: single-shot  Guidance: ultrasound guided    Needle   Needle type: insulated echogenic nerve stimulator needle   Needle gauge: 21 G  Needle localization: ultrasound guidance  Needle length: 10 cm  Assessment   Injection assessment: negative aspiration for heme, no paresthesia on injection, local visualized surrounding nerve on ultrasound and no intravascular symptoms  Paresthesia pain: none  Slow fractionated injection: yes  Hemodynamics: stable  Outcomes: patient tolerated procedure well and uncomplicated    Additional Notes  -Block placed for post op pain at surgeon's request.     -Ultrasound used to identify anatomy of nerve bundle.    -Needle placement and local injection at perineural area confirmed with real time ultrasound guidance.     -Local visualized with ultrasound surrounding nerve.    -Permanent Image taken and placed on chart.      Medications Administered  dexAMETHasone (DECADRON) (PF) 10 mg/mL injection - Other   4 mg - 12/30/2024 7:38:00 AM  ropivacaine (NAROPIN) injection 0.2% - Perineural   20 mL - 12/30/2024 7:38:00

## 2024-12-30 NOTE — ANESTHESIA PRE PROCEDURE
Department of Anesthesiology  Preprocedure Note       Name:  Aubrey Smith Jr.   Age:  69 y.o.  :  1955                                          MRN:  [id number]         Date:  2024      Surgeon: Surgeon(s):  Angel Goodwin Jr., MD    Procedure: Procedure(s):  lt  KNEE TOTAL ARTHROPLASTY ROBOTIC SDD    Medications prior to admission:   Prior to Admission medications    Medication Sig Start Date End Date Taking? Authorizing Provider   Multiple Vitamin (MULTIVITAMIN ADULT PO) Take 1 tablet by mouth daily   Yes Yesy Kohli MD   diclofenac sodium (VOLTAREN) 1 % GEL Apply topically 4 times daily as needed for Pain   Yes Yesy Kohli MD   metoprolol succinate (TOPROL XL) 50 MG extended release tablet Take 0.5 tablets by mouth daily Takes 25 mg tablet daily   Yes Yesy Kohli MD   morphine (MSIR) 15 MG tablet Take 1 tablet by mouth in the morning and at bedtime. BID   Yes Yesy Kohli MD   CALCIUM PO Take 600 mg by mouth every evening   Yes Yesy Kohli MD   CREATINE MONOHYDRATE PO Take by mouth   Yes Yesy Kohli MD   Whey Protein POWD Take by mouth   Yes Yesy Kohli MD   magnesium oxide (MAG-OX) 400 MG tablet Take 1 tablet by mouth every evening   Yes Yesy Kohli MD   acetaminophen (TYLENOL) 500 MG tablet Take 2 tablets by mouth every 6 hours as needed for Pain   Yes Automatic Reconciliation, Ar   melatonin 3 MG TABS tablet Take 1 tablet by mouth nightly as needed   Yes Automatic Reconciliation, Ar   aspirin (ASPIRIN 81) 81 MG EC tablet Take 1 tablet by mouth in the morning and at bedtime 24  Funmilayo Caban PA   methocarbamol (ROBAXIN-750) 750 MG tablet Take 1 tablet by mouth 3 times daily as needed (muscle spasm or cramps) 24   Funmilayo Caban PA   promethazine (PHENERGAN) 12.5 MG tablet Take 1 tablet by mouth 4 times daily as needed for Nausea 24   Funmilayo Caban PA   oxyCODONE

## 2024-12-30 NOTE — PROGRESS NOTES
4 Eyes Skin Assessment     NAME:  Aubrey Smith Jr.  YOB: 1955  MEDICAL RECORD NUMBER:  [id number]    The patient is being assessed for  Post-Op Surgical    I agree that at least one RN has performed a thorough Head to Toe Skin Assessment on the patient. ALL assessment sites listed below have been assessed.      Areas assessed by both nurses:    Head, Face, Ears, Shoulders, Back, Chest, Arms, Elbows, Hands, Sacrum. Buttock, Coccyx, Ischium, Legs. Feet and Heels, and Under Medical Devices         Does the Patient have a Wound? No noted wound(s)       Mustapha Prevention initiated by RN: Yes  Wound Care Orders initiated by RN: No    Pressure Injury (Stage 3,4, Unstageable, DTI, NWPT, and Complex wounds) if present, place Wound referral order by RN under : No    New Ostomies, if present place, Ostomy referral order under : No     Nurse 1 eSignature: Electronically signed by DANIELLE WILBURN RN on 12/30/24 at 11:32 AM EST    **SHARE this note so that the co-signing nurse can place an eSignature**    Nurse 2 eSignature: Electronically signed by Radha Hancock RN on 12/30/24 at 11:48 AM EST   
Discharge instructions reviewed and copy provided for pt. Opportunity provided for questions. Pt. verbalized understanding. IV was removed without difficulty. Pt taken to car via wc.  
Pt received to Rm 335. Pt alert and oriented. Oriented pt to room and bed controls.  
TRANSFER - IN REPORT:    Verbal report received from Mohan blanco Aubrey Laguerre Sarah Kwong  being received from PACU for routine post-op      Report consisted of patient's Situation, Background, Assessment and   Recommendations(SBAR).     Information from the following report(s) Nurse Handoff Report was reviewed with the receiving nurse.    Opportunity for questions and clarification was provided.      Assessment completed upon patient's arrival to unit and care assumed.     
ntains abnormal data Hemoglobin A1c  Order: [id number]  Status: Final result       Visible to patient: Yes (seen)       Next appt: 12/19/2024 at 08:15 AM in Radiology ([facility])       Dx: Primary osteoarthritis of left knee    0 Result Notes       1  Topic        Component  Ref Range & Units 12/16/24 1341 9/10/24 0916 5/7/21   Hemoglobin A1C  0 - 5.6 % 5.7 High  5.8 High  CM 5.2% R   Comment: Reference Range  Normal       <5.7%  Prediabetes  5.7-6.4%  Diabetes     >6.4%   Estimated Avg Glucose  mg/dL 117 121    Resulting Agency [facility]              Specimen Collected: 12/16/24 13:41 EST Last Resulted: 12/16/24 17:47 EST        
Dynamic [x] [] [] [] [] []              Standing Static [x] [x] [] [] [] [] With RW   Standing Dynamic [] [x] [x] [] [] [] With RW     PLAN:   FREQUENCY AND DURATION: BID for duration of hospital stay or until stated goals are met, whichever comes first.    THERAPY PROGNOSIS: Good    PROBLEM LIST:   (Skilled intervention is medically necessary to address:)  Decreased ADL/Functional Activities, Decreased Activity Tolerance, Decreased AROM/PROM, Decreased Gait Ability, Decreased Strength, Decreased Transfer Abilities, and Increased Pain   INTERVENTIONS PLANNED:   (Benefits and precautions of physical therapy have been discussed with the patient.)  Therapeutic Activity, Therapeutic Exercise/HEP, Gait Training, Modalities, and Education       TREATMENT:   EVALUATION: LOW COMPLEXITY: (Untimed Charge)  The initial evaluation charge encompasses clinical chart review, objective assessment, interpretation of assessment, and skilled monitoring of the patient's response to treatment in order to develop a plan of care.     TREATMENT:   Therapeutic Activity (25 Minutes): Therapeutic activity included Transfer Training, Ambulation on level ground, Stair Training, Sitting balance , Standing balance, and exercises to improve functional Activity tolerance, Balance, Coordination, Mobility, Strength, and ROM.    TREATMENT GRID:  THERAPEUTIC  EXERCISES: DATE:  12/30 DATE:   DATE:      AM PM AM PM AM PM    [] [] [] [] [] []   Ankle Pumps  10       Quad Sets  10       Gluteal Sets  10       Hip Abd/ADduction  10       Straight Leg Raises  10       Knee Slides  10       Short Arc Quads  10       Chair Slides  10                         B = bilateral; AA = active assistive; A = active; P = passive      EDUCATION:  [x] Home Exercises  [x] Fall Precautions  [x] No Pillow Under Knee  [x] D/C Instruction Review [x] Cryocuff  [x] Walker Management/Safety  [] Adaptive Equipment as Needed     Interdisciplinary Patient Education   (Reference 
toileting activity with  contact guard assist to demonstrate improved functional mobility and safety.  4.  Mr. Smith will perform all functional transfers transfer with contact guard assist to demonstrate improved functional mobility and safety.      PROBLEM LIST:   (Skilled intervention is medically necessary to address:)  Decreased ADL/Functional Activities  Decreased Activity Tolerance  Decreased Balance  Decreased Gait Ability  Decreased Safety Awareness  Decreased Transfer Abilities  Increased Pain   INTERVENTIONS PLANNED:   (Benefits and precautions of occupational therapy have been discussed with the patient.)  Self Care Training  Therapeutic Activity  Therapeutic Exercise/HEP  Neuromuscular Re-education  Manual Therapy  Education         TREATMENT:     EVALUATION: LOW COMPLEXITY: (Untimed Charge)    TREATMENT:   Self Care (18 minutes): Patient participated in upper body dressing, lower body dressing, personal device care, grooming, functional mobility, bed mobility, functional transfer, bathroom mobility, energy conservation, assistive device, and compensatory technique in unsupported sitting and standing with minimal verbal, manual, and tactile cueing to increase independence, increase activity tolerance, and increase safety awareness. The patient was educated on role of occupational therapy, compensatory technique during ADL , strategies to improve safety, proper use of assistive device, recommended equipment, transfer training and safety, and energy conservation techniques during ADL/IADLS and patient verbalized understanding and demonstrated understanding.     AFTER TREATMENT PRECAUTIONS: Bed/Chair Locked, Call light within reach, Needs within reach, RN notified, and Visitors at bedside    INTERDISCIPLINARY COLLABORATION:  RN/ PCT and PT/ PTA    Interdisciplinary Patient Education  (Reference education tab)    [x] Safe And Effective Hygiene  [x] Fall Precautions  [x] Precautions  [x] D/C Instruction

## 2024-12-30 NOTE — CARE COORDINATION
Patient is a 69 y.o. year old male admitted for Left TKA . Patient plans to return home on discharge. Order received to arrange home health. Patient interested in [facility] who he used this year after his last Joint replacement. Pt listed as [facility] but gave permission to use his Medicare if needed. Referral sent to [facility]. Patient denies any equipment needs as patient has a walker.  Will follow until discharge.      12/30/24 0026   Service Assessment   Patient Orientation Alert and Oriented   Cognition Alert   History Provided By Patient   Services At/After Discharge   Transition of Care Consult (CM Consult) Home Health   Internal Home Health No   Reason Outside Agency Chosen Managed care specific requirement  ([facility])   Services At/After Discharge Home Health;PT   Mode of Transport at Discharge Self   Confirm Follow Up Transport Self   Condition of Participation: Discharge Planning   The Plan for Transition of Care is related to the following treatment goals: improve mobility   The Patient and/or Patient Representative was provided with a Choice of Provider? Patient   The Patient and/Or Patient Representative agree with the Discharge Plan? Yes   Freedom of Choice list was provided with basic dialogue that supports the patient's individualized plan of care/goals, treatment preferences, and shares the quality data associated with the providers?  Yes

## 2024-12-30 NOTE — OP NOTE
[id number] HOW- [id number] Left 1 Implanted         Signed By: Angel Goodwin MD   12/30/2024,  10:00 AM

## 2024-12-30 NOTE — ANESTHESIA POSTPROCEDURE EVALUATION
Department of Anesthesiology  Postprocedure Note    Patient: Aubrey Smith Jr.  MRN: [id number]  YOB: 1955  Date of evaluation: 12/30/2024    Procedure Summary       Date: 12/30/24 [place] / [place] [facility] [place] [address] [place] [address] MAIN OR    Anesthesia Start: 0811 Anesthesia Stop: 1044    Procedure: LEFT KNEE TOTAL ARTHROPLASTY ROBOTIC (Left: Knee) Diagnosis:       Primary osteoarthritis of left knee      (Primary osteoarthritis of left knee [M17.12])    Surgeons: Angel Goodwin Jr., MD Responsible Provider: Duke Castellano MD    Anesthesia Type: General ASA Status: 3            Anesthesia Type: General    Rhea Phase I: Rhea Score: 10    Rhea Phase II:      Anesthesia Post Evaluation    Patient location during evaluation: PACU  Patient participation: complete - patient participated  Level of consciousness: awake  Airway patency: patent  Nausea & Vomiting: no nausea  Cardiovascular status: hemodynamically stable  Respiratory status: acceptable and nonlabored ventilation  Hydration status: stable  Multimodal analgesia pain management approach    No notable events documented.

## 2024-12-30 NOTE — PERIOP NOTE
TRANSFER - OUT REPORT:    Verbal report given to Jean RN on Aubrey Smith Jr.  being transferred to [facility] for routine progression of patient care       Report consisted of patient's Situation, Background, Assessment and   Recommendations(SBAR).     Information from the following report(s) Nurse Handoff Report, Adult Overview, MAR, and Cardiac Rhythm NSR  was reviewed with the receiving nurse.           Lines:   Peripheral IV 12/30/24 Posterior;Right Wrist (Active)   Site Assessment Clean, dry & intact 12/30/24 1107   Line Status Infusing 12/30/24 1107   Phlebitis Assessment No symptoms 12/30/24 1107   Infiltration Assessment 0 12/30/24 1107   Dressing Status Clean, dry & intact 12/30/24 1107   Dressing Type Transparent 12/30/24 1107        Opportunity for questions and clarification was provided.      Patient transported with:  O2 @ 0lpm

## 2025-01-16 ENCOUNTER — TELEPHONE (OUTPATIENT)
Dept: ORTHOPEDIC SURGERY | Age: 70
End: 2025-01-16

## 2025-01-16 DIAGNOSIS — Z96.652 HX OF TOTAL KNEE ARTHROPLASTY, LEFT: Primary | ICD-10-CM

## 2025-01-16 NOTE — TELEPHONE ENCOUNTER
Patient will be ready for discharge next week and needs an outpatient referral sent to [facility] in [place]. Their fax is [phone]

## 2025-01-21 ENCOUNTER — TELEPHONE (OUTPATIENT)
Dept: ORTHOPEDIC SURGERY | Age: 70
End: 2025-01-21

## 2025-01-21 NOTE — TELEPHONE ENCOUNTER
Patient is scheduled for therapy at [facility] in [place] 1/28 but they don't have VA authorization.  [phone] fax [phone]

## 2025-01-21 NOTE — TELEPHONE ENCOUNTER
Confirmed fax number and since we have not been able to get it through, I will email the order to [email or web address]

## 2025-01-21 NOTE — TELEPHONE ENCOUNTER
He called last week and asked for an outpt referral to be sent to [facility] in [place]. They did not get it. Can you fax to [phone] please.

## 2025-01-22 NOTE — TELEPHONE ENCOUNTER
Filled out new request for services for physical therapy post left knee replacement to [facility] fax #[phone]    Called patient and confirmed form fax.

## 2025-01-28 ENCOUNTER — TELEPHONE (OUTPATIENT)
Dept: ORTHOPEDIC SURGERY | Age: 70
End: 2025-01-28

## 2025-01-28 NOTE — TELEPHONE ENCOUNTER
Lexie from [facility] is calling to say that a patient is there now for a visit and they do not have a VA authorization for today's visit.  They ask if the VA authorization can please be faxed to them at [phone].

## 2025-01-28 NOTE — TELEPHONE ENCOUNTER
We have faxed the request for services form to the VA but have not received return correspondence.

## 2025-02-06 ENCOUNTER — OFFICE VISIT (OUTPATIENT)
Dept: ORTHOPEDIC SURGERY | Age: 70
End: 2025-02-06

## 2025-02-06 DIAGNOSIS — Z96.652 STATUS POST LEFT KNEE REPLACEMENT: Primary | ICD-10-CM

## 2025-02-06 PROCEDURE — 99024 POSTOP FOLLOW-UP VISIT: CPT | Performed by: ORTHOPAEDIC SURGERY

## 2025-02-06 NOTE — PROGRESS NOTES
Post-op TKA Visit      02/06/25     No Known Allergies  Current Outpatient Medications on File Prior to Visit   Medication Sig Dispense Refill    aspirin (ASPIRIN 81) 81 MG EC tablet Take 1 tablet by mouth in the morning and at bedtime 70 tablet 0    methocarbamol (ROBAXIN-750) 750 MG tablet Take 1 tablet by mouth 3 times daily as needed (muscle spasm or cramps) 40 tablet 1    promethazine (PHENERGAN) 12.5 MG tablet Take 1 tablet by mouth 4 times daily as needed for Nausea 20 tablet 2    diclofenac sodium (VOLTAREN) 1 % GEL Apply topically 4 times daily as needed for Pain      celecoxib (CELEBREX) 200 MG capsule Take 1 capsule by mouth 2 times daily 60 capsule 0    metoprolol succinate (TOPROL XL) 50 MG extended release tablet Take 0.5 tablets by mouth daily Takes 25 mg tablet daily      polyethylene glycol (GLYCOLAX) 17 g packet Take 1 packet by mouth daily as needed for Constipation      morphine (MSIR) 15 MG tablet Take 1 tablet by mouth in the morning and at bedtime. BID      CALCIUM PO Take 600 mg by mouth every evening      CREATINE MONOHYDRATE PO Take by mouth      Whey Protein POWD Take by mouth      magnesium oxide (MAG-OX) 400 MG tablet Take 1 tablet by mouth every evening      acetaminophen (TYLENOL) 500 MG tablet Take 2 tablets by mouth every 6 hours as needed for Pain      melatonin 3 MG TABS tablet Take 1 tablet by mouth nightly as needed       No current facility-administered medications on file prior to visit.        5 weeks Status Post left TKA     History: The patient returns today for post-op visit following left TKA.   Their pain is improving. They are ambulating without any walking aid. They have completed home physical therapy and started outpatient therapy which is going well.   They are pleased with their results thus far. Denies any new complaints today.     Physical Exam:  The patient's incision is well healed. There is mild swelling and mild increased warmth. ROM is 0 to 125 degrees. There

## 2025-02-11 ENCOUNTER — TELEPHONE (OUTPATIENT)
Dept: ORTHOPEDIC SURGERY | Age: 70
End: 2025-02-11

## 2025-02-11 NOTE — TELEPHONE ENCOUNTER
Lexie from [facility] is calling to find out if the patient's PT referral order has been approved yet from the RFS form that was sent to the [facility] so that the patient can continue post op rehab PT.  She asks for a return call please at [phone].

## 2025-02-12 ENCOUNTER — TELEPHONE (OUTPATIENT)
Dept: ORTHOPEDIC SURGERY | Age: 70
End: 2025-02-12

## 2025-02-12 NOTE — TELEPHONE ENCOUNTER
As per I talked with Emerita [facility], VA will not cover extra PT for Lt. Knee. As per VA pt. Need to schedule with [facility] and VA nurse called and informed pt. For this and pt. Is aware of this.

## 2025-02-12 NOTE — TELEPHONE ENCOUNTER
Double check with VA again and they said pt. Is falling under VA location area and they can get pt. In within a time limit and that's why pt. Is consider for VA in house PT. Pt. Is aware of this.

## 2025-02-14 NOTE — TELEPHONE ENCOUNTER
Spoke with Veronique in our VA auth department, states contacted VA and they are only approving PT through the VA . She states she has made pt aware of this as well

## 2025-02-20 ENCOUNTER — OFFICE VISIT (OUTPATIENT)
Age: 70
End: 2025-02-20
Payer: OTHER GOVERNMENT

## 2025-02-20 VITALS
DIASTOLIC BLOOD PRESSURE: 80 MMHG | HEIGHT: 70 IN | BODY MASS INDEX: 22.76 KG/M2 | WEIGHT: 159 LBS | SYSTOLIC BLOOD PRESSURE: 110 MMHG | HEART RATE: 56 BPM

## 2025-02-20 DIAGNOSIS — I48.19 PERSISTENT ATRIAL FIBRILLATION (HCC): Primary | ICD-10-CM

## 2025-02-20 PROCEDURE — 99214 OFFICE O/P EST MOD 30 MIN: CPT | Performed by: INTERNAL MEDICINE

## 2025-02-20 PROCEDURE — 1123F ACP DISCUSS/DSCN MKR DOCD: CPT | Performed by: INTERNAL MEDICINE

## 2025-02-20 PROCEDURE — 93000 ELECTROCARDIOGRAM COMPLETE: CPT | Performed by: INTERNAL MEDICINE

## 2025-02-20 NOTE — PROGRESS NOTES
[facility] CARDIOLOGY, [address], [address]  [place] [address]  PHONE: [phone]  1955    SUBJECTIVE:   Aubrey Smith Jr. is a 69 y.o. male seen for a follow up visit regarding the following:     Chief Complaint   Patient presents with    Atrial Fibrillation       HPI:    Aubrey Smith Jr. is a very pleasant 69 y.o. male with a past medical and cardiac history significant for HLD, persistent atrial fibrillation, NICM with EF 30% s/p afib ablation and has been doing great. Feels better, more energy, less fatigue, no chest pain or SOB. EF normalized, no known recurrent atrial fibrillation.    Cardiac PMH: (Old records have been reviewed and summarized below)   TTE (6/4/21): EF 30-35%, severe LAE   MPI (6/14/21): low risk MPI     Reviewed office note Dr. Caban 2/6/25    Past Medical History, Past Surgical History, Family history, Social History, and Medications were all reviewed with the patient today and updated as necessary.     Current Outpatient Medications   Medication Sig Dispense Refill    aspirin (ASPIRIN 81) 81 MG EC tablet Take 1 tablet by mouth in the morning and at bedtime (Patient taking differently: Take 1 tablet by mouth as needed) 70 tablet 0    diclofenac sodium (VOLTAREN) 1 % GEL Apply topically 4 times daily as needed for Pain      celecoxib (CELEBREX) 200 MG capsule Take 1 capsule by mouth 2 times daily (Patient taking differently: Take 100 mg by mouth 2 times daily) 60 capsule 0    metoprolol succinate (TOPROL XL) 50 MG extended release tablet Take 0.5 tablets by mouth daily Takes 25 mg tablet daily      polyethylene glycol (GLYCOLAX) 17 g packet Take 1 packet by mouth daily as needed for Constipation      morphine (MSIR) 15 MG tablet Take 1 tablet by mouth in the morning and at bedtime. BID      CALCIUM PO Take 600 mg by mouth every evening      Whey Protein POWD Take by mouth      magnesium oxide (MAG-OX) 400 MG tablet Take 1 tablet by mouth every evening

## 2025-04-07 ENCOUNTER — TELEPHONE (OUTPATIENT)
Dept: ORTHOPEDIC SURGERY | Age: 70
End: 2025-04-07

## 2025-04-07 NOTE — TELEPHONE ENCOUNTER
Lexie from [facility] is calling to find out if there is a current VA auth for PT.  Her number is [phone].

## 2025-04-25 ENCOUNTER — TELEPHONE (OUTPATIENT)
Dept: ORTHOPEDIC SURGERY | Age: 70
End: 2025-04-25

## 2025-04-25 NOTE — TELEPHONE ENCOUNTER
Received request for records for this pt. With RFS request    [email or web address]@ we can send records at this time and appt. Is in 8/2025 we can sebd RESin the end of June as its too early to send at this time      Thanks.

## 2025-04-28 ENCOUNTER — CLINICAL DOCUMENTATION (OUTPATIENT)
Dept: ORTHOPEDIC SURGERY | Age: 70
End: 2025-04-28

## 2025-08-04 ENCOUNTER — TELEPHONE (OUTPATIENT)
Dept: ORTHOPEDIC SURGERY | Age: 70
End: 2025-08-04

## 2025-08-05 ENCOUNTER — TELEPHONE (OUTPATIENT)
Dept: ORTHOPEDIC SURGERY | Age: 70
End: 2025-08-05

## 2025-08-14 ENCOUNTER — TELEPHONE (OUTPATIENT)
Dept: ORTHOPEDIC SURGERY | Age: 70
End: 2025-08-14

## 2025-09-03 ENCOUNTER — TELEPHONE (OUTPATIENT)
Dept: ORTHOPEDIC SURGERY | Age: 70
End: 2025-09-03

## (undated) DEVICE — HOOD: Brand: T7PLUS

## (undated) DEVICE — SHEET,DRAPE,53X77,STERILE: Brand: MEDLINE

## (undated) DEVICE — INTRODUCER SHTH CARDIAGUIDE FCL20100

## (undated) DEVICE — SUTURE VCRL VIO BR 0 18IN C/R M04 J701D

## (undated) DEVICE — GLOVE ORANGE PI 7   MSG9070

## (undated) DEVICE — SOFT-TIPPED EXTRA-FIRM EXCHANGE CATHETER: Brand: COOK

## (undated) DEVICE — CLOTH PRE OP W9XL10.5IN 2% CHG FRAGRANCE RNS FREE READYPREP

## (undated) DEVICE — SUTURE ETHIBOND EXCEL SZ 2 L30IN NONABSORBABLE GRN L40MM V-37 MX69G

## (undated) DEVICE — TOTAL KNEE: Brand: MEDLINE INDUSTRIES, INC.

## (undated) DEVICE — SOLUTION IRRIG 1000ML 09% SOD CHL USP PIC PLAS CONTAINER

## (undated) DEVICE — GLOVE SURG SZ 8 L12IN FNGR THK79MIL GRN LTX FREE

## (undated) DEVICE — HANDPIECE SET WITH COAXIAL HIGH FLOW TIP AND SUCTION TUBE: Brand: INTERPULSE

## (undated) DEVICE — STERILE PRESSURE PROTECTOR PAD® FOR DE MAYO UNIVERSAL DISTRACTOR® (10/CASE): Brand: DE MAYO UNIVERSAL DISTRACTOR®

## (undated) DEVICE — SUTURE VICRYL SZ 1 L36IN ABSRB UD L36MM CT-1 1/2 CIR J947H

## (undated) DEVICE — AGENT HEMOSTATIC SURGIFLOW MATRIX KIT W/THROMBIN

## (undated) DEVICE — POSTERIOR LAMI VANPLT-LUCAS: Brand: MEDLINE INDUSTRIES, INC.

## (undated) DEVICE — INTENDED FOR TISSUE SEPARATION, AND OTHER PROCEDURES THAT REQUIRE A SHARP SURGICAL BLADE TO PUNCTURE OR CUT.: Brand: BARD-PARKER ® STAINLESS STEEL BLADES

## (undated) DEVICE — C-ARM: Brand: UNBRANDED

## (undated) DEVICE — SURGIFOAM SPNG SZ 100

## (undated) DEVICE — SOLUTION IRRIG 3000ML 0.9% SOD CHL USP UROMATIC PLAS CONT

## (undated) DEVICE — TOTAL TRAY, DB, 100% SILI FOLEY, 16FR 10: Brand: MEDLINE

## (undated) DEVICE — KIT INT FIX FEM TIB CKPT MAKOPLASTY

## (undated) DEVICE — SYRINGE MED 3ML CLR PLAS STD N CTRL LUERLOCK TIP DISP

## (undated) DEVICE — X-LARGE COTTON GLOVE: Brand: DEROYAL

## (undated) DEVICE — GLOVE SURG SZ 75 L12IN FNGR THK79MIL GRN LTX FREE

## (undated) DEVICE — STRIP SKIN CLSR W1XL5IN NYLON REINF CURAD STERIL

## (undated) DEVICE — PINNACLE INTRODUCER SHEATH: Brand: PINNACLE

## (undated) DEVICE — SOLUTION IRRIG 1000ML 0.9% SOD CHL USP POUR PLAS BTL

## (undated) DEVICE — CATH BIDIR JCRV 8F 3.5X115MM -- THERMOCOOL SF

## (undated) DEVICE — JACKSON TABLE POSITIONER KIT: Brand: MEDLINE INDUSTRIES, INC.

## (undated) DEVICE — DRAPE IRRIG FLD WRM W44XL44IN W/ AORN STD PRTBL INTRATEMP

## (undated) DEVICE — 3M™ TEGADERM™ TRANSPARENT FILM DRESSING FRAME STYLE, 1629, 8 IN X 12 IN (20 CM X 30 CM), 10/CT 8CT/CASE: Brand: 3M™ TEGADERM™

## (undated) DEVICE — BLADE SURG SAW STD S STL OSC W/ SERR EDGE DISP

## (undated) DEVICE — Device

## (undated) DEVICE — SPONGE,NEURO,1"X3",XR,STRL,LF,10/PK: Brand: MEDLINE

## (undated) DEVICE — SOLUTION WND IRRIGATION 450 ML 0.5 PVP-I 0.9 NACL

## (undated) DEVICE — TUBE SET IRR PUMP THERMALCOOL -- SMARTABLATE

## (undated) DEVICE — 450 ML BOTTLE OF 0.05% CHLORHEXIDINE GLUCONATE IN 99.95% STERILE WATER FOR IRRIGATION, USP AND APPLICATOR.: Brand: IRRISEPT ANTIMICROBIAL WOUND LAVAGE

## (undated) DEVICE — GARMENT,MEDLINE,DVT,INT,CALF,LG, GEN2: Brand: MEDLINE

## (undated) DEVICE — GARMENT,MEDLINE,DVT,INT,CALF,MED, GEN2: Brand: MEDLINE

## (undated) DEVICE — BIPOLAR SEALER 23-112-1 AQM 6.0: Brand: AQUAMANTYS ®

## (undated) DEVICE — SUTURE VCRL SZ 2-0 L18IN ABSRB UD L26MM CP-2 1/2 CIR REV J762D

## (undated) DEVICE — DRESSING HYDROFIBER AQUACEL AG ADVANTAGE 3.5X12 IN

## (undated) DEVICE — WAX SURG 2.5GM HEMSTAT BNE BEESWAX PARAFFIN ISO PALMITATE

## (undated) DEVICE — CATHETER US GE COMPATIBILITY SOUNDSTAR ECO 10FR

## (undated) DEVICE — GOWN,REINFORCED,POLY,AURORA,XXLARGE,STR: Brand: MEDLINE

## (undated) DEVICE — BLADE RMR L51MM PAT PILOT H

## (undated) DEVICE — SUTURE MONOCRYL STRATAFIX SPRL + SZ 2-0 L18IN ABSRB UD CT-1 SXMP1B413

## (undated) DEVICE — GLOVE SURG SZ 7 L11.33IN FNGR THK9.8MIL STRW LTX POLYMER

## (undated) DEVICE — SYRINGE MED 10ML LUERLOCK TIP W/O SFTY DISP

## (undated) DEVICE — MASTISOL ADHESIVE LIQ 2/3ML

## (undated) DEVICE — GLOVE SURG SZ 65 THK91MIL LTX FREE SYN POLYISOPRENE

## (undated) DEVICE — KIT DRP FOR RIO ROBOTIC ARM ASST SYS

## (undated) DEVICE — BLADE ES ELASTOMERIC COAT INSUL DURABLE BEND UPTO 90DEG

## (undated) DEVICE — KIT TRK KNEE PROC VIZADISC

## (undated) DEVICE — TUBING, SUCTION, 1/4" X 10', STRAIGHT: Brand: MEDLINE

## (undated) DEVICE — SUTURE MONOCRYL 2/0 27IN UNDYED SH V20

## (undated) DEVICE — APPLICATOR MEDICATED 26 CC SOLUTION HI LT ORNG CHLORAPREP

## (undated) DEVICE — STERILE-Z SURGICAL PATIENT COVERS CLEAR POLYETHYLENE STERILE UNIVERSAL FIT 10 PER CASE: Brand: STERILE-Z

## (undated) DEVICE — SYSTEM CLOSURE 6-12 FR VEN VASC VASCADE MVP

## (undated) DEVICE — PATCH CARTO 3 EXT REF --

## (undated) DEVICE — PIN BNE FIX TEMP L110MM DIA4MM MAKO

## (undated) DEVICE — Device: Brand: NRG TRANSSEPTAL NEEDLE

## (undated) DEVICE — GLOVE ORANGE PI 8   MSG9080

## (undated) DEVICE — CATHETER EP 7FR L115CM 2-8-2MM SPC TIP 2MM 10 ELECTRD FJ

## (undated) DEVICE — DISPOSABLE STANDARD BIPOLAR FORCEPS, NON-STICK,: Brand: SPETZLER-MALIS

## (undated) DEVICE — MAYFIELD® DISPOSABLE ADULT SKULL PIN (PLASTIC BASE): Brand: MAYFIELD®

## (undated) DEVICE — 18G NG KIT WITH 96IN PROBE COVER (10 PK): Brand: SITE-RITE

## (undated) DEVICE — SUTURE ABS ANTIBACT 1-0 CTX 24IN STRATAFIX PDS+ SXPP1A445

## (undated) DEVICE — PINNACLE TIF INTRODUCER SHEATH: Brand: PINNACLE

## (undated) DEVICE — 5.0MM PRECISION ROUND

## (undated) DEVICE — SPONGE,NEURO,0.5"X1",XR,STRL,LF,10/PK: Brand: MEDLINE

## (undated) DEVICE — PIN BNE FIX TEMP L140MM DIA4MM MAKO

## (undated) DEVICE — CATH EP MAP 2-6-2 7FR D CRV -- PENTARAY

## (undated) DEVICE — SPINE NEURO: Brand: MEDLINE INDUSTRIES, INC.

## (undated) DEVICE — CARBIDE MATCH HEAD

## (undated) DEVICE — SUTURE VICRYL + SZ 1 L36IN ABSRB UD L36MM CT-1 1/2 CIR VCP947H

## (undated) DEVICE — PRESSURE MONITORING SET: Brand: TRUWAVE, VAMP PLUS

## (undated) DEVICE — SOLUTION IRRIG 1000ML STRL H2O USP PLAS POUR BTL

## (undated) DEVICE — CATH DECANAV EP F CURVE 7FR --

## (undated) DEVICE — PAD,NON-ADHERENT,3X8,STERILE,LF,1/PK: Brand: MEDLINE

## (undated) DEVICE — BIT DRL 3.5 MM TAPR DISP

## (undated) DEVICE — 3M™ TEGADERM™ TRANSPARENT FILM DRESSING FRAME STYLE, 1626W, 4 IN X 4-3/4 IN (10 CM X 12 CM), 50/CT 4CT/CASE: Brand: 3M™ TEGADERM™

## (undated) DEVICE — BIT DRLL SPNL 2.3MM

## (undated) DEVICE — SOLUTION IV 250ML 0.9% SOD CHL PH 5 INJ USP VIAFLX PLAS

## (undated) DEVICE — KIT EVAC 0.13IN RECT TB DIA10FR 400CC PVC 3 SPR Y CONN DRN

## (undated) DEVICE — SPONGE,NEURO,0.5"X0.5",XR,STRL,10/PK: Brand: MEDLINE